# Patient Record
Sex: FEMALE | Race: BLACK OR AFRICAN AMERICAN | NOT HISPANIC OR LATINO | Employment: OTHER | URBAN - METROPOLITAN AREA
[De-identification: names, ages, dates, MRNs, and addresses within clinical notes are randomized per-mention and may not be internally consistent; named-entity substitution may affect disease eponyms.]

---

## 2017-01-13 ENCOUNTER — OFFICE VISIT (OUTPATIENT)
Dept: INTERNAL MEDICINE | Facility: CLINIC | Age: 72
End: 2017-01-13
Payer: COMMERCIAL

## 2017-01-13 VITALS
BODY MASS INDEX: 30.66 KG/M2 | HEART RATE: 69 BPM | SYSTOLIC BLOOD PRESSURE: 132 MMHG | TEMPERATURE: 98 F | OXYGEN SATURATION: 96 % | WEIGHT: 184 LBS | HEIGHT: 65 IN | DIASTOLIC BLOOD PRESSURE: 70 MMHG | RESPIRATION RATE: 16 BRPM

## 2017-01-13 DIAGNOSIS — M85.80 OSTEOPENIA: ICD-10-CM

## 2017-01-13 DIAGNOSIS — I10 ESSENTIAL HYPERTENSION: ICD-10-CM

## 2017-01-13 DIAGNOSIS — Z00.00 ANNUAL PHYSICAL EXAM: ICD-10-CM

## 2017-01-13 DIAGNOSIS — E21.0 HYPERPARATHYROIDISM, PRIMARY: Primary | ICD-10-CM

## 2017-01-13 PROCEDURE — 99999 PR PBB SHADOW E&M-EST. PATIENT-LVL III: CPT | Mod: PBBFAC,,, | Performed by: NURSE PRACTITIONER

## 2017-01-13 PROCEDURE — 99397 PER PM REEVAL EST PAT 65+ YR: CPT | Mod: S$GLB,,, | Performed by: NURSE PRACTITIONER

## 2017-01-13 PROCEDURE — 3078F DIAST BP <80 MM HG: CPT | Mod: S$GLB,,, | Performed by: NURSE PRACTITIONER

## 2017-01-13 PROCEDURE — 3075F SYST BP GE 130 - 139MM HG: CPT | Mod: S$GLB,,, | Performed by: NURSE PRACTITIONER

## 2017-01-13 RX ORDER — RESVER/WINE/BFL/GRPSD/PC/C/POM 200MG-60MG
5000 CAPSULE ORAL DAILY
COMMUNITY
End: 2021-03-03

## 2017-01-13 NOTE — PATIENT INSTRUCTIONS
Follow up in July, sooner if needed  Go to ER for new worse or concerning symptoms  Take Citracal     Understanding Primary Hyperparathyroidism  The parathyroid glands are 4 tiny glands in the neck. They make parathyroid hormone (PTH). PTH controls the amount of calcium and phosphorus in your blood. Primary hyperparathyroidism is when there is too much PTH in your blood. It occurs when one or more of the glands are too active.     Normal parathyroid glands         Enlarged parathyroid gland      What causes primary hyperparathyroidism?  Primary hyperparathyroidism can occur when a parathyroid gland becomes enlarged. Hyperparathyroidism can also occur as a complication of other medical conditions, such as kidney failure, or rickets, but in these conditions, calcium is usually not high. This is called secondary hyperthyroidism.  Why is it a problem?  With primary hyperparathyroidism, your glands make too much PTH. The job of PTH is to tell the body how to control calcium. Too much PTH means the body increases the amount of calcium in the blood. This leads to a problem called hypercalcemia. This is when the amount of calcium in the blood becomes too high. Hypercalcemia can cause serious health problems.  High calcium can be caused by other conditions, like taking too many calcium and vitamin D supplements, certain genetic conditions, or other circumstances. Your doctor will find out if primary hyperparathyroidism is the cause of your high calcium levels. If so, treatment options will be discussed with you.  What are the risk factors for primary hyperparathyroidism?  The risk factors for developing this condition include:  · Being a woman (its less common in men)  · Being older (its more likely to occur with age)  · Having parents or siblings with the condition or other endocrine tumors  · Having certain kidney problems  · Taking certain medications  · Having had radiation treatment in the head or neck  What are the  symptoms of primary hyperparathyroidism?  Symptoms of the condition can include:  · Muscle weakness  · Depression  · Tiredness  · Confusion and memory loss  · Poor memory  · Nausea and vomiting  · Pain in the stomach area (abdomen)  · Hard stools (constipation)  · Stomach ulcers  · Need to urinate often  · Kidney stones  · Joint or bone pain  · Bone disease (osteopenia or osteoporosis), an increase in bone fractures  · High blood pressure  · Increased thirst  How is primary hyperparathyroidism treated?  If primary hyperparathyroidism is not treated, it can get worse over time. Surgery may be done to remove any enlarged parathyroid glands. This lets the amount of calcium in the blood go back to normal. Your doctors may want to ensure that you have enough vitamin D and calcium nutrition before the surgery. This will reduce the risk of low calcium after the surgery. You and your doctor can discuss your treatment options. Be sure to ask any questions you have.  © 5756-2688 [employer]. [address] [place] [address]. All rights reserved. This information is not intended as a substitute for professional medical care. Always follow your healthcare professional's instructions.        Understanding Primary Hyperparathyroidism  The parathyroid glands are 4 tiny glands in the neck. They make parathyroid hormone (PTH). PTH controls the amount of calcium and phosphorus in your blood. Primary hyperparathyroidism is when there is too much PTH in your blood. It occurs when one or more of the glands are too active.     Normal parathyroid glands         Enlarged parathyroid gland      What causes primary hyperparathyroidism?  Primary hyperparathyroidism can occur when a parathyroid gland becomes enlarged. Hyperparathyroidism can also occur as a complication of other medical conditions, such as kidney failure, or rickets, but in these conditions, calcium is usually not high. This is called secondary  hyperthyroidism.  Why is it a problem?  With primary hyperparathyroidism, your glands make too much PTH. The job of PTH is to tell the body how to control calcium. Too much PTH means the body increases the amount of calcium in the blood. This leads to a problem called hypercalcemia. This is when the amount of calcium in the blood becomes too high. Hypercalcemia can cause serious health problems.  High calcium can be caused by other conditions, like taking too many calcium and vitamin D supplements, certain genetic conditions, or other circumstances. Your doctor will find out if primary hyperparathyroidism is the cause of your high calcium levels. If so, treatment options will be discussed with you.  What are the risk factors for primary hyperparathyroidism?  The risk factors for developing this condition include:  · Being a woman (its less common in men)  · Being older (its more likely to occur with age)  · Having parents or siblings with the condition or other endocrine tumors  · Having certain kidney problems  · Taking certain medications  · Having had radiation treatment in the head or neck  What are the symptoms of primary hyperparathyroidism?  Symptoms of the condition can include:  · Muscle weakness  · Depression  · Tiredness  · Confusion and memory loss  · Poor memory  · Nausea and vomiting  · Pain in the stomach area (abdomen)  · Hard stools (constipation)  · Stomach ulcers  · Need to urinate often  · Kidney stones  · Joint or bone pain  · Bone disease (osteopenia or osteoporosis), an increase in bone fractures  · High blood pressure  · Increased thirst  How is primary hyperparathyroidism treated?  If primary hyperparathyroidism is not treated, it can get worse over time. Surgery may be done to remove any enlarged parathyroid glands. This lets the amount of calcium in the blood go back to normal. Your doctors may want to ensure that you have enough vitamin D and calcium nutrition before the surgery. This  will reduce the risk of low calcium after the surgery. You and your doctor can discuss your treatment options. Be sure to ask any questions you have.  © 3695-9379 [employer]. [address] [place] [address], [place] [address]. All rights reserved. This information is not intended as a substitute for professional medical care. Always follow your healthcare professional's instructions.

## 2017-01-13 NOTE — MR AVS SNAPSHOT
[address] [address]  [place] [address]  Phone: [phone]                  Marsha Chun   2017 2:00 PM   Office Visit    Description:  Female : 1945   Provider:  Anali Quigley, NP-C   Department:  [place] [facility]           Reason for Visit     Annual Exam                To Do List           Goals (5 Years of Data)     None      [facility]     [facility] On Call Nurse Care Line -  Assistance  Registered nurses in the [facility] Center provide clinical advisement, health education, appointment booking, and other advisory services.  Call for this free service at [phone].             Medications           Message regarding Medications     Verify the changes and/or additions to your medication regime listed below are the same as discussed with your clinician today.  If any of these changes or additions are incorrect, please notify your healthcare provider.        STOP taking these medications     atenolol (TENORMIN) 50 MG tablet Take 1 tablet (50 mg total) by mouth once daily.    chlorthalidone (HYGROTEN) 25 MG Tab Take 1 tablet (25 mg total) by mouth once daily.           Verify that the below list of medications is an accurate representation of the medications you are currently taking.  If none reported, the list may be blank. If incorrect, please contact your healthcare provider. Carry this list with you in case of emergency.           Current Medications     atenolol-chlorthalidone (TENORETIC) 50-25 mg Tab TAKE ONE TABLET BY MOUTH ONCE DAILY    cholecalciferol, vitamin D3, (VITAMIN D3) 5,000 unit Tab Take 5,000 Units by mouth once daily.    fish oil-omega-3 fatty acids 300-1,000 mg capsule Take 2 g by mouth once daily.           Clinical Reference Information           Vital Signs - Last Recorded  Most recent update: 2017  2:03 PM by Rosio Maldonado MA    BP Pulse Temp Resp Ht Wt    132/70 (BP Location: Left arm,  "Patient Position: Sitting, BP Method: Manual) 69 97.5 °F (36.4 °C) (Oral) 16 5' 5" (1.651 m) 83.5 kg (184 lb)    SpO2 BMI             96% 30.62 kg/m2         Blood Pressure          Most Recent Value    BP  132/70      Allergies as of 1/13/2017     No Known Allergies      Immunizations Administered on Date of Encounter - 1/13/2017     None      [facility] Sign-Up     Activating your [facility] account is as easy as 1-2-3!     1) Visit [email or web address], select Sign Up Now, enter this activation code and your date of birth, then select Next.  [email or web address]  Expires: 2/27/2017  2:22 PM      2) Create a username and password to use when you visit [facility] in the future and select a security question in case you lose your password and select Next.    3) Enter your e-mail address and click Sign Up!    Additional Information  If you have questions, please e-mail [email or web address] or call [phone] to talk to our [facility] staff. Remember, [facility] is NOT to be used for urgent needs. For medical emergencies, dial 911.         Instructions    Follow up in July, sooner if needed  Go to ER for new worse or concerning symptoms  Take Citracal     Understanding Primary Hyperparathyroidism  The parathyroid glands are 4 tiny glands in the neck. They make parathyroid hormone (PTH). PTH controls the amount of calcium and phosphorus in your blood. Primary hyperparathyroidism is when there is too much PTH in your blood. It occurs when one or more of the glands are too active.     Normal parathyroid glands         Enlarged parathyroid gland      What causes primary hyperparathyroidism?  Primary hyperparathyroidism can occur when a parathyroid gland becomes enlarged. Hyperparathyroidism can also occur as a complication of other medical conditions, such as kidney failure, or rickets, but in these conditions, calcium is usually not high. This is called secondary hyperthyroidism.  Why is it a problem?  With primary " hyperparathyroidism, your glands make too much PTH. The job of PTH is to tell the body how to control calcium. Too much PTH means the body increases the amount of calcium in the blood. This leads to a problem called hypercalcemia. This is when the amount of calcium in the blood becomes too high. Hypercalcemia can cause serious health problems.  High calcium can be caused by other conditions, like taking too many calcium and vitamin D supplements, certain genetic conditions, or other circumstances. Your doctor will find out if primary hyperparathyroidism is the cause of your high calcium levels. If so, treatment options will be discussed with you.  What are the risk factors for primary hyperparathyroidism?  The risk factors for developing this condition include:  · Being a woman (its less common in men)  · Being older (its more likely to occur with age)  · Having parents or siblings with the condition or other endocrine tumors  · Having certain kidney problems  · Taking certain medications  · Having had radiation treatment in the head or neck  What are the symptoms of primary hyperparathyroidism?  Symptoms of the condition can include:  · Muscle weakness  · Depression  · Tiredness  · Confusion and memory loss  · Poor memory  · Nausea and vomiting  · Pain in the stomach area (abdomen)  · Hard stools (constipation)  · Stomach ulcers  · Need to urinate often  · Kidney stones  · Joint or bone pain  · Bone disease (osteopenia or osteoporosis), an increase in bone fractures  · High blood pressure  · Increased thirst  How is primary hyperparathyroidism treated?  If primary hyperparathyroidism is not treated, it can get worse over time. Surgery may be done to remove any enlarged parathyroid glands. This lets the amount of calcium in the blood go back to normal. Your doctors may want to ensure that you have enough vitamin D and calcium nutrition before the surgery. This will reduce the risk of low calcium after the surgery.  You and your doctor can discuss your treatment options. Be sure to ask any questions you have.  © 5882-9595 [employer]. [address] [place] [address], [place] [address]. All rights reserved. This information is not intended as a substitute for professional medical care. Always follow your healthcare professional's instructions.        Understanding Primary Hyperparathyroidism  The parathyroid glands are 4 tiny glands in the neck. They make parathyroid hormone (PTH). PTH controls the amount of calcium and phosphorus in your blood. Primary hyperparathyroidism is when there is too much PTH in your blood. It occurs when one or more of the glands are too active.     Normal parathyroid glands         Enlarged parathyroid gland      What causes primary hyperparathyroidism?  Primary hyperparathyroidism can occur when a parathyroid gland becomes enlarged. Hyperparathyroidism can also occur as a complication of other medical conditions, such as kidney failure, or rickets, but in these conditions, calcium is usually not high. This is called secondary hyperthyroidism.  Why is it a problem?  With primary hyperparathyroidism, your glands make too much PTH. The job of PTH is to tell the body how to control calcium. Too much PTH means the body increases the amount of calcium in the blood. This leads to a problem called hypercalcemia. This is when the amount of calcium in the blood becomes too high. Hypercalcemia can cause serious health problems.  High calcium can be caused by other conditions, like taking too many calcium and vitamin D supplements, certain genetic conditions, or other circumstances. Your doctor will find out if primary hyperparathyroidism is the cause of your high calcium levels. If so, treatment options will be discussed with you.  What are the risk factors for primary hyperparathyroidism?  The risk factors for developing this condition include:  · Being a woman (its less common in men)  · Being older  (its more likely to occur with age)  · Having parents or siblings with the condition or other endocrine tumors  · Having certain kidney problems  · Taking certain medications  · Having had radiation treatment in the head or neck  What are the symptoms of primary hyperparathyroidism?  Symptoms of the condition can include:  · Muscle weakness  · Depression  · Tiredness  · Confusion and memory loss  · Poor memory  · Nausea and vomiting  · Pain in the stomach area (abdomen)  · Hard stools (constipation)  · Stomach ulcers  · Need to urinate often  · Kidney stones  · Joint or bone pain  · Bone disease (osteopenia or osteoporosis), an increase in bone fractures  · High blood pressure  · Increased thirst  How is primary hyperparathyroidism treated?  If primary hyperparathyroidism is not treated, it can get worse over time. Surgery may be done to remove any enlarged parathyroid glands. This lets the amount of calcium in the blood go back to normal. Your doctors may want to ensure that you have enough vitamin D and calcium nutrition before the surgery. This will reduce the risk of low calcium after the surgery. You and your doctor can discuss your treatment options. Be sure to ask any questions you have.  © 7652-2046 [employer]. [address] [place] [address], [place] [address]. All rights reserved. This information is not intended as a substitute for professional medical care. Always follow your healthcare professional's instructions.

## 2017-01-13 NOTE — PROGRESS NOTES
Subjective:       Patient ID: Marsha Chun is a 72 y.o. female.    Chief Complaint: Annual Exam    HPI Ms Chun is here for her annual exam.   She feels well today.  She eats well and is active.  Her health maintenance is up to date.    Review of Systems   Constitutional: Negative for fever.   Respiratory: Negative.    Cardiovascular: Negative.        Objective:      Physical Exam   Constitutional: She is oriented to person, place, and time. She appears well-developed and well-nourished. She does not appear ill. No distress.   Cardiovascular: Normal rate, regular rhythm and normal heart sounds.  Exam reveals no friction rub.    No murmur heard.  Pulmonary/Chest: Effort normal and breath sounds normal. No respiratory distress. She has no decreased breath sounds. She has no wheezes. She has no rhonchi. She has no rales.   Musculoskeletal: Normal range of motion. She exhibits no edema.   Neurological: She is alert and oriented to person, place, and time.   Skin: Skin is warm and dry. No erythema.   Psychiatric: She has a normal mood and affect. Her behavior is normal.   Vitals reviewed.      Assessment:       1. Hyperparathyroidism, primary    2. Annual physical exam    3. Essential hypertension    4. Osteopenia        Plan:       Hyperparathyroidism, primary  Calcium WNL, due in July, bone density WNL, repeat in 2 years    Annual physical exam  F/u in July with labs    Essential hypertension  Controlled, continue current treatment    Osteopenia  Continue Citracal    F/u in July, sooner if necessary  Verbalized understanding

## 2017-03-09 DIAGNOSIS — I10 ESSENTIAL HYPERTENSION: ICD-10-CM

## 2017-03-10 RX ORDER — ATENOLOL AND CHLORTHALIDONE TABLET 50; 25 MG/1; MG/1
TABLET ORAL
Qty: 90 TABLET | Refills: 0 | Status: SHIPPED | OUTPATIENT
Start: 2017-03-10 | End: 2017-05-24 | Stop reason: SDUPTHER

## 2017-04-17 PROBLEM — Z00.00 ANNUAL PHYSICAL EXAM: Status: RESOLVED | Noted: 2017-01-13 | Resolved: 2017-04-17

## 2017-05-24 DIAGNOSIS — I10 ESSENTIAL HYPERTENSION: ICD-10-CM

## 2017-05-24 RX ORDER — ATENOLOL AND CHLORTHALIDONE TABLET 50; 25 MG/1; MG/1
1 TABLET ORAL DAILY
Qty: 90 TABLET | Refills: 1 | Status: SHIPPED | OUTPATIENT
Start: 2017-05-24 | End: 2017-08-03 | Stop reason: SDUPTHER

## 2017-05-24 NOTE — TELEPHONE ENCOUNTER
----- Message from Rima Vela sent at 5/24/2017 11:32 AM CDT -----  Contact: Self   Patient says she need a refill. Please call patient at [phone]     atenolol-chlorthalidone (TENORETIC) 50-25 mg Tab    [facility]

## 2017-08-03 ENCOUNTER — HOSPITAL ENCOUNTER (OUTPATIENT)
Dept: RADIOLOGY | Facility: HOSPITAL | Age: 72
Discharge: HOME | End: 2017-08-03
Attending: INTERNAL MEDICINE
Payer: COMMERCIAL

## 2017-08-03 ENCOUNTER — TELEPHONE (OUTPATIENT)
Dept: FAMILY MEDICINE | Facility: CLINIC | Age: 72
End: 2017-08-03

## 2017-08-03 ENCOUNTER — OFFICE VISIT (OUTPATIENT)
Dept: FAMILY MEDICINE | Facility: CLINIC | Age: 72
End: 2017-08-03
Payer: COMMERCIAL

## 2017-08-03 VITALS
SYSTOLIC BLOOD PRESSURE: 146 MMHG | BODY MASS INDEX: 30.42 KG/M2 | OXYGEN SATURATION: 96 % | DIASTOLIC BLOOD PRESSURE: 82 MMHG | RESPIRATION RATE: 17 BRPM | TEMPERATURE: 98 F | HEIGHT: 65 IN | WEIGHT: 182.56 LBS | HEART RATE: 78 BPM

## 2017-08-03 VITALS — BODY MASS INDEX: 30.32 KG/M2 | WEIGHT: 182 LBS | HEIGHT: 65 IN

## 2017-08-03 DIAGNOSIS — M25.551 RIGHT HIP PAIN: Primary | ICD-10-CM

## 2017-08-03 DIAGNOSIS — Z12.31 OTHER SCREENING MAMMOGRAM: Primary | ICD-10-CM

## 2017-08-03 DIAGNOSIS — Z12.31 OTHER SCREENING MAMMOGRAM: ICD-10-CM

## 2017-08-03 DIAGNOSIS — I10 ESSENTIAL HYPERTENSION: ICD-10-CM

## 2017-08-03 PROCEDURE — 99214 OFFICE O/P EST MOD 30 MIN: CPT | Mod: 25,S$GLB,, | Performed by: NURSE PRACTITIONER

## 2017-08-03 PROCEDURE — 77067 SCR MAMMO BI INCL CAD: CPT | Mod: 26,,, | Performed by: RADIOLOGY

## 2017-08-03 PROCEDURE — 3008F BODY MASS INDEX DOCD: CPT | Mod: S$GLB,,, | Performed by: NURSE PRACTITIONER

## 2017-08-03 PROCEDURE — 77067 SCR MAMMO BI INCL CAD: CPT | Mod: TC

## 2017-08-03 PROCEDURE — 96372 THER/PROPH/DIAG INJ SC/IM: CPT | Mod: S$GLB,,, | Performed by: NURSE PRACTITIONER

## 2017-08-03 PROCEDURE — 99999 PR PBB SHADOW E&M-EST. PATIENT-LVL III: CPT | Mod: PBBFAC,,, | Performed by: NURSE PRACTITIONER

## 2017-08-03 PROCEDURE — 1159F MED LIST DOCD IN RCRD: CPT | Mod: S$GLB,,, | Performed by: NURSE PRACTITIONER

## 2017-08-03 PROCEDURE — 1125F AMNT PAIN NOTED PAIN PRSNT: CPT | Mod: S$GLB,,, | Performed by: NURSE PRACTITIONER

## 2017-08-03 RX ORDER — ATENOLOL AND CHLORTHALIDONE TABLET 50; 25 MG/1; MG/1
1 TABLET ORAL DAILY
Qty: 90 TABLET | Refills: 1 | Status: SHIPPED | OUTPATIENT
Start: 2017-08-03 | End: 2017-08-03 | Stop reason: SDUPTHER

## 2017-08-03 RX ORDER — KETOROLAC TROMETHAMINE 30 MG/ML
30 INJECTION, SOLUTION INTRAMUSCULAR; INTRAVENOUS
Status: COMPLETED | OUTPATIENT
Start: 2017-08-03 | End: 2017-08-03

## 2017-08-03 RX ORDER — DEXAMETHASONE SODIUM PHOSPHATE 4 MG/ML
8 INJECTION, SOLUTION INTRA-ARTICULAR; INTRALESIONAL; INTRAMUSCULAR; INTRAVENOUS; SOFT TISSUE
Status: COMPLETED | OUTPATIENT
Start: 2017-08-03 | End: 2017-08-03

## 2017-08-03 RX ORDER — ATENOLOL AND CHLORTHALIDONE TABLET 50; 25 MG/1; MG/1
1 TABLET ORAL DAILY
Qty: 90 TABLET | Refills: 1 | Status: SHIPPED | OUTPATIENT
Start: 2017-08-03 | End: 2017-10-05 | Stop reason: SDUPTHER

## 2017-08-03 RX ORDER — METHOCARBAMOL 750 MG/1
750 TABLET, FILM COATED ORAL 4 TIMES DAILY PRN
Qty: 45 TABLET | Refills: 0 | Status: SHIPPED | OUTPATIENT
Start: 2017-08-03 | End: 2017-09-02

## 2017-08-03 RX ADMIN — KETOROLAC TROMETHAMINE 30 MG: 30 INJECTION, SOLUTION INTRAMUSCULAR; INTRAVENOUS at 12:08

## 2017-08-03 RX ADMIN — DEXAMETHASONE SODIUM PHOSPHATE 8 MG: 4 INJECTION, SOLUTION INTRA-ARTICULAR; INTRALESIONAL; INTRAMUSCULAR; INTRAVENOUS; SOFT TISSUE at 12:08

## 2017-08-03 NOTE — PATIENT INSTRUCTIONS
Follow up if not improved  Go to ER for new worse or concerning symptoms    Muscle Strain in the Extremities  A muscle strain is a stretching and tearing of muscle fibers. This causes pain, especially when you move that muscle. There may also be some swelling and bruising.  Home care  · Keep the hurt area raised to reduce pain and swelling. This is especially important during the first 48 hours.  · Apply an ice pack over the injured area for 15 to 20 minutes every 3 to 6 hours. You should do this for the first 24 to 48 hours. You can make an ice pack by filling a plastic bag that seals at the top with ice cubes and then wrapping it with a thin towel. Be careful not to injure your skin with the ice treatments. Ice should never be applied directly to skin. Continue the use of ice packs for relief of pain and swelling as needed. After 48 hours, apply heat (warm shower or warm bath) for 15 to 20 minutes several times a day, or alternate ice and heat.  · You may use over-the-counter pain medicine to control pain, unless another medicine was prescribed. If you have chronic liver or kidney disease or ever had a stomach ulcer or GI bleeding, talk with your healthcare provider before using these medicines.  · For leg strains: If crutches have been recommended, dont put full weight on the hurt leg until you can do so without pain. You can return to sports when you are able to hop and run on the injured leg without pain.  Follow-up care  Follow up with your healthcare provider, or as advised.  When to seek medical advice  Call your healthcare provider right away if any of these occur:  · The toes of the injured leg become swollen, cold, blue, numb, or tingly  · Pain or swelling increases  Date Last Reviewed: 11/19/2015  © 7322-4289 [employer]. [address] [place] [address], [place] [address]. All rights reserved. This information is not intended as a substitute for professional medical care. Always follow your  healthcare professional's instructions.

## 2017-08-03 NOTE — PROGRESS NOTES
Dexamethasone 8mg and ketorolac 30mg.....Given to the pt as order by the MD. The patient tolerated well, I advised the patient to wait 15 minuets to observe for any vaccine reactions. The pt. Expressed an understanding.

## 2017-08-03 NOTE — TELEPHONE ENCOUNTER
----- Message from Imelda Leon sent at 8/2/2017 12:07 PM CDT -----  Contact: self  Pt calling to get order for a mammogram. Please call [phone].

## 2017-08-03 NOTE — PROGRESS NOTES
Subjective:       Patient ID: Marsha Chun is a 72 y.o. female.    Chief Complaint: Hip Pain (right )    HPI Ms Chun is here for R hip/buttock pain for the last 3 weeks, denies any inciting incident, it feels like a soreness, she's taken aleve with some relief.  No bowel or bladder dysfunction, no saddle paresthesias, it does not radiate down her leg.  Review of Systems   Constitutional: Negative for fever.   Respiratory: Negative.    Cardiovascular: Negative.    Musculoskeletal: Positive for myalgias.       Objective:      Physical Exam   Constitutional: She is oriented to person, place, and time. She appears well-developed and well-nourished. She does not appear ill. No distress.   Cardiovascular: Normal rate.    No murmur heard.  Pulses:       Dorsalis pedis pulses are 2+ on the right side.        Posterior tibial pulses are 2+ on the right side.   Pulmonary/Chest: Effort normal.   Musculoskeletal: Normal range of motion. She exhibits no edema.        Right hip: She exhibits normal range of motion, normal strength and no tenderness.        Lumbar back: She exhibits normal range of motion, no tenderness and no bony tenderness.   Neurological: She is alert and oriented to person, place, and time.   Skin: Skin is warm and dry. No erythema.   Psychiatric: She has a normal mood and affect. Her behavior is normal.   Vitals reviewed.      Assessment:       1. Right hip pain    2. Essential hypertension        Plan:       Right hip pain  -     ketorolac injection 30 mg; Inject 30 mg into the muscle one time.  -     dexamethasone injection 8 mg; Inject 2 mLs (8 mg total) into the muscle one time.  -     methocarbamol (ROBAXIN) 750 MG Tab; Take 1 tablet (750 mg total) by mouth 4 (four) times daily as needed.  Dispense: 45 tablet; Refill: 0    Essential hypertension  -     atenolol-chlorthalidone (TENORETIC) 50-25 mg Tab; Take 1 tablet by mouth once daily.  Dispense: 90 tablet; Refill: 1  -      atenolol-chlorthalidone (TENORETIC) 50-25 mg Tab; Take 1 tablet by mouth once daily.  Dispense: 90 tablet; Refill: 1    Most likely msk, will give injection, add robaxin to aleve, f/u if not improved    Verbalized understanding

## 2017-10-05 DIAGNOSIS — I10 ESSENTIAL HYPERTENSION: ICD-10-CM

## 2017-10-06 RX ORDER — ATENOLOL AND CHLORTHALIDONE TABLET 50; 25 MG/1; MG/1
1 TABLET ORAL DAILY
Qty: 90 TABLET | Refills: 1 | Status: SHIPPED | OUTPATIENT
Start: 2017-10-06 | End: 2018-04-19 | Stop reason: SDUPTHER

## 2017-11-16 ENCOUNTER — OFFICE VISIT (OUTPATIENT)
Dept: FAMILY MEDICINE | Facility: CLINIC | Age: 72
End: 2017-11-16
Payer: COMMERCIAL

## 2017-11-16 VITALS
RESPIRATION RATE: 17 BRPM | BODY MASS INDEX: 30.37 KG/M2 | OXYGEN SATURATION: 97 % | WEIGHT: 182.31 LBS | TEMPERATURE: 98 F | HEART RATE: 64 BPM | DIASTOLIC BLOOD PRESSURE: 80 MMHG | HEIGHT: 65 IN | SYSTOLIC BLOOD PRESSURE: 130 MMHG

## 2017-11-16 DIAGNOSIS — Z02.4 ENCOUNTER FOR CDL (COMMERCIAL DRIVING LICENSE) EXAM: Primary | ICD-10-CM

## 2017-11-16 LAB
BILIRUB SERPL-MCNC: NEGATIVE MG/DL
BLOOD URINE, POC: NEGATIVE
COLOR, POC UA: YELLOW
GLUCOSE UR QL STRIP: NORMAL
KETONES UR QL STRIP: NEGATIVE
LEUKOCYTE ESTERASE URINE, POC: 1
NITRITE, POC UA: NEGATIVE
PH, POC UA: 7
PROTEIN, POC: NEGATIVE
SPECIFIC GRAVITY, POC UA: 1
UROBILINOGEN, POC UA: NORMAL

## 2017-11-16 PROCEDURE — 81001 URINALYSIS AUTO W/SCOPE: CPT | Mod: S$GLB,,, | Performed by: NURSE PRACTITIONER

## 2017-11-16 PROCEDURE — 99999 PR PBB SHADOW E&M-EST. PATIENT-LVL IV: CPT | Mod: PBBFAC,,, | Performed by: NURSE PRACTITIONER

## 2017-11-16 PROCEDURE — 99214 OFFICE O/P EST MOD 30 MIN: CPT | Mod: 25,S$GLB,, | Performed by: NURSE PRACTITIONER

## 2017-11-16 NOTE — PROGRESS NOTES
Subjective:       Patient ID: Marsha Chun is a 72 y.o. female.    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 She feels well today.  She denies any h/o seizures, DM or LOC.  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  Review of Systems   Constitutional: Negative for fever.   Respiratory: Negative.    Cardiovascular: Negative.        Objective:      Physical Exam   Constitutional: She is oriented to person, place, and time. She appears well-developed and well-nourished. She does not appear ill. No distress.   HENT:   Head: Normocephalic and atraumatic.   Right Ear: Tympanic membrane normal.   Left Ear: Tympanic membrane normal.   Mouth/Throat: Uvula is midline, oropharynx is clear and moist and mucous membranes are normal.   Forced whisper WNL at 5 feet.   Eyes: Conjunctivae and EOM are normal. Pupils are equal, round, and reactive to light.   Visual acuity under hearing/vision tab  Horizontal field of vision 70 degrees     Cardiovascular: Normal rate, regular rhythm and normal heart sounds.  Exam reveals no friction rub.    No murmur heard.  Pulses:       Radial pulses are 2+ on the right side, and 2+ on the left side.        Dorsalis pedis pulses are 2+ on the right side, and 2+ on the left side.        Posterior tibial pulses are 2+ on the right side, and 2+ on the left side.   Pulmonary/Chest: Effort normal and breath sounds normal. No respiratory distress. She has no decreased breath sounds. She has no wheezes. She has no rhonchi. She has no rales.   Abdominal: Soft. Bowel sounds are normal. There is no hepatosplenomegaly. There is no tenderness. No hernia.   Musculoskeletal: Normal range of motion. She exhibits no edema.   B hand  equal and strong  BLE 5/5  BUE 5/5   Neurological: She is alert and oriented to person, place, and time.   Skin: Skin is warm and dry. No erythema.   Vitals reviewed.      Assessment:       1. Encounter for CDL  (commercial driving license) exam        Plan:       Encounter for CDL (commercial driving license) exam  -     POCT urinalysis, dipstick or tablet reag    She is recertified for 2 years, no restrictions.   She was also advised to f/u with eye MD since 20/40 is the upper limit of normal.    F/u in January for annual and basic labs.

## 2018-01-02 ENCOUNTER — TELEPHONE (OUTPATIENT)
Dept: FAMILY MEDICINE | Facility: CLINIC | Age: 73
End: 2018-01-02

## 2018-01-02 DIAGNOSIS — E55.9 HYPOVITAMINOSIS D: ICD-10-CM

## 2018-01-02 DIAGNOSIS — E20.9 HYPOPARATHYROIDISM, UNSPECIFIED HYPOPARATHYROIDISM TYPE: ICD-10-CM

## 2018-01-02 DIAGNOSIS — Z00.00 ANNUAL PHYSICAL EXAM: Primary | ICD-10-CM

## 2018-01-04 ENCOUNTER — LAB VISIT (OUTPATIENT)
Dept: LAB | Facility: HOSPITAL | Age: 73
End: 2018-01-04
Attending: NURSE PRACTITIONER
Payer: COMMERCIAL

## 2018-01-04 DIAGNOSIS — E20.9 HYPOPARATHYROIDISM, UNSPECIFIED HYPOPARATHYROIDISM TYPE: ICD-10-CM

## 2018-01-04 DIAGNOSIS — E55.9 HYPOVITAMINOSIS D: ICD-10-CM

## 2018-01-04 DIAGNOSIS — Z00.00 ANNUAL PHYSICAL EXAM: ICD-10-CM

## 2018-01-04 LAB
25(OH)D3+25(OH)D2 SERPL-MCNC: 27 NG/ML
ANION GAP SERPL CALC-SCNC: 7 MMOL/L
ANISOCYTOSIS BLD QL SMEAR: SLIGHT
BASOPHILS # BLD AUTO: 0.07 K/UL
BASOPHILS NFR BLD: 0.9 %
BUN SERPL-MCNC: 11 MG/DL
CALCIUM SERPL-MCNC: 10.4 MG/DL
CHLORIDE SERPL-SCNC: 101 MMOL/L
CHOLEST SERPL-MCNC: 227 MG/DL
CHOLEST/HDLC SERPL: 2.9 {RATIO}
CO2 SERPL-SCNC: 31 MMOL/L
CREAT SERPL-MCNC: 0.9 MG/DL
DIFFERENTIAL METHOD: ABNORMAL
EOSINOPHIL # BLD AUTO: 0.1 K/UL
EOSINOPHIL NFR BLD: 1.9 %
ERYTHROCYTE [DISTWIDTH] IN BLOOD BY AUTOMATED COUNT: 14.5 %
EST. GFR  (AFRICAN AMERICAN): >60 ML/MIN/1.73 M^2
EST. GFR  (NON AFRICAN AMERICAN): >60 ML/MIN/1.73 M^2
GLUCOSE SERPL-MCNC: 91 MG/DL
HCT VFR BLD AUTO: 39.8 %
HDLC SERPL-MCNC: 79 MG/DL
HDLC SERPL: 34.8 %
HGB BLD-MCNC: 13.9 G/DL
LDLC SERPL CALC-MCNC: 128 MG/DL
LYMPHOCYTES # BLD AUTO: 2.2 K/UL
LYMPHOCYTES NFR BLD: 29.5 %
MCH RBC QN AUTO: 25.7 PG
MCHC RBC AUTO-ENTMCNC: 34.9 G/DL
MCV RBC AUTO: 74 FL
MONOCYTES # BLD AUTO: 0.5 K/UL
MONOCYTES NFR BLD: 6.8 %
NEUTROPHILS # BLD AUTO: 4.5 K/UL
NEUTROPHILS NFR BLD: 60.9 %
NONHDLC SERPL-MCNC: 148 MG/DL
PLATELET # BLD AUTO: 288 K/UL
PMV BLD AUTO: 11 FL
POLYCHROMASIA BLD QL SMEAR: ABNORMAL
POTASSIUM SERPL-SCNC: 4.6 MMOL/L
RBC # BLD AUTO: 5.41 M/UL
SODIUM SERPL-SCNC: 139 MMOL/L
TRIGL SERPL-MCNC: 100 MG/DL
WBC # BLD AUTO: 7.48 K/UL

## 2018-01-04 PROCEDURE — 85025 COMPLETE CBC W/AUTO DIFF WBC: CPT

## 2018-01-04 PROCEDURE — 83970 ASSAY OF PARATHORMONE: CPT

## 2018-01-04 PROCEDURE — 80048 BASIC METABOLIC PNL TOTAL CA: CPT

## 2018-01-04 PROCEDURE — 82306 VITAMIN D 25 HYDROXY: CPT

## 2018-01-04 PROCEDURE — 80061 LIPID PANEL: CPT

## 2018-01-04 PROCEDURE — 36415 COLL VENOUS BLD VENIPUNCTURE: CPT | Mod: PN

## 2018-01-05 LAB — PTH-INTACT SERPL-MCNC: 81.3 PG/ML

## 2018-01-15 ENCOUNTER — OFFICE VISIT (OUTPATIENT)
Dept: FAMILY MEDICINE | Facility: CLINIC | Age: 73
End: 2018-01-15
Payer: COMMERCIAL

## 2018-01-15 VITALS
OXYGEN SATURATION: 98 % | DIASTOLIC BLOOD PRESSURE: 98 MMHG | WEIGHT: 182.75 LBS | HEART RATE: 60 BPM | BODY MASS INDEX: 30.45 KG/M2 | TEMPERATURE: 99 F | SYSTOLIC BLOOD PRESSURE: 144 MMHG | RESPIRATION RATE: 17 BRPM | HEIGHT: 65 IN

## 2018-01-15 DIAGNOSIS — R35.0 FREQUENCY OF URINATION: ICD-10-CM

## 2018-01-15 DIAGNOSIS — N32.81 OAB (OVERACTIVE BLADDER): ICD-10-CM

## 2018-01-15 DIAGNOSIS — M25.50 ARTHRALGIA, UNSPECIFIED JOINT: ICD-10-CM

## 2018-01-15 DIAGNOSIS — Z00.00 ANNUAL PHYSICAL EXAM: Primary | ICD-10-CM

## 2018-01-15 LAB
BILIRUB SERPL-MCNC: NORMAL MG/DL
BLOOD URINE, POC: NORMAL
COLOR, POC UA: YELLOW
GLUCOSE UR QL STRIP: NORMAL
KETONES UR QL STRIP: NORMAL
LEUKOCYTE ESTERASE URINE, POC: NORMAL
NITRITE, POC UA: NORMAL
PH, POC UA: 8
PROTEIN, POC: NORMAL
SPECIFIC GRAVITY, POC UA: 1005
UROBILINOGEN, POC UA: NORMAL

## 2018-01-15 PROCEDURE — 99999 PR PBB SHADOW E&M-EST. PATIENT-LVL III: CPT | Mod: PBBFAC,,, | Performed by: NURSE PRACTITIONER

## 2018-01-15 PROCEDURE — 81001 URINALYSIS AUTO W/SCOPE: CPT | Mod: S$GLB,,, | Performed by: NURSE PRACTITIONER

## 2018-01-15 PROCEDURE — 99397 PER PM REEVAL EST PAT 65+ YR: CPT | Mod: 25,S$GLB,, | Performed by: NURSE PRACTITIONER

## 2018-01-15 RX ORDER — OXYBUTYNIN CHLORIDE 5 MG/1
5 TABLET ORAL 2 TIMES DAILY
Qty: 180 TABLET | Refills: 0 | Status: SHIPPED | OUTPATIENT
Start: 2018-01-15 | End: 2018-03-28 | Stop reason: SDUPTHER

## 2018-01-15 RX ORDER — LIDOCAINE AND PRILOCAINE 25; 25 MG/G; MG/G
CREAM TOPICAL
Qty: 30 G | Refills: 0 | Status: SHIPPED | OUTPATIENT
Start: 2018-01-15 | End: 2018-10-16 | Stop reason: ALTCHOICE

## 2018-01-15 RX ORDER — LIDOCAINE AND PRILOCAINE 25; 25 MG/G; MG/G
CREAM TOPICAL
Status: CANCELLED | OUTPATIENT
Start: 2018-01-15

## 2018-01-15 RX ORDER — LIDOCAINE AND PRILOCAINE 25; 25 MG/G; MG/G
CREAM TOPICAL ONCE
COMMUNITY
End: 2018-01-15

## 2018-01-15 NOTE — PATIENT INSTRUCTIONS
Follow up with primary care provider if not improved  Go to ER for new, worse or concerning symptoms    Arthralgia    Arthralgia is the term for pain in or around the joint. It is a symptom, not a disease. This pain may involve one or more joints. In some cases, the pain moves from joint to joint.  There are many causes for joint pain. These include:  · Injury  · Osteoarthritis (wearing out of the joint surface)  · Gout (inflammation of the joint due to crystals in the joint fluid)  · Infection inside the joint    · Bursitis (inflammation of the fluid-filled sacs around the joint)  · Autoimmune disorders such as rheumatoid arthritis or lupus  · Tendonitis (inflammation of chords that attach muscle to bone)  Home care  · Rest the involved joint(s) until your symptoms improve.   · You may be prescribed pain medicine. If none is prescribed, you may use acetaminophen or ibuprofen to control pain and inflammation.  Follow-up care  Follow up with your healthcare provider or as advised.  When to seek medical advice  Contact your healthcare provider right away if any of the following occurs:  · Pain, swelling, or redness of joint increases  · Pain worsens or recurs after a period of improvement  · Pain moves to other joints  · You cannot bear weight on the affected joint   · You cannot move the affected joint  · Joint appears deformed  · New rash appears  · Fever of 100.4ºF (38ºC) or higher, or as directed by your healthcare provider  Date Last Reviewed: 3/1/2017  © 2085-9984 [employer]. [address] [place] [address], [place] [address]. All rights reserved. This information is not intended as a substitute for professional medical care. Always follow your healthcare professional's instructions.

## 2018-01-15 NOTE — PROGRESS NOTES
Subjective:       Patient ID: Marsha Chun is a 73 y.o. female.    Chief Complaint: Annual Exam    HPI Ms Chun is here for her annual exam.  She feels well today.  She eats well, stays active.  She still works.  She recently had labs drawn.  She reports medication compliance. She reports some urinary frequency, no pain.  She has been doing kegel exercises. She would like a refill on EMLA cream that she rubs on her shoulder when it hurts  Review of Systems   Constitutional: Negative for fever.   Respiratory: Negative.    Cardiovascular: Negative.        Objective:      Physical Exam   Constitutional: She is oriented to person, place, and time. She appears well-developed and well-nourished. She does not appear ill. No distress.   HENT:   Head: Normocephalic and atraumatic.   Cardiovascular: Normal rate, regular rhythm and normal heart sounds.  Exam reveals no friction rub.    No murmur heard.  Pulmonary/Chest: Effort normal and breath sounds normal. No respiratory distress. She has no decreased breath sounds. She has no wheezes. She has no rhonchi. She has no rales.   Musculoskeletal: Normal range of motion.   Neurological: She is alert and oriented to person, place, and time.   Skin: Skin is warm and dry.   Psychiatric: She has a normal mood and affect. Her behavior is normal.   Vitals reviewed.      Assessment:       1. Annual physical exam    2. Frequency of urination    3. Arthralgia, unspecified joint    4. OAB (overactive bladder)        Plan:       Annual physical exam    Frequency of urination  -     POCT urinalysis, dipstick or tablet reag    Arthralgia, unspecified joint  -     lidocaine-prilocaine (EMLA) cream; Apply topically as needed.  Dispense: 30 g; Refill: 0    OAB (overactive bladder)  -     oxybutynin (DITROPAN) 5 MG Tab; Take 1 tablet (5 mg total) by mouth 2 (two) times daily.  Dispense: 180 tablet; Refill: 0    We reviewed most recent labs  udip WNL, most likely OAB, trial  oxybutnin  Follow up in 1 year, sooner if needed

## 2018-03-23 ENCOUNTER — TELEPHONE (OUTPATIENT)
Dept: ORTHOPEDICS | Facility: CLINIC | Age: 73
End: 2018-03-23

## 2018-03-23 ENCOUNTER — HOSPITAL ENCOUNTER (EMERGENCY)
Facility: HOSPITAL | Age: 73
Discharge: HOME | End: 2018-03-23
Attending: EMERGENCY MEDICINE
Payer: COMMERCIAL

## 2018-03-23 VITALS
WEIGHT: 170 LBS | RESPIRATION RATE: 17 BRPM | DIASTOLIC BLOOD PRESSURE: 82 MMHG | HEIGHT: 65 IN | TEMPERATURE: 99 F | OXYGEN SATURATION: 99 % | SYSTOLIC BLOOD PRESSURE: 165 MMHG | BODY MASS INDEX: 28.32 KG/M2 | HEART RATE: 63 BPM

## 2018-03-23 DIAGNOSIS — S93.402A SPRAIN OF LEFT ANKLE, UNSPECIFIED LIGAMENT, INITIAL ENCOUNTER: ICD-10-CM

## 2018-03-23 DIAGNOSIS — M25.532 LEFT WRIST PAIN: ICD-10-CM
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
## 2018-03-23 DIAGNOSIS — S62.316B OPEN DISPLACED FRACTURE OF BASE OF FIFTH METACARPAL BONE OF RIGHT HAND, INITIAL ENCOUNTER: Primary | ICD-10-CM

## 2018-03-23 PROCEDURE — 29515 APPLICATION SHORT LEG SPLINT: CPT | Mod: LT

## 2018-03-23 PROCEDURE — 90471 IMMUNIZATION ADMIN: CPT | Performed by: PHYSICIAN ASSISTANT

## 2018-03-23 PROCEDURE — 25000003 PHARM REV CODE 250: Performed by: PHYSICIAN ASSISTANT

## 2018-03-23 PROCEDURE — 63600175 PHARM REV CODE 636 W HCPCS: Performed by: PHYSICIAN ASSISTANT

## 2018-03-23 PROCEDURE — 25000003 PHARM REV CODE 250: Performed by: STUDENT IN AN ORGANIZED HEALTH CARE EDUCATION/TRAINING PROGRAM

## 2018-03-23 PROCEDURE — 90715 TDAP VACCINE 7 YRS/> IM: CPT | Performed by: PHYSICIAN ASSISTANT

## 2018-03-23 PROCEDURE — 99284 EMERGENCY DEPT VISIT MOD MDM: CPT | Mod: 25

## 2018-03-23 PROCEDURE — 29125 APPL SHORT ARM SPLINT STATIC: CPT

## 2018-03-23 RX ORDER — IBUPROFEN 600 MG/1
600 TABLET ORAL
Status: COMPLETED | OUTPATIENT
Start: 2018-03-23 | End: 2018-03-23

## 2018-03-23 RX ORDER — OXYCODONE AND ACETAMINOPHEN 5; 325 MG/1; MG/1
1 TABLET ORAL EVERY 4 HOURS PRN
Qty: 20 TABLET | Refills: 0 | Status: SHIPPED | OUTPATIENT
Start: 2018-03-23 | End: 2018-04-11 | Stop reason: SDUPTHER

## 2018-03-23 RX ORDER — OXYCODONE HYDROCHLORIDE 5 MG/1
5 TABLET ORAL
Status: COMPLETED | OUTPATIENT
Start: 2018-03-23 | End: 2018-03-23

## 2018-03-23 RX ORDER — LIDOCAINE HYDROCHLORIDE 10 MG/ML
20 INJECTION INFILTRATION; PERINEURAL ONCE
Status: COMPLETED | OUTPATIENT
Start: 2018-03-23 | End: 2018-03-23

## 2018-03-23 RX ADMIN — IBUPROFEN 600 MG: 600 TABLET, FILM COATED ORAL at 01:03

## 2018-03-23 RX ADMIN — CLOSTRIDIUM TETANI TOXOID ANTIGEN (FORMALDEHYDE INACTIVATED), CORYNEBACTERIUM DIPHTHERIAE TOXOID ANTIGEN (FORMALDEHYDE INACTIVATED), BORDETELLA PERTUSSIS TOXOID ANTIGEN (GLUTARALDEHYDE INACTIVATED), BORDETELLA PERTUSSIS FILAMENTOUS HEMAGGLUTININ ANTIGEN (FORMALDEHYDE INACTIVATED), BORDETELLA PERTUSSIS PERTACTIN ANTIGEN, AND BORDETELLA PERTUSSIS FIMBRIAE 2/3 ANTIGEN 0.5 ML: 5; 2; 2.5; 5; 3; 5 INJECTION, SUSPENSION INTRAMUSCULAR at 01:03

## 2018-03-23 RX ADMIN — LIDOCAINE HYDROCHLORIDE 20 ML: 10 INJECTION, SOLUTION INFILTRATION; PERINEURAL at 03:03

## 2018-03-23 RX ADMIN — OXYCODONE HYDROCHLORIDE 5 MG: 5 TABLET ORAL at 03:03

## 2018-03-23 NOTE — CONSULTS
Consult Note  Orthopedic Surgery      SUBJECTIVE:     History of Present Illness:  Marsha Chun is a [employer] employee 73 y.o. female with PMH of TIA, HTN, hyperparathyroidism presenting with bilateral hand injuries and left ankle pain. Patient was walking out from work and fell onto bilateral hands, right knee and left ankle. Immediate pain to the areas she fell on. Denies head injury or LOC. Denies other MSK pains. Denies numbness, tingling, or paresthesias to extremities.      Past Medical History:   Diagnosis Date    History of TB skin testing     Hyperparathyroidism, unspecified     Hypertension     Osteopenia     Overweight(278.02)        Past Surgical History:   Procedure Laterality Date    lasik and a rotator cuff surgery      SHOULDER SURGERY         Family History   Problem Relation Age of Onset    Stroke Sister     Diabetes Neg Hx     Thyroid cancer Neg Hx     Osteoporosis Neg Hx        Social History     Social History    Marital status:      Spouse name: N/A    Number of children: N/A    Years of education: N/A     Social History Main Topics    Smoking status: Never Smoker    Smokeless tobacco: Never Used    Alcohol use No    Drug use: No    Sexual activity: Not on file     Other Topics Concern    Not on file     Social History Narrative    No narrative on file       No current facility-administered medications for this encounter.      Current Outpatient Prescriptions   Medication Sig Dispense Refill    atenolol-chlorthalidone (TENORETIC) 50-25 mg Tab Take 1 tablet by mouth once daily. 90 tablet 1    cholecalciferol, vitamin D3, (VITAMIN D3) 5,000 unit Tab Take 5,000 Units by mouth once daily.      fish oil-omega-3 fatty acids 300-1,000 mg capsule Take 2 g by mouth once daily.      lidocaine-prilocaine (EMLA) cream Apply topically as needed. 30 g 0    oxybutynin (DITROPAN) 5 MG Tab Take 1 tablet (5 mg total) by mouth 2 (two) times daily. 180 tablet 0     "oxyCODONE-acetaminophen (PERCOCET) 5-325 mg per tablet Take 1 tablet by mouth every 4 (four) hours as needed for Pain. 20 tablet 0       Review of patient's allergies indicates:  No Known Allergies      Review of Systems:  ROS: Patient denies constitutional symptoms, cardiac symptoms, respiratory symptoms, GI symptoms. The remainder of the musculoskeletal ROS is included in the HPI.      OBJECTIVE:     Vital Signs (Most Recent)  Vitals:    03/23/18 0043   BP: (!) 184/90   Pulse: 61   Resp: 18   Temp: 98.7 °F (37.1 °C)   TempSrc: Oral   SpO2: 98%   Weight: 77.1 kg (170 lb)   Height: 5' 5" (1.651 m)         Physical Exam:  Vitals: Afebrile.  Vital signs stable.  General: No acute distress.  HEENT: Normocephalic. Atraumatic. Sclera anicteric. No tracheal deviation.  Cardio: Regular rate and rhythm.  Chest: No increased work of breathing.  Abdominal: Nondistended.  Extremities: No cyanosis.  No clubbing.  No edema.  Palpable pulses.  Skin: No generalized rash.  Neuro: Awake. Alert. Oriented to person, place, time, and situation.  Psych: Normal appearance. Cooperative.  Appropriate mood.  Appropriate affect.      RUE:  Skin intact throughout  No tenderness to palpation of proximal, middle, or distal aspects of humerus  No tenderness to palpation of proximal or middle aspects of radius or ulna  Tenderness to palpation of base of fifth metacarpal  Compartments soft  Painless ROM shoulder and elbow  SILT M/U/R  Motor intact AIN/PIN/M/U/R  2+ radial  Brisk capillary refill     LUE:  Skin intact throughout  No tenderness to palpation of proximal, middle, or distal aspects of humerus  No tenderness to palpation of proximal or middle aspects of radius or ulna  Tenderness to palpation of radial proximal hand and wrist  Compartments soft  Painless ROM shoulder and elbow  SILT M/U/R  Motor intact AIN/PIN/M/U/R  2+ radial  Brisk capillary refill     LLE:  Skin intact throughout  Moderate swelling around ankle  No tenderness to " palpation of proximal, middle, or distal aspects of femur  No tenderness to palpation of proximal or middle aspects tibia or fibula  No tenderness to palpation of foot  Tenderness to palpation around lateral malleolus in soft tissue  Compartments soft  Painless ROM of hip and knee  SILT Sa/Davila/DP/SP/T  Motor intact EHL/FHL/TA/Gastroc  2+ DP     RLE:  Superficial skin abrasion to right knee 1 x 1 cm  Painless ROM of hips, knees, ankles  No tenderness to palpation of proximal, middle, or distal aspects of femur or tibia  No tenderness to palpation of foot         Diagnostic Results:  X-Ray: Reviewed   Left hand, left wrist: scapholunate widening, no other fracture or dislocation noted  Right hand: right base of 5th metacarpal fracture  Left ankle: no acute fracture, dislocation, or osseous lesion  Right knee: no acute fracture, dislocation, or osseous lesion    ASSESSMENT/PLAN:     73 y.o. female with right base of fifth metacarpal fracture, left scapholunate widening, and left ankle sprain  - Placed in right ulnar gutter splint, left thumb spica splint, and left short walking boot  - WBAT LLE  - NWB BUE  - Surgical treatment will likely be needed for right fifth metacarpal fracture    Cruz Sampson MD PGY-2  Orthopedic Surgery    Addendum:  Radiographs reviewed with orthopedic team. Left scapholunate widening with degenerative changes noted, suggesting chronic SL tear. Called patient and informed okay to remove splint from left hand.

## 2018-03-23 NOTE — ED NOTES
"Chief Complaint   Patient presents with    Fall     Fall around 2100, left ankle pain and swelling- no deformity noted, as well as right hand pain- no obvious injury, and small abrasion to right knee. No other injuries, denies hitting head or LOC.      Patient states she "fell at work when coming down a step." States she missed her footing, and fell down onto her hands and knees. +Dime size abrasion noted to right knee. Slight swelling present to left ankle. Patient C/O pain to bilateral hands and BLE.    Active Ambulatory Problems     Diagnosis Date Noted    Hypertension 11/16/2012    History of transient ischemic attack (TIA) 11/16/2012    Hyperparathyroidism, primary 11/16/2012    Osteopenia     Hyperparathyroidism, unspecified      Resolved Ambulatory Problems     Diagnosis Date Noted    Annual physical exam 01/13/2017     Past Medical History:   Diagnosis Date    History of TB skin testing     Hyperparathyroidism, unspecified     Hypertension     Osteopenia     Overweight(278.02)      Review of patient's allergies indicates:  No Known Allergies    Adult physical assessment:    Neuro: Patient AAOx4.   Musculoskeletal: Patient able to move extremities spontaneously. Slight swelling noted to left ankle. Dime size abrasion noted to right knee, without bleeding or purulent drainage visible. C/O pain to bilateral palm of hands, but no abnormalities noted.     "

## 2018-03-23 NOTE — ED PROVIDER NOTES
Encounter Date: 3/23/2018    SCRIBE #1 NOTE: I, Lia Garcia, am scribing for, and in the presence of,  Dr. Barclay. I have scribed the following portions of the note - the APC attestation.       History     Chief Complaint   Patient presents with    Fall     Fall around 2100, left ankle pain and swelling- no deformity noted, as well as right hand pain- no obvious injury, and small abrasion to right knee. No other injuries, denies hitting head or LOC.      73-year-old female to the ER after mechanical trip and fall.  The patient injured her right knee, left ankle, both hands.  She extended her hands to break her fall.  She denies any head injury or loss of consciousness.          Review of patient's allergies indicates:  No Known Allergies  Past Medical History:   Diagnosis Date    History of TB skin testing     Hyperparathyroidism, unspecified     Hypertension     Osteopenia     Overweight(278.02)      Past Surgical History:   Procedure Laterality Date    lasik and a rotator cuff surgery      SHOULDER SURGERY       Family History   Problem Relation Age of Onset    Stroke Sister     Diabetes Neg Hx     Thyroid cancer Neg Hx     Osteoporosis Neg Hx      Social History   Substance Use Topics    Smoking status: Never Smoker    Smokeless tobacco: Never Used    Alcohol use No     Review of Systems   Constitutional: Negative for fever.   HENT: Negative for sore throat.    Respiratory: Negative for shortness of breath.    Cardiovascular: Negative for chest pain.   Gastrointestinal: Negative for nausea.   Genitourinary: Negative for dysuria.   Musculoskeletal: Positive for arthralgias. Negative for back pain.   Skin: Negative for rash.   Neurological: Negative for weakness.   Hematological: Does not bruise/bleed easily.       Physical Exam     Initial Vitals [03/23/18 0043]   BP Pulse Resp Temp SpO2   (!) 184/90 61 18 98.7 °F (37.1 °C) 98 %      MAP       121.33         Physical Exam    Constitutional:  Vital signs are normal. She appears well-developed and well-nourished. She is not diaphoretic. No distress.   HENT:   Head: Normocephalic and atraumatic.   Right Ear: External ear normal.   Eyes: Conjunctivae are normal.   Cardiovascular: Normal rate, regular rhythm and normal heart sounds. Exam reveals no friction rub.    No murmur heard.  Pulmonary/Chest: No respiratory distress. She has no wheezes. She has no rhonchi. She has no rales. She exhibits no tenderness.   Abdominal: Soft. Normal appearance and bowel sounds are normal. She exhibits no distension and no mass. There is no tenderness. There is no rebound and no guarding.   Musculoskeletal: Normal range of motion.   Tenderness to the proximal fifth metacarpal on the right  There is no angulation of the digits  There is minimal edema    Tenderness to the ulna and radius distal aspect on the left  No pain with supination and pronation on the left of the right  There is no edema on the left      Exam of the right knee reveals a minor abrasion with slight tenderness over the patella there is no joint laxity    Exam of the left ankle reveals lateral malleolus swelling and tenderness  Increased pain with range of motion of the ankle  No open injuries   Neurological: She is alert and oriented to person, place, and time.   Skin: Skin is warm and intact.   Psychiatric: She has a normal mood and affect. Her speech is normal and behavior is normal. Cognition and memory are normal.         ED Course   Procedures  Labs Reviewed - No data to display          Medical Decision Making:   History:   Old Medical Records: I decided to obtain old medical records.  Clinical Tests:   Radiological Study: Ordered and Reviewed  ED Management:  73-year-old female with multiple injuries status post fall.  She sustained a displaced comminuted fracture of the fifth metacarpal on the right hand  She also has findings suspicious for a ligamentous injury in the left hand/ wrist  Orthopedic  surgery has been contacted and evaluated and treated the patient in the ER .  She will be discharged home with pain medication , instructions on managing her injuries and follow-up to see orthopedic surgery hand clinic   She was placed in a walking boot for her Left ankle sprain.   Other:   I have discussed this case with another health care provider.       <> Summary of the Discussion: Orthopedics            Scribe Attestation:   Scribe #1: I performed the above scribed service and the documentation accurately describes the services I performed. I attest to the accuracy of the note.    Attending Attestation:     Physician Attestation Statement for NP/PA:   I discussed this assessment and plan of this patient with the NP/PA, but I did not personally examine the patient. The face to face encounter was performed by the NP/PA.                     Clinical Impression:   The primary encounter diagnosis was Open displaced fracture of base of fifth metacarpal bone of right hand, initial encounter. A diagnosis of Fall was also pertinent to this visit.    Disposition:   Disposition: Discharged  Condition: Stable                        Bernardo Travis PA-C  03/23/18 0418

## 2018-03-23 NOTE — DISCHARGE INSTRUCTIONS
Take percocet as needed for severe pain  Use ibuprofen as needed for mild pain  Use walking boot when bearing weight, use until directed so otherwise by orthopedic surgeon.   Apply ice to the affected areas multiple times daily  Do not get splint wet  Followup with orthopedic surgery as directed  Return to the ED for any new symptoms      Our goal in the emergency department is to always give you outstanding care and exceptional service. You may receive a survey by mail or e-mail in the next week regarding your experience in our ED. We would greatly appreciate your completing and returning the survey. Your feedback provides us with a way to recognize our staff who give very good care and it helps us learn how to improve when your experience was below our aspiration of excellence.

## 2018-03-23 NOTE — TELEPHONE ENCOUNTER
Spoke w/pt, appt scheduled w/Alecia for Monday. Pt verbalized understanding. Pt states she cannot write this down so left VM for pt with appt details date/location/time of appt.

## 2018-03-23 NOTE — TELEPHONE ENCOUNTER
----- Message from Cruz Sampson MD sent at 3/23/2018  4:15 AM CDT -----  Please schedule patient follow-up today or early next week for right base of 5th metacarpal fx (will likely need surgery) and left scapholunate ligament injury with hand clinic. Thank you.

## 2018-03-26 ENCOUNTER — DOCUMENTATION ONLY (OUTPATIENT)
Dept: ORTHOPEDICS | Facility: CLINIC | Age: 73
End: 2018-03-26

## 2018-03-26 ENCOUNTER — TELEPHONE (OUTPATIENT)
Dept: ORTHOPEDICS | Facility: CLINIC | Age: 73
End: 2018-03-26

## 2018-03-26 NOTE — TELEPHONE ENCOUNTER
I spoke with the patient and made her a new patient appointment. She was made aware of date, time and location. Mrs. Ramírez also called to inform us that the patient was approved by work comp.

## 2018-03-26 NOTE — PROGRESS NOTES
Pt came to office upon check in she states this is now work comp. Per Alecia ALLRED. appt cancelled d/t work comp because provider does not see work comp. Phone number to [facility]  Work comp and Dr Ojeda's office provided.

## 2018-03-28 ENCOUNTER — OFFICE VISIT (OUTPATIENT)
Dept: ORTHOPEDICS | Facility: CLINIC | Age: 73
End: 2018-03-28
Attending: ORTHOPAEDIC SURGERY
Payer: COMMERCIAL

## 2018-03-28 VITALS — WEIGHT: 170 LBS | BODY MASS INDEX: 28.32 KG/M2 | HEIGHT: 65 IN

## 2018-03-28 DIAGNOSIS — N32.81 OAB (OVERACTIVE BLADDER): ICD-10-CM

## 2018-03-28 DIAGNOSIS — S62.346D CLOSED NONDISPLACED FRACTURE OF BASE OF FIFTH METACARPAL BONE OF RIGHT HAND WITH ROUTINE HEALING: ICD-10-CM

## 2018-03-28 PROCEDURE — 99203 OFFICE O/P NEW LOW 30 MIN: CPT | Mod: S$GLB,,, | Performed by: ORTHOPAEDIC SURGERY

## 2018-03-28 PROCEDURE — 99999 PR PBB SHADOW E&M-EST. PATIENT-LVL III: CPT | Mod: PBBFAC,,, | Performed by: ORTHOPAEDIC SURGERY

## 2018-03-28 RX ORDER — HYDROCODONE BITARTRATE AND ACETAMINOPHEN 5; 325 MG/1; MG/1
1 TABLET ORAL EVERY 6 HOURS PRN
Qty: 30 TABLET | Refills: 0 | Status: SHIPPED | OUTPATIENT
Start: 2018-03-28 | End: 2018-04-07

## 2018-03-28 RX ORDER — OXYBUTYNIN CHLORIDE 5 MG/1
5 TABLET ORAL 2 TIMES DAILY
Qty: 180 TABLET | Refills: 3 | Status: SHIPPED | OUTPATIENT
Start: 2018-03-28 | End: 2018-04-12 | Stop reason: SDUPTHER

## 2018-03-28 NOTE — PROGRESS NOTES
INITIAL VISIT HISTORY:  A 73-year-old female sustained injury to both hands when   she fell at work last week.  She is complaining of pain in both hands.  The   right hand is worse than the left and it is localized mainly over the ulnar   side.    She did have x-rays previously of the right hand, which showed evidence of a   minimally displaced fracture at the base of the fifth metacarpal.    PAST MEDICAL HISTORY:  Significant for hyperparathyroidism, hypertension, and   osteopenia.    PAST SURGICAL HISTORY:  Includes LASIK surgery and shoulder surgery.    FAMILY HISTORY:  Positive for stroke.    SOCIAL HISTORY:  The patient does not smoke or drink.    REVIEW OF SYSTEMS:  Negative fever, chills, rashes.    CURRENT MEDICATIONS:  Reviewed on chart.    ALLERGIES:  None.    PHYSICAL EXAMINATION:  GENERAL:  Well-developed, well-nourished female in no acute distress, alert and   oriented x3.  EXTREMITIES:  Examination of the upper extremities significant for the right   hand, demonstrates mild diffuse tenderness.  She is really quite tender and   sensitive to any palpation about the hand, but most of the pain seems to be   centered near the fifth metacarpal and the ulnar side.  Slight bruising.  Range   of motion of fingers slightly decreased.  Clinical alignment looks good.  Skin   is intact.  The opposite left hand mildly tender over the base of the thumb.  No   significant bruising.  Sensation intact.    X-RAYS:  Reviewed, AP and lateral right hand demonstrate a nondisplaced or   minimally displaced fracture of the base of the fifth metacarpal, which is   extraarticular.  Alignment looks good.  Left hand demonstrates no fracture, but   some mild arthritic change at the base of the left thumb at the CMC joint.    IMPRESSION:  1.  Contusion, left hand.  2.  Right fifth metacarpal base fracture.    PLAN:  I explained the nature of problem to the patient.  Recommended an ulnar   gutter splint for the right hand.  She was  fitted for this today.  I would like   her to wear this more or less full-time for the next week or two.  She can have   it off for bathing and showering and watching TV.  No heavy lifting.  For the   left hand, light activity as well.  She should probably remain off work for a   week or two.  Follow up in two weeks for a recheck.      SALAS  dd: 03/28/2018 13:40:51 (CDT)  td: 03/29/2018 07:56:34 (CDT)  Doc ID   #[id number]  Job ID #[id number]    CC:

## 2018-04-05 ENCOUNTER — OFFICE VISIT (OUTPATIENT)
Dept: URGENT CARE | Facility: CLINIC | Age: 73
End: 2018-04-05
Payer: COMMERCIAL

## 2018-04-05 VITALS
HEIGHT: 65 IN | OXYGEN SATURATION: 96 % | BODY MASS INDEX: 28.99 KG/M2 | WEIGHT: 174 LBS | HEART RATE: 67 BPM | DIASTOLIC BLOOD PRESSURE: 90 MMHG | SYSTOLIC BLOOD PRESSURE: 139 MMHG

## 2018-04-05 DIAGNOSIS — S80.01XA CONTUSION OF RIGHT KNEE, INITIAL ENCOUNTER: ICD-10-CM

## 2018-04-05 DIAGNOSIS — S62.346A CLOSED NONDISPLACED FRACTURE OF BASE OF FIFTH METACARPAL BONE OF RIGHT HAND, INITIAL ENCOUNTER: ICD-10-CM

## 2018-04-05 DIAGNOSIS — S93.492A SPRAIN OF OTHER LIGAMENT OF LEFT ANKLE, INITIAL ENCOUNTER: Primary | ICD-10-CM

## 2018-04-05 DIAGNOSIS — M25.532 LEFT WRIST PAIN: ICD-10-CM

## 2018-04-05 PROCEDURE — 99203 OFFICE O/P NEW LOW 30 MIN: CPT | Mod: S$GLB,,, | Performed by: PHYSICIAN ASSISTANT

## 2018-04-05 NOTE — PROGRESS NOTES
Subjective:       Patient ID: Marsha Chun is a 73 y.o. female.    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 Pt c/o left ankle pain,stiffness, right knee pain, abrasion, and bilateral hand pain. Pt was seen at [facility] the day after the fall (see encounter) She was referred to Dr Ojeda for her right hand injury(enounter) ED gave pt a walker boot which she ambulates in with a shoe on inside boot. Pt currently reports a pain level of 6/10. She takes percocet for pain, warm soaks and pain cream for her ankle with moderate relief. ajd      Ankle Pain    The incident occurred more than 1 week ago. The incident occurred at work. The injury mechanism was a fall. The pain is present in the left ankle. The quality of the pain is described as aching. The pain is at a severity of 6/10. The pain has been constant since onset. Pertinent negatives include no numbness. She reports no foreign bodies present. The symptoms are aggravated by weight bearing. She has tried acetaminophen and heat for the symptoms. The treatment provided moderate relief.   Knee Injury   The current episode started 1 to 4 weeks ago. The problem has been gradually improving. Pertinent negatives include no abdominal pain, chest pain, chills, congestion, coughing, fever, headaches, joint swelling, nausea, neck pain, numbness, rash, vomiting or weakness.   Hand Pain    The incident occurred more than 1 week ago. The incident occurred at work. The injury mechanism was a fall. The pain is present in the left hand and right hand. The pain is at a severity of 6/10. The pain has been intermittent since the incident. Pertinent negatives include no chest pain or numbness.     Review of Systems   Constitution: Negative for chills, fever and weakness.   HENT:  Negative for congestion, ear pain and nosebleeds.    Eyes: Negative for blurred vision and pain.   Cardiovascular: Negative for chest pain and palpitations.   Respiratory: Negative for cough, shortness of breath and wheezing.    Skin: Negative for dry skin, itching and rash.   Musculoskeletal: Positive for joint pain. Negative for arthritis, back pain, gout, joint swelling, muscle weakness, neck pain and stiffness.   Gastrointestinal: Negative for abdominal pain, constipation, diarrhea, nausea and vomiting.   Genitourinary: Negative for dysuria, frequency and hematuria.   Neurological: Negative for dizziness, headaches, numbness and seizures.   Allergic/Immunologic: Negative for hives.   All other systems reviewed and are negative.      Objective:      Physical Exam   Constitutional: Vital signs are normal. She appears well-developed and well-nourished. She is active. No distress.   Ambulates slowly with left walking boot with limp.   Ulnar gutter splint on right hand.    HENT:   Head: Normocephalic and atraumatic.   Right Ear: Hearing and external ear normal.   Left Ear: Hearing and external ear normal.   Nose: Nose normal. No nasal deformity. No epistaxis.   Mouth/Throat: Oropharynx is clear and moist and mucous membranes are normal.   Eyes: Conjunctivae and lids are normal. No scleral icterus.   Neck: Trachea normal and normal range of motion.   Cardiovascular: Intact distal pulses and normal pulses.    Pulmonary/Chest: Effort normal. No stridor. No respiratory distress.   Musculoskeletal:        Left wrist: She exhibits decreased range of motion, tenderness (snuff box ttp), bony tenderness and swelling.        Right knee: She exhibits swelling and bony tenderness. She exhibits normal range of motion, no ecchymosis, no deformity, no LCL laxity and no MCL laxity. Tenderness found.        Left ankle: She exhibits decreased range of motion and swelling (mild). She exhibits no ecchymosis and normal pulse. Tenderness.  AITFL, CF ligament and posterior TFL tenderness found. No head of 5th metatarsal and no proximal fibula tenderness found. Achilles tendon normal.        Right hand: She exhibits decreased range of motion, tenderness (5th MC) and swelling. She exhibits normal capillary refill. Normal sensation noted.   Neurological: She is alert. She has normal strength. She is not disoriented. No sensory deficit. GCS eye subscore is 4. GCS verbal subscore is 5. GCS motor subscore is 6.   Skin: Skin is warm, dry and intact. Capillary refill takes less than 2 seconds. She is not diaphoretic.   Psychiatric: She has a normal mood and affect. Her speech is normal and behavior is normal. She is attentive.   Nursing note and vitals reviewed.      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  There is probably remote scaphoid trauma with a small ununited fragment.  There is DJD of the 1st carpometacarpal joint.     See above Electronically signed by: Adeel Villar MD Date:    04/05/2018 Time:    16:03    X-ray Wrist Complete Left    Result Date: 3/23/2018  EXAMINATION: XR WRIST COMPLETE 3 VIEWS LEFT CLINICAL HISTORY: Unspecified fall, initial encounter TECHNIQUE: PA, lateral, and oblique views of the left wrist were performed. COMPARISON: None FINDINGS: There is no definite acute displaced fracture of the left wrist.  There is abnormal widening of the scapholunate interval which may represent underlying ligamentous injury.  There is degenerative arthrosis of the radius scaphoid articulation as well as the carpometacarpal articulation of the thumb.  No radiopaque foreign bodies identified in the soft tissues.     1.  No evidence of acute displaced fracture. 2.  Abnormal widening of the scapholunate interval which may represent underlying  ligamentous injury. 3.  Degenerative osteoarthrosis as above. Electronically signed by: Jordy Coronel MD Date:    03/23/2018 Time:    02:10    X-ray Hand 3 View Left    Result Date: 3/23/2018  EXAMINATION: XR HAND COMPLETE 3 VIEW LEFT CLINICAL HISTORY: pain, scapholunate widening;. TECHNIQUE: PA, lateral, and oblique views of the left hand were performed. COMPARISON: Right hand radiographs, same day. FINDINGS: No displaced fracture or dislocation.  Scaphoid lunate interval is normal.  There are degenerative changes involving the carpocarpal joints and 1st carpometacarpal joint.  Soft tissues are symmetric.  No radiopaque foreign body.     No acute bony abnormality. Electronically signed by: Ken Sanders MD Date:    03/23/2018 Time:    03:00    X-ray Hand 3 View Right    Result Date: 3/23/2018  EXAMINATION: XR HAND COMPLETE 3 VIEW RIGHT CLINICAL HISTORY: fall; TECHNIQUE: PA, lateral, and oblique views of the right hand were performed. COMPARISON: None FINDINGS: There is a minimally comminuted minimally displaced fracture at the base of the right little finger metacarpal.  There is apparent intra-articular involvement of the carpometacarpal articulation without significant articular surface step-off.  The remaining osseous structures appear intact.  There is joint space narrowing involving the IP joints and to a lesser degree the MCP joints.  Findings are most pronounced involving the index finger DIP joint.  There is mild soft tissue edema noted about the ulnar aspect of the right hand.     1.  Minimally comminuted intra-articular fracture at the base of the right little finger metacarpal. Electronically signed by: Jordy Coronel MD Date:    03/23/2018 Time:    02:10    X-ray Knee 3 View Right    Result Date: 3/23/2018  EXAMINATION: XR KNEE 3 VIEW RIGHT CLINICAL HISTORY: Unspecified fall, initial encounter TECHNIQUE: AP, lateral, and Merchant views of the right knee were performed. COMPARISON: None FINDINGS: No  displaced fracture.  Normal alignment.  Mild to moderate tricompartmental degenerative changes about the ankle joint.  Soft tissues are symmetric.  No radiopaque foreign body.     Mild-to-moderate tricompartmental degenerative changes.  No acute bony abnormality. Electronically signed by: Ken Sanders MD Date:    03/23/2018 Time:    02:08    X-ray Ankle Complete Left    Result Date: 3/23/2018  EXAMINATION: XR ANKLE COMPLETE 3 VIEW LEFT CLINICAL HISTORY: Unspecified fall, initial encounter TECHNIQUE: AP, lateral and oblique views of the left ankle were performed. COMPARISON: None FINDINGS: No displaced fracture.  Normal alignment of the tibiotalar joint.  Mild-to-moderate soft tissue swelling about the ankle, more pronounced over the lateral malleolus.  No radiopaque foreign body.     Ankle soft tissue swelling.  No displaced fracture or dislocation. Electronically signed by: Ken Sanders MD Date:    03/23/2018 Time:    02:07  Assessment:       1. Sprain of other ligament of left ankle, initial encounter    2. Contusion of right knee, initial encounter    3. Closed nondisplaced fracture of base of fifth metacarpal bone of right hand, initial encounter    4. Left wrist pain        Plan:       Continue percocet as needed for pain. Home exercises daily and RICE for ankle and knee. Use walking boot as needed, advance to normal foot wear as tolerated.   Suspect left scaphoid fx. Thumb spica splint provided.     F/U with Dr. Ojeda as scheduled for management of hand fx. And to eval left wrist.      Patient Instructions: Use splint as directed   Restrictions: Disabled until next office visit  Follow-up in about 1 week (around 4/12/2018).

## 2018-04-05 NOTE — LETTER
[facility] [place]  [address], [address]  [place] [address]  Phone: [phone]  Fax: [phone]    Pt Name: Marsha Chun  Injury Date: 03/22/2018   Employee ID: [id number] Date of First Treatment: 04/05/2018   Company: [employer]            Appointment Time: 2:00 PM Arrived:  2:39 PM CDT   Physician: Vijay Doshi PA-C Time out: 4:42 PM       Office Treatment: Marsha was seen today for ankle pain, knee injury and hand pain.    Diagnoses and all orders for this visit:    Sprain of other ligament of left ankle, initial encounter  Contusion of right knee, initial encounter  Closed nondisplaced fracture of base of fifth metacarpal bone of right hand, initial encounter  Left wrist pain  -     X-Ray Wrist Complete Left     Patient Instructions: Use splint as directed      Restrictions: Disabled until next office visit       Return Appointment: 4/12/2018 at 2:00 PM

## 2018-04-05 NOTE — PATIENT INSTRUCTIONS
Closed Hand Fracture (Adult)  You have a fracture, or broken bone, in your hand. This may be a small crack or chip in the bone. Or it may be a major break with the broken parts pushed out of place. A closed fracture means that the broken bone has not gone through the skin. A hand fracture is treated with a splint or cast. It usually takes 4 to 6 weeks to heal. Severe injuries may require surgery.     Home care  · Keep your arm elevated to reduce pain and swelling. When sitting or lying down, elevate your arm above the level of your heart. You can do this by placing your arm on a pillow that rests on your chest or on a pillow at your side. This is most important during the first 48 hours after injury.  · Apply an ice pack over the injured area for no more than 15 to 20 minutes. Do this every 1 to 2 hours for the first 24 to 48 hours. Continue with ice packs as needed to ease pain and swelling. To make an ice pack, put ice cubes in a plastic bag that seals at the top. Wrap the bag in a clean, thin towel or cloth. Never put ice or an ice pack directly on the skin. You can place the ice pack inside the sling and directly over the cast or splint. As the ice melts, be careful that the cast or splint doesnt get wet.  · Keep the cast or splint completely dry at all times. Bathe with your cast or splint out of the water, protected with 2 large plastic bags. Place 1 bag outside the other. Tape each bag with duct tape at the top end. If a fiberglass cast or splint gets wet, dry it with a hair dryer on a cool setting.  · You may use over-the-counter pain medicine to control pain, unless another pain medicine was prescribed. If you have chronic liver or kidney disease or ever had a stomach ulcer or GI bleeding, talk with your provider beforeusing these medicines.  Follow-up care  Follow up with your healthcare provider within 1 week, or as advised. This is to be sure the bone is healing properly. If you were given a splint,  it may be changed to a cast at your follow-up visit.  If X-rays were taken, you will be told of any new findings that may affect your care.  When to seek medical advice  Call your healthcare provider right away if any of these occur:  · The plaster cast or splint becomes wet or soft  · The fiberglass cast or splint stays wet for more than 24 hours  · The cast has a bad smell  · The plaster cast or splint becomes loose  · There is increased tightness or pain under the cast or splint  · The fingers on your injured hand become swollen, cold, blue, numb, or tingly  Date Last Reviewed: 12/3/2015  © 5966-7862 [employer]. [address] [place] [address], [place] [address]. All rights reserved. This information is not intended as a substitute for professional medical care. Always follow your healthcare professional's instructions.        Lower Extremity Contusion  You have a contusion (bruise) of a lower extremity (leg, knee, ankle, foot, or toe). Symptoms include pain, swelling, and skin discoloration. No bones are broken. This injury may take from a few days to a few weeks to heal.  During that time, the bruise may change from reddish in color, to purple-blue, to green-yellow, to yellow-brown.  Home care  · Unless another medicine was prescribed, you can take acetaminophen, ibuprofen, or naproxen to control pain. (If you have chronic liver or kidney disease or ever had a stomach ulcer or gastrointestinal bleeding, talk with your doctor before using these medicines.)  · Elevate the injured area to reduce pain and swelling. As much as possible, sit or lie down with the injured area raised about the level of your heart. This is especially important during the first 48 hours.  · Ice the injured area to help reduce pain and swelling. Wrap a cold source (ice pack or ice cubes in a plastic bag) in a thin towel. Apply to the bruised area for 20 minutes every 1 to 2 hours the first day. Continue this 3 to 4 times a day until  the pain and swelling goes away.  · If crutches have been advised, do not bear full weight on the injured leg until you can do so without pain. You may return to sports when you are able to put full weight and impact on the injured leg without pain.  Follow up  Follow up with your healthcare provider or our staff as advised. Call if you are not improving within the next 1 to 2 weeks.  When to seek medical advice   Call your healthcare provider right away if any of these occur:  · Increased pain or swelling  · Foot or toes become cold, blue, numb or tingly  · Signs of infection: Warmth, drainage, or increased redness or pain around the injury  · Inability to move the injured area   · Frequent bruising for unknown reasons  Date Last Reviewed: 2/1/2017 © 2000-2017 [employer]. [address] [place] [address]. All rights reserved. This information is not intended as a substitute for professional medical care. Always follow your healthcare professional's instructions.        RICE     Rest an injury, elevate it, and use ice and compression as directed.   RICE stands for rest, ice, compression, and elevation. These can limit pain and swelling after an injury. RICE may be recommended to help treat fractures, sprains, strains, and bruises or bumps.   Home care  The following explain the details of RICE:  · Rest. Limit the use of the injured body part. This helps prevent further damage to the body part and gives it time to heal. In some cases, you may need a sling, brace, splint, or cast to help keep the body part still until it has healed.  · Ice. Applying ice right after an injury helps relieve pain and swelling. Wrap a bag of ice in a thin towel. Then, place it over the injured area. Do this for 10 to 15 minutes every 3 to 4 hours. Continue for the next 1 to 3 days or until your symptoms improve. Never put ice directly on your skin or ice an area longer than 15 minutes at a time.  · Compression.  Putting pressure on an injury helps reduce swelling and provides support. Wrap the injured area firmly with an elastic bandage/wrap. Make sure not to wrap the bandage too tightly or you will cut off blood flow to the injured area. If your bandage loosens, rewrap it.  · Elevation. Keeping an injury raised above the level of your heart reduces swelling, pain, and throbbing. For instance, if you have a broken leg, it may help to rest your leg on several pillows when sitting or lying down. Try to keep the injured area elevated for at least 2 to 3 hours per day.  Follow-up care  Follow up with your healthcare provider, or as advised.  When to seek medical advice  Call your healthcare provider right away if any of these occur:  · Fever of 100.4°F (38°C) or higher, or as directed by your healthcare provider  · Increased pain or swelling in the injured body part  · Injured body part becomes cold, blue, numb, or tingly  · Signs of infection. These include warmth in the skin, redness, drainage, or bad smell coming from the injured body part.  Date Last Reviewed: 1/18/2016  © 2271-6043 [employer]. [address] [place] [address]. All rights reserved. This information is not intended as a substitute for professional medical care. Always follow your healthcare professional's instructions.        Ankle Sprain (Adult)  An ankle sprain is a stretching or tearing of the ligaments that hold the ankle joint together. There are no broken bones.  An ankle sprain is a common injury for both children and adults. It happens when the ankle turns, twists, or rolls in an awkward way. This can be caused by a sports injury. Or it can happen from doing something as simple as stepping on an uneven surface.  Ligaments are made of tough connective tissue. Normally, ligaments stretch a certain amount and then go back to their normal place. A sprain happens when a ligament is forced to stretch more than the normal amount. A  severe sprain can actually tear the ligaments. If you have a severe sprain, you may have felt or heard something like a pop when you were injured.  Ankle sprains are given a grade depending on whether they are mild, moderate, or severe:  · Grade 1 sprain. A mild sprain with minor stretching and damage to the ligament.  · Grade 2 sprain. A moderate sprain where the ligament is partly torn.  · Grade 3 sprain. The most severe kind of sprain. The ligament is completely torn.  Most sprains take about 4 to 6 weeks to heal. A severe sprain can take several months to recover.  Your healthcare provider may order X-rays to be sure you dont have a fracture, or broken bone.  The injured area will feel sore.  Swelling and pain may make it hard to walk. You may need crutches if walking is painful. Or your provider may have you use a cast boot or air splint. This will depend on the grade of ankle sprain that you have.    Home care  · For a Grade 1 sprain, use RICE (rest, ice, compression, and elevation):  · Rest your ankle. Dont walk on it.  · Ice should be used right away to help control swelling. Place an ice pack over the injured area for 20 minutes. Do this every 3 to 6 hours for the first 24 to 48 hours. Keep using ice packs to ease pain and swelling as needed. To make an ice pack, put ice cubes in a plastic bag that seals at the top. Wrap the bag in a clean, thin towel or cloth. Never put ice or an ice pack directly on the skin. The ice pack can be put right on the cast, bandage, or splint. As the ice melts, be careful that the cast, bandage, or splint doesnt get wet. If you have a boot, open it to apply an ice pack, unless told otherwise by your provider.  · Compression devices help to control swelling. They also keep the ankle from moving and support your injured ankle. These devices include dressings, bandages, and wraps.  · Elevate or raise your ankle above the level of your heart when sitting or lying down. This is  very important for the first 48 hours.  · Follow the RICE guidelines for a Grade 2 sprain. This type of sprain will take longer to heal. Your provider may have you wear a splint, cast, or brace to keep your ankle from moving.  ·  If you have a Grade 3 sprain, you are at risk for long-term ankle instability. In rare cases, surgery may be needed. Your provider may have you wear a short leg cast or a walking boot for 2 to 3 weeks.  · After 48 hours, it may be helpful to apply heat for 20 minutes several times a day. You can do this with a heating pad or warm compress. Or you may want to go back and forth between using ice and heat. Never apply heat directly to the skin. Always wrap the heating pad or warm compress in a clean, thin towel or cloth.  · You may use over-the-counter pain medicine (NSAIDS or nonsteroidal anti-inflammatory drugs) to control pain, unless another pain medicine was prescribed. Talk with your provider before using these medicines if you have chronic liver or kidney disease, or have ever had a stomach ulcer or GI (gastrointestinal) bleeding.  · Follow any rehabilitation exercises your provider gives you. These can help you be more flexible and improve your balance and coordination. This is helpful in preventing long-term ankle problems.  Prevention  To help prevent ankle sprains, its important to have good strength, balance, and flexibility. Be sure to:  · Always warm up before you exercise or do something very active  · Be careful when walking or running on uneven or cracked surfaces  · Wear shoes that are in good condition and fit well  · Listen to your bodys signals to slow down when you are in pain or tired  Follow-up care  Any X-rays you had today dont show any broken bones, breaks, or fractures. Sometimes fractures dont show up on the first X-ray. Bruises and sprains can sometimes hurt as much as a fracture. These injuries can take time to heal completely. If your symptoms dont get  better or they get worse, talk with your healthcare provider. You may need a repeat X-ray.  Follow up with your healthcare provider, or as advised. Check for any warning signs listed below.  When to seek medical advice  Call your healthcare provider right away if any of these occur:  · Fever of 100.4 F (38 C) or higher, or as directed by your healthcare provider  · The injury doesnt seem to be healing  · The swelling comes back  · The cast has a bad smell  · The plaster cast or splint gets wet or soft  · The fiberglass cast or splint gets wet and does not dry for 24 hours  · The pain or swelling increases, or redness appears  · Your toes become cold, blue, numb, or tingly  · The skin is discolored (looks blue, purple, or gray), has blisters, or is irritated  · You re-injure your ankle  Date Last Reviewed: 11/20/2015  © 0053-3422 [employer]. [address] [place] [address]. All rights reserved. This information is not intended as a substitute for professional medical care. Always follow your healthcare professional's instructions.        Ankle Alphabet (Flexibility)    These instructions are for your right foot. Switch sides for your left foot.  1. Sit on the floor with your legs straight in front of you.  2. Rest your right calf on a rolled-up towel. Use your foot to write the letters of the alphabet in mid-air.  3. Repeat this exercise 3 times a day, or as instructed.  Date Last Reviewed: 3/10/2016  © 8395-9132 [employer]. [address] [place] [address]. All rights reserved. This information is not intended as a substitute for professional medical care. Always follow your healthcare professional's instructions.        Foot and Ankle Exercises: Ankle Circles    This exercise is designed to stretch and strengthen your feet and ankles. Before beginning the exercise, read through all the instructions. While exercising, breathe normally. If you feel any pain, stop the  exercise. If pain persists, inform your healthcare provider.  · Sit straight-legged on the floor or other firm surface.  · Resting your ______ calf on a rolled-up towel, use your foot to draw circles in both directions or write the letters of the alphabet in the air.  · Continue for ______ seconds. Do ______ times a day.  Date Last Reviewed: 9/9/2015  © 4375-0262 [employer]. [address] [place] [address]. All rights reserved. This information is not intended as a substitute for professional medical care. Always follow your healthcare professional's instructions.        Plantarflexion (Strength)    These instructions are for your right ankle. Switch sides for your left ankle.  4. Sit on a bed or the floor with your right leg out straight.  5. Loop an elastic exercise band or tubing around your right foot. Hold the ends in your hands.  6. Push on the elastic band with the ball of your foot, pointing your toes. Pull the elastic band toward as you do this. Hold for 5 seconds. Then move your foot back to the starting position.  7. Repeat 10 times, or as instructed.  8. Do this exercise 3 times a day, or as instructed.  Date Last Reviewed: 3/10/2016  © 3492-9865 [employer]. [address] [place] [address]. All rights reserved. This information is not intended as a substitute for professional medical care. Always follow your healthcare professional's instructions.

## 2018-04-11 ENCOUNTER — HOSPITAL ENCOUNTER (OUTPATIENT)
Dept: RADIOLOGY | Facility: OTHER | Age: 73
Discharge: HOME | End: 2018-04-11
Attending: ORTHOPAEDIC SURGERY
Payer: COMMERCIAL

## 2018-04-11 ENCOUNTER — OFFICE VISIT (OUTPATIENT)
Dept: ORTHOPEDICS | Facility: CLINIC | Age: 73
End: 2018-04-11
Attending: ORTHOPAEDIC SURGERY
Payer: COMMERCIAL

## 2018-04-11 VITALS — WEIGHT: 174 LBS | HEIGHT: 65 IN | BODY MASS INDEX: 28.99 KG/M2

## 2018-04-11 DIAGNOSIS — T14.8XXA FRACTURE: ICD-10-CM

## 2018-04-11 DIAGNOSIS — T14.8XXA FRACTURE: Primary | ICD-10-CM

## 2018-04-11 DIAGNOSIS — S62.346D CLOSED NONDISPLACED FRACTURE OF BASE OF FIFTH METACARPAL BONE OF RIGHT HAND WITH ROUTINE HEALING: Primary | ICD-10-CM

## 2018-04-11 PROCEDURE — 99999 PR PBB SHADOW E&M-EST. PATIENT-LVL III: CPT | Mod: PBBFAC,,, | Performed by: ORTHOPAEDIC SURGERY

## 2018-04-11 PROCEDURE — 73130 X-RAY EXAM OF HAND: CPT | Mod: 26,RT,, | Performed by: RADIOLOGY

## 2018-04-11 PROCEDURE — 73130 X-RAY EXAM OF HAND: CPT | Mod: TC,FY,RT

## 2018-04-11 PROCEDURE — 99213 OFFICE O/P EST LOW 20 MIN: CPT | Mod: S$GLB,,, | Performed by: ORTHOPAEDIC SURGERY

## 2018-04-11 RX ORDER — OXYCODONE AND ACETAMINOPHEN 5; 325 MG/1; MG/1
1 TABLET ORAL EVERY 12 HOURS PRN
Qty: 30 TABLET | Refills: 0 | Status: SHIPPED | OUTPATIENT
Start: 2018-04-11 | End: 2019-01-21

## 2018-04-11 NOTE — PROGRESS NOTES
HISTORY OF PRESENT ILLNESS:  Ms. Chun in followup of bilateral hand injury and   right fifth metacarpal base fracture.  She is still having quite a bit of pain   and limited use both hands.  She does have a thumb spica splint on the left hand   and an ulnar gutter splint on the right hand.    PHYSICAL EXAMINATION:  Both hands are diffusely tender.  Slight swelling on the   right hand.  Range of motion finger limited both hands, especially the right   ring and small finger.  She has some tenderness over the fifth metacarpal base.    X-RAYS:  AP and lateral right hand demonstrate healing fracture at the base of   the fifth metacarpal basically nondisplaced, alignment is good.    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  I went over that with her today as well.    PLAN:  I am a little bit concerned about the stiffness that is developing in   both hands and I think this is probably related to limited use and overreliance   on the braces.    At this point, I want her to start weaning out of both braces, especially the   left hand.  She can have that brace off most of the time, maybe just wear it for   lifting activities.    For the right hand, I would like her to wear the brace when she leaves the house   and for sleeping, but she can also have it off at home for light activities.    Also to address the stiffness, I have ordered some OT both hands to mobilize the   fingers for gentle range of motion bilateral hands, wrists and fingers.    In terms of use, she can use her hand for light activities.  No heavy lifting.    She should remain off work unless light duty is available.    At home, I would like her to start some warm water soaks, especially for the   right hand again to help mobilize fingers.  I did refill hydrocodone, but no   more than one or two per day maximum augment with Advil or  Motrin.  Follow up in   three weeks.      SALAS  dd: 04/11/2018 14:20:43 (CDT)  td: 04/12/2018 11:14:42 (CDT)  Doc ID   #[id number]  Job ID #[id number]    CC:

## 2018-04-12 ENCOUNTER — OFFICE VISIT (OUTPATIENT)
Dept: URGENT CARE | Facility: CLINIC | Age: 73
End: 2018-04-12
Payer: COMMERCIAL

## 2018-04-12 DIAGNOSIS — N32.81 OAB (OVERACTIVE BLADDER): ICD-10-CM

## 2018-04-12 DIAGNOSIS — S80.01XD CONTUSION OF RIGHT KNEE, SUBSEQUENT ENCOUNTER: ICD-10-CM

## 2018-04-12 DIAGNOSIS — S62.346D CLOSED NONDISPLACED FRACTURE OF BASE OF FIFTH METACARPAL BONE OF RIGHT HAND WITH ROUTINE HEALING, SUBSEQUENT ENCOUNTER: ICD-10-CM

## 2018-04-12 DIAGNOSIS — M25.532 LEFT WRIST PAIN: ICD-10-CM

## 2018-04-12 DIAGNOSIS — S93.402D SPRAIN OF LEFT ANKLE, UNSPECIFIED LIGAMENT, SUBSEQUENT ENCOUNTER: Primary | ICD-10-CM

## 2018-04-12 PROCEDURE — 99213 OFFICE O/P EST LOW 20 MIN: CPT | Mod: S$GLB,,, | Performed by: NURSE PRACTITIONER

## 2018-04-12 RX ORDER — OXYBUTYNIN CHLORIDE 5 MG/1
5 TABLET ORAL 2 TIMES DAILY
Qty: 360 TABLET | Refills: 0 | Status: SHIPPED | OUTPATIENT
Start: 2018-04-12 | End: 2021-03-03

## 2018-04-12 NOTE — PROGRESS NOTES
Subjective:       Patient ID: Marsha Chun is a 73 y.o. female.    Chief Complaint: Ankle Pain (left) and Hand Pain (bilateral)    F/u for left ankle, and bilateral hand pain (doi 3/22/2018) Pt c/o left ankle pain when standing and walking. She ambulates outside of her home with walker boot. Pt also c/o bilateral hand/wrist pain. She is treating with Dr Ojeda for hand injuries (see encounter) Pt is prescribed percocet for pain which provides moderate relief.       Ankle Pain    The incident occurred more than 1 week ago. The incident occurred at work. The injury mechanism was a fall. The pain is present in the left ankle. The pain is at a severity of 5/10. The pain is moderate. The pain has been intermittent since onset. Pertinent negatives include no numbness. She reports no foreign bodies present. The symptoms are aggravated by weight bearing. She has tried heat, acetaminophen and rest for the symptoms. The treatment provided moderate relief.   Hand Pain    The incident occurred more than 1 week ago. The incident occurred at work. The injury mechanism was a fall. The pain is present in the left hand, left wrist, right hand and right wrist. The quality of the pain is described as aching. The pain does not radiate. The pain is moderate. The pain has been intermittent since the incident. Pertinent negatives include no chest pain or numbness. The symptoms are aggravated by movement, lifting and palpation. She has tried acetaminophen, immobilization and heat for the symptoms. The treatment provided mild relief.     Review of Systems   Constitution: Negative for chills, fever and weakness.   HENT: Negative for congestion, ear pain and nosebleeds.    Eyes: Negative for blurred vision and pain.   Cardiovascular: Negative for chest pain and palpitations.   Respiratory: Negative for cough, shortness of breath and wheezing.    Skin: Negative for dry skin, itching and rash.   Musculoskeletal: Positive for joint pain.  Negative for arthritis, back pain, gout, joint swelling, muscle weakness, neck pain and stiffness.   Gastrointestinal: Negative for abdominal pain, constipation, diarrhea, nausea and vomiting.   Genitourinary: Negative for dysuria, frequency and hematuria.   Neurological: Negative for dizziness, headaches, numbness and seizures.   Allergic/Immunologic: Negative for hives.   All other systems reviewed and are negative.      Objective:      Physical Exam   Constitutional: She is oriented to person, place, and time. She appears well-developed and well-nourished.   HENT:   Right Ear: External ear normal.   Left Ear: External ear normal.   Nose: Nose normal.   Eyes: Conjunctivae are normal.   Cardiovascular: Intact distal pulses.    Pulmonary/Chest: Effort normal.   Musculoskeletal: She exhibits tenderness.        Left wrist: She exhibits decreased range of motion and tenderness.        Right knee: She exhibits erythema. She exhibits normal range of motion, no swelling, no ecchymosis and no LCL laxity. No tenderness found.        Left ankle: She exhibits decreased range of motion and swelling. She exhibits no ecchymosis and normal pulse. Tenderness.        Right hand: She exhibits decreased range of motion and tenderness.   Wearing splints to both hands/wrists. Limited ROM to both hands/wrists. Encouraged pt to do ROM exercises as prescribed by Dr Ojeda.  Wearing walker boot. Says she does not wear at home. Very limited ROM to ankle with c/o pain. Mild swelling to left lateral ankle. Neurovascular intact distally.  Abrasion to right knee is healing well. No pain to knee.   Neurological: She is alert and oriented to person, place, and time.   Skin: Skin is warm and dry. Capillary refill takes less than 2 seconds. No erythema.   Psychiatric: She has a normal mood and affect. Her behavior is normal.       Assessment:       1. Sprain of left ankle, unspecified ligament, subsequent encounter    2. Contusion of right knee,  subsequent encounter    3. Left wrist pain    4. Closed nondisplaced fracture of base of fifth metacarpal bone of right hand with routine healing, subsequent encounter        Plan:     Follow up with Dr Ojeda as scheduled.       Patient Instructions: Daily home exercises/warm soaks, Use splint as directed (Gradually discontinue ankle splint as discussed.) Sturdy tennis shoes at home.  Restrictions: Disabled until next office visit  Follow-up in about 1 week (around 4/19/2018).

## 2018-04-12 NOTE — LETTER
[facility] [place]  [address], [address]  [facility] [address]  Phone: [phone]  Fax: [phone]    Pt Name: Marsha Chun  Injury Date: 03/22/2018   Employee ID: [id number] Date: 04/12/2018   Company: [employer]            Appointment Time: 02:00 PM Arrived:  1:58 PM    Physician: Ana María Barber NP Time out: 3:26 PM       Office Treatment: Marsha was seen today for ankle pain and hand pain.    Diagnoses and all orders for this visit:    Sprain of left ankle, unspecified ligament, subsequent encounter  Contusion of right knee, subsequent encounter  Left wrist pain  Closed nondisplaced fracture of base of fifth metacarpal bone of right hand with routine healing, subsequent encounter     Patient Instructions: Daily home exercises/warm soaks, Use splint as directed (Gradually discontinue ankle splint as discussed.)      Restrictions: Disabled until next office visit       Return Appointment: 4/19/2018 at 10:00 AM

## 2018-04-19 ENCOUNTER — CLINICAL SUPPORT (OUTPATIENT)
Dept: REHABILITATION | Facility: HOSPITAL | Age: 73
End: 2018-04-19
Attending: ORTHOPAEDIC SURGERY
Payer: COMMERCIAL

## 2018-04-19 ENCOUNTER — OFFICE VISIT (OUTPATIENT)
Dept: URGENT CARE | Facility: CLINIC | Age: 73
End: 2018-04-19
Payer: COMMERCIAL

## 2018-04-19 DIAGNOSIS — I10 ESSENTIAL HYPERTENSION: ICD-10-CM

## 2018-04-19 DIAGNOSIS — M25.60 JOINT STIFFNESS: ICD-10-CM

## 2018-04-19 DIAGNOSIS — M25.50 ARTHRALGIA, UNSPECIFIED JOINT: ICD-10-CM

## 2018-04-19 DIAGNOSIS — S63.502D SPRAIN OF LEFT WRIST, SUBSEQUENT ENCOUNTER: ICD-10-CM
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
## 2018-04-19 DIAGNOSIS — R53.1 WEAKNESS: ICD-10-CM

## 2018-04-19 DIAGNOSIS — S93.492D SPRAIN OF ANTERIOR TALOFIBULAR LIGAMENT OF LEFT ANKLE, SUBSEQUENT ENCOUNTER: Primary | ICD-10-CM

## 2018-04-19 DIAGNOSIS — S62.346D CLOSED NONDISPLACED FRACTURE OF BASE OF FIFTH METACARPAL BONE OF RIGHT HAND WITH ROUTINE HEALING: ICD-10-CM

## 2018-04-19 PROBLEM — S93.492A SPRAIN OF ANTERIOR TALOFIBULAR LIGAMENT OF LEFT ANKLE: Status: ACTIVE | Noted: 2018-04-19

## 2018-04-19 PROBLEM — S63.502A SPRAIN OF LEFT WRIST: Status: ACTIVE | Noted: 2018-04-19

## 2018-04-19 PROCEDURE — G8987 SELF CARE CURRENT STATUS: HCPCS | Mod: CL

## 2018-04-19 PROCEDURE — 99214 OFFICE O/P EST MOD 30 MIN: CPT | Mod: S$GLB,,, | Performed by: PHYSICIAN ASSISTANT

## 2018-04-19 PROCEDURE — 97110 THERAPEUTIC EXERCISES: CPT

## 2018-04-19 PROCEDURE — G8988 SELF CARE GOAL STATUS: HCPCS | Mod: CK

## 2018-04-19 PROCEDURE — 97165 OT EVAL LOW COMPLEX 30 MIN: CPT

## 2018-04-19 RX ORDER — ATENOLOL AND CHLORTHALIDONE TABLET 50; 25 MG/1; MG/1
1 TABLET ORAL DAILY
Qty: 90 TABLET | Refills: 1 | Status: SHIPPED | OUTPATIENT
Start: 2018-04-19 | End: 2018-09-10 | Stop reason: SDUPTHER

## 2018-04-19 RX ORDER — IBUPROFEN 400 MG/1
400 TABLET ORAL EVERY 6 HOURS PRN
Qty: 30 TABLET | Refills: 0 | Status: SHIPPED | OUTPATIENT
Start: 2018-04-19 | End: 2019-10-22 | Stop reason: ALTCHOICE

## 2018-04-19 NOTE — PROGRESS NOTES
Subjective:       Patient ID: Marsha Chun is a 73 y.o. female.    Chief Complaint: Ankle Pain (left) and Hand Pain (bilateral)    F/u for left ankle pain (doi 3/22/18) Pt reports lateral left ankle pain with prolonged walking. Pt has been doing her home exercises, warm soaks, and takes percocet as needed for pain. Pt states when she experiences ankle pain it is 4/10 on pain scale. She begins occupational therapy for her right hand on today and has a f/u with Dr Ojeda, 5/2/18.       Ankle Pain    The incident occurred more than 1 week ago. The incident occurred at work. The injury mechanism was a fall. The pain is present in the left ankle. The pain is at a severity of 4/10. The pain has been intermittent since onset. Pertinent negatives include no numbness. The symptoms are aggravated by weight bearing. She has tried acetaminophen and heat for the symptoms. The treatment provided moderate relief.   Hand Pain    The incident occurred more than 1 week ago. The incident occurred at work. The injury mechanism was a fall. The pain is present in the left hand and right hand. Pertinent negatives include no chest pain or numbness. She has tried acetaminophen for the symptoms.     Review of Systems   Constitution: Negative for chills, fever and weakness.   HENT: Negative for congestion, ear pain, hearing loss and nosebleeds.    Eyes: Negative for blurred vision, pain and redness.   Cardiovascular: Negative for chest pain, palpitations and syncope.   Respiratory: Negative for cough, shortness of breath and wheezing.    Hematologic/Lymphatic: Negative for bleeding problem.   Skin: Negative for color change, dry skin, itching and rash.   Musculoskeletal: Positive for joint pain. Negative for arthritis, back pain, gout, joint swelling, muscle weakness, neck pain and stiffness.   Gastrointestinal: Negative for abdominal pain, constipation, diarrhea, nausea and vomiting.   Genitourinary: Negative for dysuria, frequency  and hematuria.   Neurological: Negative for dizziness, headaches, numbness, paresthesias and seizures.   Psychiatric/Behavioral: The patient is not nervous/anxious.    Allergic/Immunologic: Negative for hives.   All other systems reviewed and are negative.      Objective:      Physical Exam   Constitutional: She appears well-developed and well-nourished. She is active. No distress.   HENT:   Head: Normocephalic and atraumatic.   Right Ear: Hearing and external ear normal.   Left Ear: Hearing and external ear normal.   Nose: Nose normal. No nasal deformity. No epistaxis.   Mouth/Throat: Oropharynx is clear and moist and mucous membranes are normal.   Eyes: Conjunctivae and lids are normal. No scleral icterus.   Neck: Trachea normal and normal range of motion.   Cardiovascular: Intact distal pulses and normal pulses.    Pulmonary/Chest: Effort normal. No stridor. No respiratory distress.   Musculoskeletal:        Left ankle: She exhibits normal range of motion, no swelling, no ecchymosis and normal pulse. Tenderness. AITFL and CF ligament tenderness found. No head of 5th metatarsal tenderness found. Achilles tendon normal.   Neurological: She is alert. She has normal strength. She is not disoriented. No sensory deficit. GCS eye subscore is 4. GCS verbal subscore is 5. GCS motor subscore is 6.   Skin: Skin is warm, dry and intact. Capillary refill takes less than 2 seconds. She is not diaphoretic.   Psychiatric: She has a normal mood and affect. Her speech is normal and behavior is normal. She is attentive.   Nursing note reviewed.      Assessment:       1. Sprain of anterior talofibular ligament of left ankle, subsequent encounter    2. Closed nondisplaced fracture of base of fifth metacarpal bone of right hand with routine healing    3. Sprain of left wrist, subsequent encounter    4. Fall, subsequent encounter        Plan:       F/U Dr. Ojeda for hand fx management.     Medications Ordered This Encounter       ibuprofen (ADVIL,MOTRIN) 400 MG tablet          Sig: Take 1 tablet (400 mg total) by mouth every 6 (six) hours as needed for Other. Take with food.          Dispense:  30 tablet          Refill:  0  Patient Instructions: Daily home exercises/warm soaks   Restrictions: Disabled until next office visit  Follow-up in about 1 week (around 4/26/2018).

## 2018-04-19 NOTE — PATIENT INSTRUCTIONS
DIP Flexion (Active Blocked)        Hold ______ finger firmly at the middle so that only the tip joint can bend. Hold ____ seconds.  Repeat ____ times. Do ____ sessions per day.    Copyright © I. All rights reserved.   DIP Flexion (Active)        Bend only the fingertips until they touch the upper part of the palm. Keep large knuckles straight.  Repeat ____ times. Do ____ sessions per day.    Copyright © I. All rights reserved.   Extension (Active With Finger Flexion)        With fingers curled, bend hand back at wrist. Hold ____ seconds.  Repeat ____ times. Do ____ sessions per day.    Copyright © I. All rights reserved.   Extension (Active With Finger Extension)        With forearm on table and wrist over edge, lift hand with fingers straight. Hold ____ seconds.  Repeat ____ times. Do ____ sessions per day.    Copyright © I. All rights reserved.

## 2018-04-19 NOTE — PLAN OF CARE
"  Occupational Therapy Evaluation - Hand, Wrist, and/or Elbow Condition     Date: 2018  Name: Marsha Chun  YOB: 1945  Chart Number: [id number]  Referring Physician: Tadeo Ojeda Jr., *  Diagnosis:   1. Joint stiffness     2. Arthralgia, unspecified joint     3. Weakness       ICD 10: S62.346D (ICD-10-CM) - Closed nondisplaced fracture of base of fifth metacarpal bone of right hand with routine healing  Involved Side: right  Hand Dominance: right  Date of Onset: 3/22/18  Date of Injury/Surgery: NA  Surgical Procedure: none  Mechanism of Injury: Fall  Additional Info: Pt arrives to therapy with b/l wrist braces and boot on left foot  Date of Return to MD: 18  Past Medical History/Comorbidities:   Past Medical History:   Diagnosis Date    History of TB skin testing     Hyperparathyroidism, unspecified     Hypertension     Osteopenia     Overweight(278.02)        Prior Functional Status: Full  Occupation: [employer]   Employer: [employer]   Job Duties/Responsibilities: walking to show customers cars, paperwork  Current Work Status: Off duty  Home Environment: Pt lives alone and has a large house to clean  Leisure/Social Activities: Going to football games  Cultural/Spiritual: None  Barriers to Learning: none  Hearing/Vision Loss: none    Visit #:  20. Visits  on 18.    Subjective   "the small finger is painful and stiff"  Pain Report (severity and location)  Current: 0 out of 10  With activity: 7 out of 10   Report of Functional Deficits/Chief Complaints: putting close on and writing     Objective   Observation: Pt arrives to therapy with b/l hand braces     Edema. Measured in centimeters.  Date 2018      Left Right     2in. Above elbow       2in. Below elbow       Wrist Crease 16.5 16     Figure of 8       MCPs 19 19.5     Wrist ROM. Measured in degrees.  Date 2018      Left Right     Supination/Pronation WFL WFL     Wrist ext/flex " 50/40 55/60     Wrist RD/UD 25/15 25/30         Hand ROM. Measured in degrees.  Date 4/19/2018 4/19/2018      Left Right            Index: MP  46 89                PIP     66 94                DIP 36 40                [place]              Long:  MP 48 90                PIP 86 86                DIP 40 50                [place]              Ring:   MP 45 92                PIP 87 84                DIP 35 55                [place]              Small:  MP 44 88                 PIP 30 89                 DIP 5 56                [place]              Thumb: MP 56 56                  IP 40 50         Rad ADD/ABD 45 70         Pal ADD/ABD 50 53            Opposition To long finger full          Strength (Dyanmometer) and Pinch Strength (Pinch Gauge)  Measured in pounds.  Date 4/19/2018 4/19/2018          Left Right     Rung II Deferred Deferred     [place] Pinch Deferred Deferred     3pt Pinch Deferred Deferred     2pt Pinch Deferred Deferred         Sensation patient denies paresthesias    Manual Muscle Test  Date 4/19/2018 4/19/2018      Left Right     Wrist Extension  Deferred Deferred     Wrist Flexion Deferred Deferred     Radial Deviation Deferred Deferred     Ulnar Deviation Deferred Deferred     Supination Deferred Deferred     Pronation Deferred Deferred     Elbow Extension       Elbow Flexion             Functional Limitation Reporting   Tool: FOTO  Category: Self Care  Visit DATE SCORE Current  G-Code Goal  G-Code   Intake 4/19/2018 33/100 CL CK 59/100   5TH       10TH       Discharge          Score interpretation is as follows:     TEST SCORE  Modifier  Impairment Limitation Restriction    0%  CH  0 % impaired, limited or restricted    1-19%  CI  @ least 1% but less than 20% impaired, limited or restricted    20-39%  CJ  @ least 20%<40% impaired, limited or restricted    40-59%  CK  @ least 40%<60% impaired, limited or restricted    60-79%  CL  @ least 60% <80% impaired, limited or restricted    80-99%  CM  @ least 80%<100%  impaired limited or restricted    100%  CN  100% impaired, limited or restricted             Treatment   DIP Flexion (Active Blocked)        Hold ___Small finger___ finger firmly at the middle so that only the tip joint can bend. Hold __2__ seconds.  Repeat __3-5_ times. Do _6-8_ sessions per day.    Copyright © [facility]. All rights reserved.   DIP Flexion (Active)        Bend only the fingertips until they touch the upper part of the palm. Keep large knuckles straight.  Repeat __3-5__ times. Do __4-8__ sessions per day.    Copyright © [facility]. All rights reserved.   Extension (Active With Finger Flexion)        With fingers curled, bend hand back at wrist. Hold ____ seconds.  Repeat __4-8__ times. Do _3-5___ sessions per day.    Copyright © I. All rights reserved.   Extension (Active With Finger Extension)        With forearm on table and wrist over edge, lift hand with fingers straight. Hold ____ seconds.  Repeat ___4-8_ times. Do __3-5__ sessions per day.    Copyright © [facility]. All rights reserved.       Instructed in edema control strategies  Pain management with thermal modalities  Light AROM of motion   Safety protocol with fracture and healing timeline      Charges   Start Time: 1:15 pm  End Time: 2:15 pm  Total Time: 60  Initial eval-[id number] 40-low   Fluidotherapy-92004    Paraffin-59795    Moist Hot Pack-52359    Ultrasound-93379    Therapeutic Exercise-80080 20   Therapeutic Activity-18718    Manual Therapy-11930        Assessment   Patient is a 73 y.o. year old female with a diagnosis of right hand metacarpal fracture. The patient arrives to therapy with B/L wrist splints. The patient has no fractures on the left hand. AROM presents with significant limitations with AROM and strength bilaterally. The patient demonstrates self limiting behavior and fear of motion. The patient was very painful during assessment and was instructed in basic HEP. The patient fruther Limitations affecting independence with the patient's  normal, daily routine include dressing, feeding, bathing and housework.       Profile and History Assessment of Occupational Performance Level of Clinical Decision Making Complexity Score   Occupational Profile:   Marsha Chun is a 73 y.o. 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     Marsha Chun has difficulty with  feeding, bathing, grooming and dressing  housework/household chores and medication management  affecting his/her daily functional abilities. His/her main goal for therapy is to return to work..     Comorbidities:   please see medical history    Medical and Therapy History Review:   Brief               Performance Deficits    Physical:  Joint Mobility  Joint Stability  Muscle Power/Strength  Muscle Endurance  Skin Integrity/Scar Formation  Control of Voluntary Movement   Strength  Pinch Strength  Gross Motor Coordination  Fine Motor Coordination  Tactile Functions  Postural Control  Pain    Cognitive:  No Deficits    Psychosocial:    No Deficits     Clinical Decision Making:  Low    Assessment Process:  Problem-Focused Assessments    Body Structures:  Related to movement    Body Functions:  Musculoskeletal Functions  Movement Functions  Skin Functions  Sensory Functions  Neuromuscular Functions  Cardiovascular Functions  Mental Functions  Voice/Speech Functions    Modification/Need for Assistance:  Yes for FOTO     Intervention Selection:    Multiple Treatment Options       low  Based on PMHX, co morbidities , data from assessments and functional level of assistance required with task and clinical presentation directly impacting function.       Goals       Goals to be met in 4 weeks: (5/19/18)  1) Initiate Hep   2) Pt to increase AROM of wrist with ext/flx by 5 degrees by 4 weeks.  3) Pt to increase / pinch strength by 5 pounds by 4 weeks.  4) Pt to decrease pain to less than or equal to 3/10 by 4 weeks.   5) Patient will be able to achieve  less than or equal to 30% on the FOTO, demonstrating overall improved functional ability with upper extremity.      Goals to be met by discharge:  1) Independent with HEP  2) Pt to increase AROM of wrist to WNL as compared to R wrist by d/c.   3) Pt to increase strength by 10 pounds or WNL as compared to RUE by d/c.   4) Pt to decrease pain to trace or none by d/c.   5) Patient will be able to achieve less than or equal to 20% on the FOTO, demonstrating overall improved functional ability with upper extremity.       Plan     Initiate skilled occupational therapy services 2x/week for 8-12 weeks from 4/19/2018 to 7/19/19.    Treatment interventions to include:  Heat modalities (11773 or 62105 or 59799)  Cold modalities (60826)  Pain management modalities (26505)  Scar management (17196 or 13335)  Edema control techniques (16227)  Manual therapy (53858)  Splinting (pending L code or 95813 or 70596)  Therapeutic exercises (17107)  Therapeutic activities (30563)  ADL training (05223 or 32695 or 89742)  Work simulation/Work conditioning (04487 or 89793)  Astym and/or IASTM (13329)  Strengthening and Endurance training (17760 or 79020 or 92973 or 80525)  Kinesiotaping (01769)  HEP instruction (99085)   NMES 49344 or (43661)  Patient/Caregiver instruction (23556)    Therapist: NESHA Mann, OTR/L     I certify the need for these services furnished under this plan of treatment and while under my care    ____________________________________                         __________________  Physician/Referring Practitioner                                               Date of Signature

## 2018-04-26 ENCOUNTER — OFFICE VISIT (OUTPATIENT)
Dept: URGENT CARE | Facility: CLINIC | Age: 73
End: 2018-04-26
Payer: COMMERCIAL

## 2018-04-26 DIAGNOSIS — S63.502D SPRAIN OF LEFT WRIST, SUBSEQUENT ENCOUNTER: ICD-10-CM

## 2018-04-26 DIAGNOSIS — S62.346D CLOSED NONDISPLACED FRACTURE OF BASE OF FIFTH METACARPAL BONE OF RIGHT HAND WITH ROUTINE HEALING: ICD-10-CM

## 2018-04-26 DIAGNOSIS — S93.492D SPRAIN OF ANTERIOR TALOFIBULAR LIGAMENT OF LEFT ANKLE, SUBSEQUENT ENCOUNTER: Primary | ICD-10-CM

## 2018-04-26 PROCEDURE — 99214 OFFICE O/P EST MOD 30 MIN: CPT | Mod: S$GLB,,, | Performed by: PHYSICIAN ASSISTANT

## 2018-04-26 NOTE — LETTER
[facility] [place]  [address], [address]  [place] [address]  Phone: [phone]  Fax: [phone]    Pt Name: Marsha Chun  Injury Date: 03/22/2018   Employee ID: [id number] Date : 04/26/2018   Company: [employer]            Appointment Time: 10:30 AM Arrived: 10:35 AM CDT   Physician: Vijay Doshi PA-C Time out: 11:33 AM       Office Treatment: Marsha was seen today for ankle pain and hand pain.    Diagnoses and all orders for this visit:    Sprain of anterior talofibular ligament of left ankle, subsequent encounter    Sprain of left wrist, subsequent encounter    Closed nondisplaced fracture of base of fifth metacarpal bone of right hand with routine healing       Patient Instructions: Use splint as directed (Continue ibuprofen as needed.)      Restrictions: Limited use of right hand and arm,   Discharged to Ortho/Neuro/Opthomologist/Surgeon   (Follow up with Dr. Ojeda as scheduled.)

## 2018-04-26 NOTE — PROGRESS NOTES
Subjective:       Patient ID: Marsha Chun is a 73 y.o. female.    Chief Complaint: Ankle Pain (Left) and Hand Pain (Left)    F/u for left ankle and hand pain (doi 3/22/18) Pt reports her ankle is feeling better. She has pain with certain movements while performing her home exercises, and with prolonged standing and walking. Pt also c/o left hand pain at the base of her thumb, increases with use. Pt uses pain cream on her ankle and takes naprosyn and percocet as needed for her pain.       Ankle Pain    The incident occurred more than 1 week ago. The incident occurred at work. The injury mechanism was a fall. The pain is present in the left ankle. The pain is at a severity of 3/10. The pain is mild. The pain has been improving since onset. Pertinent negatives include no numbness. She reports no foreign bodies present. The symptoms are aggravated by movement and weight bearing. She has tried NSAIDs and acetaminophen for the symptoms. The treatment provided moderate relief.   Hand Pain    The incident occurred more than 1 week ago. The incident occurred at work. The injury mechanism was a fall. The pain is present in the left hand. The pain is at a severity of 4/10. The pain is moderate. The pain has been intermittent since the incident. Pertinent negatives include no chest pain or numbness. The symptoms are aggravated by movement. She has tried NSAIDs and acetaminophen for the symptoms. The treatment provided mild relief.     Review of Systems   Constitution: Negative for chills, fever and weakness.   HENT: Negative for congestion, ear pain, hearing loss and nosebleeds.    Eyes: Negative for blurred vision, pain and redness.   Cardiovascular: Negative for chest pain, palpitations and syncope.   Respiratory: Negative for cough, shortness of breath and wheezing.    Hematologic/Lymphatic: Negative for bleeding problem.   Skin: Negative for color change, dry skin, itching and rash.   Musculoskeletal: Positive for  joint pain. Negative for arthritis, back pain, gout, joint swelling, muscle weakness, neck pain and stiffness.   Gastrointestinal: Negative for abdominal pain, constipation, diarrhea, nausea and vomiting.   Genitourinary: Negative for dysuria, frequency and hematuria.   Neurological: Negative for dizziness, headaches, numbness, paresthesias and seizures.   Psychiatric/Behavioral: The patient is not nervous/anxious.    Allergic/Immunologic: Negative for hives.   All other systems reviewed and are negative.      Objective:      Physical Exam   Constitutional: She appears well-developed and well-nourished. She is active. No distress.   HENT:   Head: Normocephalic and atraumatic.   Right Ear: Hearing and external ear normal.   Left Ear: Hearing and external ear normal.   Nose: Nose normal. No nasal deformity. No epistaxis.   Mouth/Throat: Oropharynx is clear and moist and mucous membranes are normal.   Eyes: Conjunctivae and lids are normal. No scleral icterus.   Neck: Trachea normal and normal range of motion.   Cardiovascular: Intact distal pulses and normal pulses.    Pulmonary/Chest: Effort normal. No stridor. No respiratory distress.   Musculoskeletal:        Left wrist: She exhibits tenderness and swelling. She exhibits normal range of motion.        Left ankle: She exhibits normal range of motion, no swelling, no ecchymosis, no deformity and normal pulse. Tenderness. AITFL tenderness found. Achilles tendon normal.        Right hand: She exhibits decreased range of motion, tenderness and bony tenderness. She exhibits normal capillary refill. Normal sensation noted.   Neurological: She is alert. She has normal strength. She is not disoriented. No sensory deficit. GCS eye subscore is 4. GCS verbal subscore is 5. GCS motor subscore is 6.   Skin: Skin is warm, dry and intact. Capillary refill takes less than 2 seconds. She is not diaphoretic.   Psychiatric: She has a normal mood and affect. Her speech is normal and  behavior is normal. She is attentive.   Nursing note reviewed.      Assessment:       1. Sprain of anterior talofibular ligament of left ankle, subsequent encounter    2. Sprain of left wrist, subsequent encounter    3. Closed nondisplaced fracture of base of fifth metacarpal bone of right hand with routine healing        Plan:            Patient Instructions: Use splint as directed (Continue ibuprofen as needed.)   Restrictions: Limited use of right hand and arm, Discharged to Ortho/Neuro/Opthomologist/Surgeon (Follow up with Dr. Ojeda as scheduled.)  Follow-up if symptoms worsen or fail to improve.

## 2018-05-01 ENCOUNTER — CLINICAL SUPPORT (OUTPATIENT)
Dept: REHABILITATION | Facility: HOSPITAL | Age: 73
End: 2018-05-01
Payer: COMMERCIAL

## 2018-05-01 DIAGNOSIS — M25.60 JOINT STIFFNESS: ICD-10-CM

## 2018-05-01 DIAGNOSIS — R53.1 WEAKNESS: ICD-10-CM

## 2018-05-01 DIAGNOSIS — M25.50 ARTHRALGIA, UNSPECIFIED JOINT: ICD-10-CM

## 2018-05-01 PROCEDURE — 97140 MANUAL THERAPY 1/> REGIONS: CPT

## 2018-05-01 PROCEDURE — 97110 THERAPEUTIC EXERCISES: CPT

## 2018-05-01 PROCEDURE — 97018 PARAFFIN BATH THERAPY: CPT

## 2018-05-01 NOTE — PROGRESS NOTES
"   Occupational Therapy Daily Progress Evauation - Hand, Wrist, and/or Elbow Condition      Date: 2018    Name: Marsha Chun  YOB: 1945  Chart Number: [id number]  Referring Physician: Tadeo Ojeda Jr., *  Diagnosis:   1. Joint stiffness      2. Arthralgia, unspecified joint      3. Weakness         ICD 10: S62.346D (ICD-10-CM) - Closed nondisplaced fracture of base of fifth metacarpal bone of right hand with routine healing  Involved Side: right  Hand Dominance: right  Date of Onset: 3/22/18  Date of Injury/Surgery: NA  Surgical Procedure: none  Mechanism of Injury: Fall  Additional Info: Pt arrives to therapy with b/l wrist braces and boot on left foot  Prior Functional Status: Full  Occupation: [employer]   Employer: [employer]   Job Duties/Responsibilities: walking to show customers cars, paperwork  Current Work Status: Off duty  Home Environment: Pt lives alone and has a large house to clean  Leisure/Social Activities: Going to football games  Cultural/Spiritual: None  Barriers to Learning: none  Hearing/Vision Loss: none     Visit #: 2 of 20. Visits  on 18.     Subjective   "Patient arrives to therapy with small scratches on right hand she reports she was gardening"  Pain Report (severity and location)  Current: 8 out of 10  With activity: 8 out of 10   Report of Functional Deficits/Chief Complaints: putting close on and writing      Objective   Observation: Pt arrives to therapy with b/l hand braces      Edema. Measured in centimeters.  Date 2018         Left Right       2in. Above elbow           2in. Below elbow           Wrist Crease 16.5 16       Figure of 8           MCPs 19 19.5       Wrist ROM. Measured in degrees.  Date 2018         Left Right       Supination/Pronation WFL WFL       Wrist ext/flex 50/40 55/60       Wrist RD/UD 25/15 25/30             Hand ROM. Measured in degrees.  Date 2018         Left Right     "               Index: MP  46 89                  PIP     66 94                  DIP 36 40                  OAKLEY                       Long:  MP 48 90                  PIP 86 86                  DIP 40 50                  OAKLEY                       Ring:   MP 45 92                  PIP 87 84                  DIP 35 55                  OAKLEY                       Small:  MP 44 88                   PIP 30 89                   DIP 5 56                  OAKLEY                       Thumb: MP 56 56                    IP 40 50           Rad ADD/ABD 45 70           Pal ADD/ABD 50 53              Opposition To long finger full              Strength (Dyanmometer) and Pinch Strength (Pinch Gauge)  Measured in pounds.  Date 4/19/2018 4/19/2018               Left Right       Rung II Deferred Deferred       Key Pinch Deferred Deferred       3pt Pinch Deferred Deferred       2pt Pinch Deferred Deferred             Sensation patient denies paresthesias     Manual Muscle Test  Date 4/19/2018 4/19/2018         Left Right       Wrist Extension  Deferred Deferred       Wrist Flexion Deferred Deferred       Radial Deviation Deferred Deferred       Ulnar Deviation Deferred Deferred       Supination Deferred Deferred       Pronation Deferred Deferred       Elbow Extension           Elbow Flexion                    Functional Limitation Reporting   Tool: FOTO  Category: Self Care  Visit DATE SCORE Current  G-Code Goal  G-Code   Intake 4/19/2018 33/100 CL CK 59/100   5TH           10TH           Discharge              Score interpretation is as follows:      TEST SCORE  Modifier  Impairment Limitation Restriction    0%  CH  0 % impaired, limited or restricted    1-19%  CI  @ least 1% but less than 20% impaired, limited or restricted    20-39%  CJ  @ least 20%<40% impaired, limited or restricted    40-59%  CK  @ least 40%<60% impaired, limited or restricted    60-79%  CL  @ least 60% <80% impaired, limited or restricted    80-99%  CM  @ least  80%<100% impaired limited or restricted    100%  CN  100% impaired, limited or restricted                  Treatment   Patient received paraffin bath to b/l and(s) for 8 minutes to increase blood flow, circulation, pain management and for tissue elasticity prior to therex.   DIP Flexion (Active Blocked)  Bend only the fingertips until they touch the upper part of the palm. Keep large knuckles straight.  Extension (Active With Finger Extension)  With forearm on table and wrist over edge, lift hand with fingers straight.   PROM stretching to metacarpals 1-4. Mobilization of carpals avoidance stress along the 5th metacarpal.      Light AROM of motion opposition  small pom pom  Gentle thumb mobilization CMC joint   retrograde massage to thenar musculature   Applied ice post therapy  Pain management with thermal modalities         Charges   Start Time: 2:20 pm  End Time: 3:10 pm  Total Time: 50  Initial eval-[id number]    Fluidotherapy-[id number]     Paraffin-66064  x5   Moist Hot Pack-57251     Ultrasound-39781     Therapeutic Exercise-90049 20   Therapeutic Activity-30157     Manual Therapy-06746 20          Assessment   Patient is a 73 y.o. year old female with a diagnosis of right hand metacarpal fracture. Pt continues to exhibit much pain with light fine motor therex.for whole hand. PROM to uninvolved digits were better renny. Pt arrives to therapy with current wrist braces. Patient appeared guarded with motion throughout therapy. Following therapy pt observed easily grasping purse and appointment card. Pt still appears with improvement compared to initial visit.        Goals         Goals to be met in 4 weeks: (5/19/18)  1) Initiate Hep   2) Pt to increase AROM of wrist with ext/flx by 5 degrees by 4 weeks.  3) Pt to increase / pinch strength by 5 pounds by 4 weeks.  4) Pt to decrease pain to less than or equal to 3/10 by 4 weeks.   5) Patient will be able to achieve less than or equal to 30% on the FOTO,  demonstrating overall improved functional ability with upper extremity.      Goals to be met by discharge:  1) Independent with HEP  2) Pt to increase AROM of wrist to WNL as compared to R wrist by d/c.   3) Pt to increase strength by 10 pounds or WNL as compared to RUE by d/c.   4) Pt to decrease pain to trace or none by d/c.   5) Patient will be able to achieve less than or equal to 20% on the FOTO, demonstrating overall improved functional ability with upper extremity.         Plan      Initiate skilled occupational therapy services 2x/week for 8-12 weeks from 4/19/2018 to 7/19/19.     Therapist: NESHA Mann, OTR/L      I certify the need for these services furnished under this plan of treatment and while under my care

## 2018-05-02 ENCOUNTER — HOSPITAL ENCOUNTER (OUTPATIENT)
Dept: RADIOLOGY | Facility: OTHER | Age: 73
Discharge: HOME | End: 2018-05-02
Attending: PHYSICIAN ASSISTANT
Payer: COMMERCIAL

## 2018-05-02 ENCOUNTER — OFFICE VISIT (OUTPATIENT)
Dept: ORTHOPEDICS | Facility: CLINIC | Age: 73
End: 2018-05-02
Attending: ORTHOPAEDIC SURGERY
Payer: COMMERCIAL

## 2018-05-02 VITALS — BODY MASS INDEX: 28.99 KG/M2 | HEIGHT: 65 IN | WEIGHT: 174 LBS

## 2018-05-02 DIAGNOSIS — M79.641 RIGHT HAND PAIN: ICD-10-CM

## 2018-05-02 DIAGNOSIS — M79.641 RIGHT HAND PAIN: Primary | ICD-10-CM

## 2018-05-02 DIAGNOSIS — S62.346D CLOSED NONDISPLACED FRACTURE OF BASE OF FIFTH METACARPAL BONE OF RIGHT HAND WITH ROUTINE HEALING: ICD-10-CM

## 2018-05-02 PROCEDURE — 73130 X-RAY EXAM OF HAND: CPT | Mod: TC,FY,RT

## 2018-05-02 PROCEDURE — 99999 PR PBB SHADOW E&M-EST. PATIENT-LVL III: CPT | Mod: PBBFAC,,, | Performed by: ORTHOPAEDIC SURGERY

## 2018-05-02 PROCEDURE — 99213 OFFICE O/P EST LOW 20 MIN: CPT | Mod: S$GLB,,, | Performed by: ORTHOPAEDIC SURGERY

## 2018-05-02 PROCEDURE — 73130 X-RAY EXAM OF HAND: CPT | Mod: 26,RT,, | Performed by: INTERNAL MEDICINE

## 2018-05-02 RX ORDER — DICLOFENAC SODIUM 10 MG/G
2 GEL TOPICAL 3 TIMES DAILY
Qty: 1 TUBE | Refills: 3 | Status: SHIPPED | OUTPATIENT
Start: 2018-05-02 | End: 2018-05-12

## 2018-05-02 NOTE — PROGRESS NOTES
HISTORY OF PRESENT ILLNESS:  Ms. Chun in followup of right fifth metacarpal   base fracture about six weeks out from injury, is doing well with the right   hand, still having a fair amount of stiffness; however.  She has been in therapy   actually for both hands because of pain and stiffness in the opposite left hand   as well.    PHYSICAL EXAMINATION:  RIGHT HAND:  Mild tenderness at the fracture site.  Range of motion of fingers   still limited, but improved compared to last visit.  LEFT HAND:  A little tenderness at the base of the left thumb.  Range of motion   of fingers good.   strength decreased.    X-RAYS:  AP and lateral of right hand demonstrate healed fracture, nondisplaced   at the base of the fifth metacarpal.    PLAN:  Continue therapy both hands, increase activities, discontinue the brace   right hand, no heavy lifting yet.    Tramadol given for pain.  I have also ordered some Voltaren gel for topical use.    Follow up in one month.      SALAS  dd: 05/02/2018 15:48:09 (CDT)  td: 05/03/2018 11:36:27 (CDT)  Doc ID   #[id number]  Job ID #[id number]    CC:

## 2018-05-10 ENCOUNTER — CLINICAL SUPPORT (OUTPATIENT)
Dept: REHABILITATION | Facility: HOSPITAL | Age: 73
End: 2018-05-10
Payer: COMMERCIAL

## 2018-05-10 DIAGNOSIS — R53.1 WEAKNESS: ICD-10-CM

## 2018-05-10 DIAGNOSIS — M25.50 ARTHRALGIA, UNSPECIFIED JOINT: ICD-10-CM

## 2018-05-10 DIAGNOSIS — M25.60 JOINT STIFFNESS: ICD-10-CM

## 2018-05-10 PROCEDURE — 97018 PARAFFIN BATH THERAPY: CPT | Mod: 59

## 2018-05-10 PROCEDURE — 97110 THERAPEUTIC EXERCISES: CPT

## 2018-05-10 NOTE — PROGRESS NOTES
"OT Daily Progress Note    Patient:  Marsha Lopez [facility] #:  [id number]   Date of Note: 05/10/2018   Referring Physician:  Tadeo Ojeda Jr., *  Diagnosis:  R 5th metacarpal fracture non op and OA B wrist/hands   Encounter Diagnoses   Name Primary?    Joint stiffness     Arthralgia, unspecified joint     Weakness       Visit 3 of 12, expires 4/11/2019  NO FOTO    Start Time: 130  End Time: 215  Total Time: 45 min  Group Time: 0      Subjective:   "The small finger is mostly stiff, I think I can go back to work soon if I don't have to do any lifting, I can talk.  My left hand is hurting more"  Pain: 3 out of 10     Objective:   Patient seen by OT this session. Treatment  consist of the following:  Patient received paraffin bath to b/l and(s) for 10  minutes to increase blood flow, circulation, pain management and for tissue elasticity prior to therex.   DIP Flexion (Active Blocked) x 10 reps   Intrinsic +/- , composite and flat fisting x 10 reps each .   Instructed in julius taping for AAROM with therex and for functional activities/use.   Digit Extension (Active With Finger Extension), isolated and composite x 10 reps each     Brief MT including PROM stretching to metacarpals 1-4.    AROM to thumb/wrist on right   Added yellow theraputty on left for gross  x 10 reps, 20# PHG x 10 reps for  strengthening.   Applied ice post therapy  Pain management with thermal modalities     Treatment: Paraffin x 10 min and Therapeutic exercises x 35 min     Assessment:  ROM of 5th digit improved with julius taping.  Provided coban for home julius taping.  Continued complaints of R CMC joint pain. Progressing with light strengthening.  Pt will continue to benefit from skilled OT intervention.   Patient is making good progress toward established goals.   Patient continues to demonstrate limitation  with  ROM, Joint mobility, Stiffness, Decreased gross motor coordination, Decreased functional use, Decreased strength " and Continued pain       Goals         Goals to be met in 4 weeks: (5/19/18)  1) Initiate Hep   2) Pt to increase AROM of wrist with ext/flx by 5 degrees by 4 weeks.  3) Pt to increase / pinch strength by 5 pounds by 4 weeks.  4) Pt to decrease pain to less than or equal to 3/10 by 4 weeks.   5) Patient will be able to achieve less than or equal to 30% on the FOTO, demonstrating overall improved functional ability with upper extremity.      Goals to be met by discharge:  1) Independent with HEP  2) Pt to increase AROM of wrist to WNL as compared to R wrist by d/c.   3) Pt to increase strength by 10 pounds or WNL as compared to RUE by d/c.   4) Pt to decrease pain to trace or none by d/c.   5) Patient will be able to achieve less than or equal to 20% on the FOTO, demonstrating overall improved functional ability with upper extremity.         Plan      Initiate skilled occupational therapy services 2x/week for 8-12 weeks from 4/19/2018 to 7/19/19.

## 2018-05-17 ENCOUNTER — TELEPHONE (OUTPATIENT)
Dept: ORTHOPEDICS | Facility: CLINIC | Age: 73
End: 2018-05-17

## 2018-05-17 ENCOUNTER — CLINICAL SUPPORT (OUTPATIENT)
Dept: REHABILITATION | Facility: HOSPITAL | Age: 73
End: 2018-05-17
Payer: COMMERCIAL

## 2018-05-17 DIAGNOSIS — R53.1 WEAKNESS: ICD-10-CM

## 2018-05-17 DIAGNOSIS — M25.60 JOINT STIFFNESS: ICD-10-CM

## 2018-05-17 DIAGNOSIS — M25.50 ARTHRALGIA, UNSPECIFIED JOINT: ICD-10-CM

## 2018-05-17 PROCEDURE — 97110 THERAPEUTIC EXERCISES: CPT

## 2018-05-17 PROCEDURE — 97018 PARAFFIN BATH THERAPY: CPT | Mod: 59

## 2018-05-17 NOTE — PROGRESS NOTES
"OT Daily Progress Note    Patient:  Marsha Lopez [facility] #:  [id number]   Date of Note: 05/17/2018   Referring Physician:  Tadeo Ojeda Jr., *  Diagnosis:  R 5th metacarpal fracture non op and OA B wrist/hands non op 3/25/2018   Encounter Diagnoses   Name Primary?    Joint stiffness     Arthralgia, unspecified joint     Weakness       Visit 4 of 12, expires 4/11/2019  NO FOTO    Start Time: 230  End Time: 315  Total Time: 45 min  Group Time: 0      MD 5/30/2018     Subjective:   "I put tape (athletic tape) around my hand, because it feels better, like when you tape it.  The left hurts at the base of the thumb"  Pain: 3 out of 10     Objective:   Patient seen by OT this session. Treatment  consist of the following:  Patient received paraffin bath to b/l and(s) for 10 minutes to increase blood flow, circulation, pain management and for tissue elasticity prior to therex.   DIP Flexion (Active Blocked)  Intrinsic -/+, (hook, wave), composite and flat fist,   Extension (Active With Finger Extension)  With forearm on table and wrist over edge, lift hand with fingers straight.   PROM stretching to metacarpals 1-4. Mobilization of carpals avoidance stress along the 5th metacarpal.      Light AROM of motion opposition  small pom pom  Gentle thumb distraction at  CMC joint   retrograde massage to thenar musculature   Yellow putty for gross  x 10 reps, right   Blue digi extender for EDM x 10 reps   20 #  PHG for gross  x 10 reps   Patient re-taped R wrist with her own athletic tape following treatment session.  Encouraged to monitor for skin breakdown or irritation and limit use to as needed.     Baseline as follows:    R) 10 lbs. L) 12 lbs.   Key pinch R) 3 psi  L) 2 psi   3 pt pinch R) 1 psi  L) 1.5 psi        Treatment: Paraffin x 10 min and Therapeutic exercises x 35 min     Assessment:  Progressing with /pinch strengthening.  ROM WNL's following buddy taping.  Primary complaints of left " CMC arthritis type pain.  Pt will continue to benefit from skilled OT intervention.   Patient is making good progress toward established goals.   Patient continues to demonstrate limitation  with  ROM, Joint mobility, Stiffness, Decreased gross motor coordination, Decreased functional use, Decreased strength and Continued pain       Goals         Goals to be met in 4 weeks: (5/19/18)  1) Initiate Hep   2) Pt to increase AROM of wrist with ext/flx by 5 degrees by 4 weeks.  3) Pt to increase / pinch strength by 5 pounds by 4 weeks.  4) Pt to decrease pain to less than or equal to 3/10 by 4 weeks.   5) Patient will be able to achieve less than or equal to 30% on the FOTO, demonstrating overall improved functional ability with upper extremity.       Goals to be met by discharge:  1) Independent with HEP  2) Pt to increase AROM of wrist to WNL as compared to R wrist by d/c.   3) Pt to increase strength by 10 pounds or WNL as compared to RUE by d/c.   4) Pt to decrease pain to trace or none by d/c.   5) Patient will be able to achieve less than or equal to 20% on the FOTO, demonstrating overall improved functional ability with upper extremity.         Plan      Initiate skilled occupational therapy services 2x/week for 8-12 weeks from 4/19/2018 to 7/19/19.

## 2018-05-18 ENCOUNTER — TELEPHONE (OUTPATIENT)
Dept: ORTHOPEDICS | Facility: CLINIC | Age: 73
End: 2018-05-18

## 2018-05-18 NOTE — TELEPHONE ENCOUNTER
Contacted pt again in regards to work status. No answer, ML with contact information and phone number for call back.

## 2018-05-21 ENCOUNTER — TELEPHONE (OUTPATIENT)
Dept: ORTHOPEDICS | Facility: CLINIC | Age: 73
End: 2018-05-21

## 2018-05-21 NOTE — TELEPHONE ENCOUNTER
Spoke with pt, stated currently off work. Understanding verbalized. Paperwork faxed again to [employer].

## 2018-05-21 NOTE — TELEPHONE ENCOUNTER
----- Message from Mahogany Marsh sent at 5/18/2018  2:32 PM CDT -----  Contact: [phone]/self  Patient called in returning your call. Please advise.

## 2018-05-25 ENCOUNTER — CLINICAL SUPPORT (OUTPATIENT)
Dept: REHABILITATION | Facility: HOSPITAL | Age: 73
End: 2018-05-25
Payer: COMMERCIAL

## 2018-05-25 DIAGNOSIS — R53.1 WEAKNESS: ICD-10-CM

## 2018-05-25 DIAGNOSIS — M25.60 JOINT STIFFNESS: ICD-10-CM

## 2018-05-25 DIAGNOSIS — M25.50 ARTHRALGIA, UNSPECIFIED JOINT: ICD-10-CM

## 2018-05-25 PROCEDURE — 97110 THERAPEUTIC EXERCISES: CPT

## 2018-05-25 PROCEDURE — 97018 PARAFFIN BATH THERAPY: CPT | Mod: 59

## 2018-05-25 NOTE — PROGRESS NOTES
"OT Daily Progress Note    Patient:  Marsha Lopez [facility] #:  [id number]   Date of Note: 05/25/2018   Referring Physician:  Tadeo Ojeda Jr., *  Diagnosis:  R 5th metacarpal fracture non op and OA B wrist/hands non op 3/25/2018   Encounter Diagnoses   Name Primary?    Joint stiffness     Arthralgia, unspecified joint     Weakness       Visit 5 of 12, expires 4/11/2019   FOTO???    Start Time: 200  End Time: 300  Total Time: 45 min  Group Time: 0      MD 5/30/2018     Subjective:   "I see the doctor next week, I think I can go back to work at [employer].  I'm still having pain in my left thumb"  Pain: 3 out of 10     Objective:   Patient seen by OT this session. Treatment  consist of the following:  Patient received paraffin bath to b/l and(s) for 10 minutes to increase blood flow, circulation, pain management and for tissue elasticity prior to therex.   DIP Flexion (Active Blocked)  Intrinsic -/+, (hook, wave), composite and flat fist,   Extension (Active With Finger Extension)  With forearm on table and wrist over edge, lift hand with fingers straight.   PROM stretching to metacarpals 1-4. Mobilization of carpals avoidance stress along the 5th metacarpal.      Light AROM of motion opposition  small pom pom  Gentle thumb distraction at  CMC joint , left   retrograde massage to thenar musculature   Yellow putty for gross , key and 2/3pt pinch  x 10 reps each, right   Blue digi extender for EDM x 10 reps   20 # /35#  PHG for gross  x 10 reps each   Patient re-taped R wrist with  kinesiotape following treatment session.  Encouraged to monitor for skin breakdown or irritation and limit use to as needed.     Baseline as follows:    R) 10 lbs. L) 12 lbs.   Key pinch R) 3 psi  L) 2 psi   3 pt pinch R) 1 psi  L) 1.5 psi     Wrist ROM. Measured in degrees.  Date 5/25/2018 5/25/2018         Left Right       Supination/Pronation WFL WFL       Wrist ext/flex 45/50 40/60       Wrist RD/UD 25/15 " 25/30          Small:  MP 0/88 0/75                   PIP 0/89 0/93                   DIP 0/56 0/62                  OAKLEY           Composite fist WFL's. Continued complaints of pain with  and pinch exercises.     NO FOTO RECORD FOUND??       Treatment: Paraffin x 10 min and Therapeutic exercises x 35 min     Assessment:  Progressing with /pinch strengthening.  ROM WNL's following julius taping.  Primary complaints of left CMC arthritis type pain.  Patient reports she feels she may be able to return to work on light duty.  Pt will continue to benefit from skilled OT intervention.   Patient is making good progress toward established goals.  Patient has met 3 of 5 STG's.   Patient continues to demonstrate limitation  with  ROM, Joint mobility, Stiffness, Decreased gross motor coordination, Decreased functional use, Decreased strength and Continued pain       Goals         Goals to be met in 4 weeks: (5/19/18)  1) Initiate Hep --MET  2) Pt to increase AROM of wrist with ext/flx by 5 degrees by 4 weeks.---met   3) Pt to increase / pinch strength by 5 pounds by 4 weeks.--NOT MET   4) Pt to decrease pain to less than or equal to 3/10 by 4 weeks. --MET   5) Patient will be able to achieve less than or equal to 30% on the FOTO, demonstrating overall improved functional ability with upper extremity. --UNABLE TO LOCATE PREVIOUS FOTO       Goals to be met by discharge:  1) Independent with HEP  2) Pt to increase AROM of wrist to WNL as compared to R wrist by d/c.   3) Pt to increase strength by 10 pounds or WNL as compared to RUE by d/c.   4) Pt to decrease pain to trace or none by d/c.   5) Patient will be able to achieve less than or equal to 20% on the FOTO, demonstrating overall improved functional ability with upper extremity.         Plan      Initiate skilled occupational therapy services 2x/week for 8-12 weeks from 4/19/2018 to 7/19/19.

## 2018-05-30 ENCOUNTER — CLINICAL SUPPORT (OUTPATIENT)
Dept: REHABILITATION | Facility: HOSPITAL | Age: 73
End: 2018-05-30
Payer: COMMERCIAL

## 2018-05-30 ENCOUNTER — OFFICE VISIT (OUTPATIENT)
Dept: ORTHOPEDICS | Facility: CLINIC | Age: 73
End: 2018-05-30
Attending: ORTHOPAEDIC SURGERY
Payer: COMMERCIAL

## 2018-05-30 VITALS — WEIGHT: 174 LBS | HEIGHT: 65 IN | BODY MASS INDEX: 28.99 KG/M2

## 2018-05-30 DIAGNOSIS — R53.1 WEAKNESS: ICD-10-CM

## 2018-05-30 DIAGNOSIS — M25.50 ARTHRALGIA, UNSPECIFIED JOINT: ICD-10-CM

## 2018-05-30 DIAGNOSIS — S62.346D CLOSED NONDISPLACED FRACTURE OF BASE OF FIFTH METACARPAL BONE OF RIGHT HAND WITH ROUTINE HEALING: Primary | ICD-10-CM

## 2018-05-30 DIAGNOSIS — M25.60 JOINT STIFFNESS: ICD-10-CM

## 2018-05-30 PROCEDURE — 99213 OFFICE O/P EST LOW 20 MIN: CPT | Mod: S$GLB,,, | Performed by: ORTHOPAEDIC SURGERY

## 2018-05-30 PROCEDURE — 97110 THERAPEUTIC EXERCISES: CPT

## 2018-05-30 PROCEDURE — 99999 PR PBB SHADOW E&M-EST. PATIENT-LVL II: CPT | Mod: PBBFAC,,, | Performed by: ORTHOPAEDIC SURGERY

## 2018-05-30 PROCEDURE — 97018 PARAFFIN BATH THERAPY: CPT | Mod: 59

## 2018-05-30 RX ORDER — CELECOXIB 200 MG/1
200 CAPSULE ORAL DAILY
Qty: 30 CAPSULE | Refills: 3 | Status: SHIPPED | OUTPATIENT
Start: 2018-05-30 | End: 2018-06-27 | Stop reason: SDUPTHER

## 2018-05-30 NOTE — PROGRESS NOTES
HISTORY OF PRESENT ILLNESS:  Ms. Chun about two months out from right fifth   metacarpal base fracture.  She is doing better.  She is still in physical   therapy, scheduled for a few more visits.  She reports less pain.  She is having   some stiffness of the fingers; however, probably related to arthritis,   previously noted on x-ray.    PHYSICAL EXAMINATION:  RIGHT HAND:  Fracture site is nontender.  No swelling.  Full range of motion of   digits except for a little bit of stiffness at the small joints in the hands,   especially the small finger.   strength slightly decreased.  Sensation   intact.    PLAN:  I have recommended she finish up therapy, switch to a home exercise   program.    She is not on any anti-inflammatories, so I will start her on Celebrex 200 mg   once a day with food.  She can return to work next week and follow up in one   month.      SALAS  dd: 05/30/2018 14:12:06 (CDT)  td: 05/31/2018 10:53:21 (CDT)  Doc ID   #[id number]  Job ID #[id number]    CC:

## 2018-05-30 NOTE — PROGRESS NOTES
"OT Daily Progress Note    Patient:  Marsha Lopez [facility] #:  [id number]   Date of Note: 05/30/2018   Referring Physician:  Tadeo Ojeda Jr., *  Diagnosis:  R 5th metacarpal fracture non op and OA B wrist/hands non op 3/25/2018   Encounter Diagnoses   Name Primary?    Joint stiffness     Arthralgia, unspecified joint     Weakness       Visit 6 of 12, expires 4/11/2019   FOTO???    Start Time: 245  End Time: 330  Total Time: 45 min  Group Time: 0    Subjective:   "Dr. Ojeda said I can go back to work.  I don't know my schedule, so I will have to call if I need to come back"   Pain: 3 out of 10     Objective:   Patient seen by OT this session. Treatment  consist of the following:  Patient received paraffin bath to b/l and(s) for 10 minutes to increase blood flow, circulation, pain management and for tissue elasticity prior to therex.   DIP Flexion (Active Blocked)  Intrinsic -/+, (hook, wave), composite and flat fist,   Extension (Active With Finger Extension)  With forearm on table and wrist over edge, lift hand with fingers straight.   PROM stretching to metacarpals 1-4. Mobilization of carpals avoidance stress along the 5th metacarpal.      Gentle thumb distraction at  CMC joint , left   retrograde massage to thenar musculature   Yellow putty for gross , key and 2/3pt pinch  x 10 reps each, right   Blue digi extender for EDM x 10 reps   20 # /35#  PHG for gross  x 10 reps each     Baseline as follows:    R) 10 lbs. L) 12 lbs.   Key pinch R) 3 psi  L) 2 psi   3 pt pinch R) 1 psi  L) 1.5 psi     Wrist ROM. Measured in degrees.  Date 5/25/2018 5/25/2018         Left Right       Supination/Pronation WFL WFL       Wrist ext/flex 45/50 40/60       Wrist RD/UD 25/15 25/30          Small:  MP 0/88 0/75                   PIP 0/89 0/93                   DIP 0/56 0/62                  [place]AKLEY           Composite fist WFL's. Continued complaints of pain with  and pinch exercises.     NO FOTO RECORD " FOUND??       Treatment: Paraffin x 10 min and Therapeutic exercises x 35 min     Assessment:  Progressing with /pinch strengthening.  ROM WNL's following julius taping.  Primary complaints of left CMC arthritis type pain.  Patient reports she feels she may be able to return to work on light duty.  Pt will continue to benefit from skilled OT intervention.   Patient is making good progress toward established goals.  Patient has met 3 of 5 STG's.   Patient continues to demonstrate limitation  with  ROM, Joint mobility, Stiffness, Decreased gross motor coordination, Decreased functional use, Decreased strength and Continued pain       Goals         Goals to be met in 4 weeks: (5/19/18)  1) Initiate Hep --MET  2) Pt to increase AROM of wrist with ext/flx by 5 degrees by 4 weeks.---met   3) Pt to increase / pinch strength by 5 pounds by 4 weeks.--NOT MET   4) Pt to decrease pain to less than or equal to 3/10 by 4 weeks. --MET   5) Patient will be able to achieve less than or equal to 30% on the FOTO, demonstrating overall improved functional ability with upper extremity. --UNABLE TO LOCATE PREVIOUS FOTO       Goals to be met by discharge:  1) Independent with HEP  2) Pt to increase AROM of wrist to WNL as compared to R wrist by d/c.   3) Pt to increase strength by 10 pounds or WNL as compared to RUE by d/c.   4) Pt to decrease pain to trace or none by d/c.   5) Patient will be able to achieve less than or equal to 20% on the FOTO, demonstrating overall improved functional ability with upper extremity.         Plan      Initiate skilled occupational therapy services 2x/week for 8-12 weeks from 4/19/2018 to 7/19/19.

## 2018-06-21 ENCOUNTER — OFFICE VISIT (OUTPATIENT)
Dept: URGENT CARE | Facility: CLINIC | Age: 73
End: 2018-06-21
Payer: COMMERCIAL

## 2018-06-21 DIAGNOSIS — S93.492D SPRAIN OF ANTERIOR TALOFIBULAR LIGAMENT OF LEFT ANKLE, SUBSEQUENT ENCOUNTER: Primary | ICD-10-CM

## 2018-06-21 PROCEDURE — 99214 OFFICE O/P EST MOD 30 MIN: CPT | Mod: S$GLB,,, | Performed by: PHYSICIAN ASSISTANT

## 2018-06-21 NOTE — LETTER
[facility]  [address], [address]  [place] [address]  Phone: [phone]  Fax: [phone]    Pt Name: Marsha Chun  Injury Date: 03/22/2018   Employee ID:  Date of First Treatment: 06/21/2018   Company: [employer] to [id number]            Appointment Time: 12:45 PM Arrived:  1:00 PM CDT   Appointment Date: [unfilled] Time Out: 2:15 PM CDT   Physician: Vijay Doshi PA-C        Office Treatment: Marsha was seen today for ankle pain.    Diagnoses and all orders for this visit:    Sprain of anterior talofibular ligament of left ankle, subsequent encounter       Patient Instructions: Daily home exercises/warm soaks, PT to be scheduled once authorized, Use splint as directed (Continue Celebrex daily. )    Restrictions: Regular Duty, Sit or stand as needed       Return Appointment: Thursday 6/28/2018 at 2:00 PM

## 2018-06-21 NOTE — LETTER
[facility]  [address], [address]  [facility] [address]  Phone: [phone]  Fax: [phone]    Pt Name: Marsha Chun  Injury Date: 03/22/2018   Employee ID:  Date of First Treatment: 06/21/2018   Company: [employer]            Appointment Time: 12:45 PM Arrived:  1:00 PM CDT   Appointment Date: [unfilled] Time Out: 2:15 PM CDT   Physician: Vijay Doshi PA-C        Office Treatment: Marsha was seen today for ankle pain.    Diagnoses and all orders for this visit:    Sprain of anterior talofibular ligament of left ankle, subsequent encounter       Patient Instructions: Daily home exercises/warm soaks, PT to be scheduled once authorized, Use splint as directed (Continue Celebrex daily. )    Restrictions: Regular Duty, Sit or stand as needed       Return Appointment: Thursday 6/28/2018 at 2:00 PM.

## 2018-06-21 NOTE — PROGRESS NOTES
Subjective:       Patient ID: Marsha Chun is a 73 y.o. female.    Chief Complaint: Ankle Pain (Left)    F/u for left ankle (doi 3/22/18) Pt returns to clinic today c/o left ankle pain. Her ankle had gotten better. Since she's been back at work, prolonged standing and walking has caused her ankle to start hurting again. Pt has been icing, elevating, and applying voltaren cream to her ankle with mild relief. ajd     PT HAS BEEN FOLLOWING DR. MONTANEZ FOR HAND FX. SHE REPORTS NOT BEING AS ACTIVE OVER THE PAST FEW MONTHS AS SHE WAS NOT WORKING. PROLONGED WALKING AND STANDING AT WORK HAS CAUSED INCREASED ANKLE PAIN. MB      Ankle Pain    The incident occurred more than 1 week ago. The incident occurred at work. The injury mechanism was a fall. The pain is present in the left ankle. The quality of the pain is described as aching. The pain is moderate. The pain has been intermittent since onset. Pertinent negatives include no numbness. The symptoms are aggravated by weight bearing. She has tried elevation and ice for the symptoms.     Review of Systems   Constitution: Negative for chills, fever and weakness.   HENT: Negative for congestion, ear pain, hearing loss and nosebleeds.    Eyes: Negative for blurred vision, pain and redness.   Cardiovascular: Negative for chest pain, palpitations and syncope.   Respiratory: Negative for cough, shortness of breath and wheezing.    Hematologic/Lymphatic: Negative for bleeding problem.   Skin: Negative for color change, dry skin, itching and rash.   Musculoskeletal: Positive for joint pain and joint swelling. Negative for arthritis, back pain, gout, muscle weakness, neck pain and stiffness.   Gastrointestinal: Negative for abdominal pain, constipation, diarrhea, nausea and vomiting.   Genitourinary: Negative for dysuria, frequency and hematuria.   Neurological: Negative for dizziness, headaches, numbness, paresthesias and seizures.   Psychiatric/Behavioral: The patient is not  nervous/anxious.    Allergic/Immunologic: Negative for hives.   All other systems reviewed and are negative.      Objective:      Physical Exam   Constitutional: She appears well-developed and well-nourished. She is active. No distress.   HENT:   Head: Normocephalic and atraumatic.   Right Ear: Hearing and external ear normal.   Left Ear: Hearing and external ear normal.   Nose: Nose normal. No nasal deformity. No epistaxis.   Mouth/Throat: Oropharynx is clear and moist and mucous membranes are normal.   Eyes: Conjunctivae and lids are normal. No scleral icterus.   Neck: Trachea normal and normal range of motion.   Cardiovascular: Intact distal pulses and normal pulses.    Pulmonary/Chest: Effort normal. No stridor. No respiratory distress.   Musculoskeletal:        Left ankle: She exhibits normal range of motion, no swelling, no ecchymosis and normal pulse. Tenderness. AITFL tenderness found. Achilles tendon normal.   Left ankle anterior drawer negative.    Neurological: She is alert. She has normal strength. She is not disoriented. No sensory deficit. GCS eye subscore is 4. GCS verbal subscore is 5. GCS motor subscore is 6.   Skin: Skin is warm, dry and intact. Capillary refill takes less than 2 seconds. She is not diaphoretic.   Psychiatric: She has a normal mood and affect. Her speech is normal and behavior is normal. She is attentive.   Nursing note reviewed.      Assessment:       1. Sprain of anterior talofibular ligament of left ankle, subsequent encounter        Plan:            Patient Instructions: Daily home exercises/warm soaks, PT to be scheduled once authorized, Use splint as directed (Continue Celebrex daily. )   Restrictions: Regular Duty, Sit or stand as needed  Follow-up in about 1 week (around 6/28/2018).

## 2018-06-27 ENCOUNTER — OFFICE VISIT (OUTPATIENT)
Dept: ORTHOPEDICS | Facility: CLINIC | Age: 73
End: 2018-06-27
Attending: ORTHOPAEDIC SURGERY
Payer: COMMERCIAL

## 2018-06-27 VITALS — HEIGHT: 65 IN | BODY MASS INDEX: 28.99 KG/M2 | WEIGHT: 174 LBS

## 2018-06-27 DIAGNOSIS — S62.346D CLOSED NONDISPLACED FRACTURE OF BASE OF FIFTH METACARPAL BONE OF RIGHT HAND WITH ROUTINE HEALING: Primary | ICD-10-CM

## 2018-06-27 PROCEDURE — 99213 OFFICE O/P EST LOW 20 MIN: CPT | Mod: S$GLB,,, | Performed by: ORTHOPAEDIC SURGERY

## 2018-06-27 PROCEDURE — 99999 PR PBB SHADOW E&M-EST. PATIENT-LVL III: CPT | Mod: PBBFAC,,, | Performed by: ORTHOPAEDIC SURGERY

## 2018-06-27 RX ORDER — CELECOXIB 200 MG/1
200 CAPSULE ORAL DAILY
Qty: 30 CAPSULE | Refills: 3 | Status: SHIPPED | OUTPATIENT
Start: 2018-06-27 | End: 2018-07-25 | Stop reason: SDUPTHER

## 2018-06-27 RX ORDER — DICLOFENAC SODIUM 10 MG/G
2 GEL TOPICAL 3 TIMES DAILY
Qty: 1 TUBE | Refills: 3 | Status: SHIPPED | OUTPATIENT
Start: 2018-06-27 | End: 2018-08-22

## 2018-06-27 NOTE — PROGRESS NOTES
HISTORY OF PRESENT ILLNESS:  Ms. Chun in followup of fifth metacarpal base   fracture of the right hand.  She has finished up therapy.  Last x-ray showed the   fracture to be basically healed, but she is still having some pain in the hand.    Most of the pain seems to be centered over the fifth MP joint.    PHYSICAL EXAMINATION:  RIGHT HAND:  The hand looks good.  There is no significant swelling.  There is   no tenderness at the fracture site.  A little bit of mild tenderness over the   fifth MP.  She has full range of motion of the fifth MP.   strength is   slightly decreased.  No instability.  Tendon function is normal.    PLAN:  I have prescribed some Voltaren gel for topical use on the hand,   particularly over the fifth MP joint where she is having some pain.  Continue   Celebrex, which she is taking by mouth and strengthening exercises at home.    Continue regular duty work.  Follow up in 1 month.      RJF/IN  dd: 06/27/2018 13:43:14 (CDT)  td: 06/28/2018 05:29:16 (CDCLAYTON)  Doc ID   #[id number]  Job ID #[id number]    CC:

## 2018-07-25 ENCOUNTER — OFFICE VISIT (OUTPATIENT)
Dept: ORTHOPEDICS | Facility: CLINIC | Age: 73
End: 2018-07-25
Attending: ORTHOPAEDIC SURGERY
Payer: COMMERCIAL

## 2018-07-25 VITALS — BODY MASS INDEX: 28.99 KG/M2 | HEIGHT: 65 IN | WEIGHT: 174 LBS

## 2018-07-25 DIAGNOSIS — S62.346D CLOSED NONDISPLACED FRACTURE OF BASE OF FIFTH METACARPAL BONE OF RIGHT HAND WITH ROUTINE HEALING: Primary | ICD-10-CM

## 2018-07-25 PROCEDURE — 99213 OFFICE O/P EST LOW 20 MIN: CPT | Mod: S$GLB,,, | Performed by: ORTHOPAEDIC SURGERY

## 2018-07-25 PROCEDURE — 99999 PR PBB SHADOW E&M-EST. PATIENT-LVL II: CPT | Mod: PBBFAC,,, | Performed by: ORTHOPAEDIC SURGERY

## 2018-07-25 RX ORDER — CELECOXIB 200 MG/1
200 CAPSULE ORAL DAILY
Qty: 30 CAPSULE | Refills: 3 | Status: SHIPPED | OUTPATIENT
Start: 2018-07-25 | End: 2019-01-21

## 2018-07-25 NOTE — PROGRESS NOTES
HISTORY OF PRESENT ILLNESS:  Ms. Chun in followup of right fifth metacarpal   base fracture.  She is currently doing exercises at home.  She has returned to   work, but she is having some ongoing symptoms in the right hand.  Previously, we   noted a fair amount of arthritis at the fifth MP joint and I think that seems   to be the source of her pain currently.    PHYSICAL EXAMINATION:  A little bit of tenderness over the fifth MP, no   significant swelling.  Range of motion slightly decreased.   strength   decreased.  Sensation intact.    PLAN:  I do want her to continue with exercises for the hand and wrist.  The   Celebrex seems to be helping, we will continue with that.  The Voltaren gel is   not helping, so we will discontinue that.  Continue regular activities.  Follow   up in 1-2 months.      SALAS  dd: 07/25/2018 13:28:21 (CDT)  td: 07/25/2018 18:09:46 (CDT)  Doc ID   #[id number]  Job ID #[id number]    CC:

## 2018-08-02 ENCOUNTER — OFFICE VISIT (OUTPATIENT)
Dept: URGENT CARE | Facility: CLINIC | Age: 73
End: 2018-08-02
Payer: COMMERCIAL

## 2018-08-02 DIAGNOSIS — S62.346D CLOSED NONDISPLACED FRACTURE OF BASE OF FIFTH METACARPAL BONE OF RIGHT HAND WITH ROUTINE HEALING: ICD-10-CM

## 2018-08-02 DIAGNOSIS — S93.492D SPRAIN OF ANTERIOR TALOFIBULAR LIGAMENT OF LEFT ANKLE, SUBSEQUENT ENCOUNTER: Primary | ICD-10-CM

## 2018-08-02 PROCEDURE — 99214 OFFICE O/P EST MOD 30 MIN: CPT | Mod: S$GLB,,, | Performed by: PHYSICIAN ASSISTANT

## 2018-08-02 NOTE — PROGRESS NOTES
Subjective:       Patient ID: Marsha Chun is a 73 y.o. female.    Chief Complaint: Ankle Pain (Left)      PT PRESENTS FOR F/U LEFT ANKLE SPRAIN. SHE WAS SUPPOSED TO RTC 6/28/2018, BUT DID NOT RECALL HAVING THAT APPT. SHE TRIED COMING LAST WEEK, BUT DECLINED TO SEE ANOTHER PROVIDER AS SHE WANTED TO SEE ME. SHE WAS ALSO SUPPOSED TO HAVE PHYSICAL THERAPY FOR HER ANKLE PER MY NOTE 6/21/2018, BUT IT WAS NOT REQUESTED BY MA.   PT REPORTS SIGNIFICANT LEFT ANKLE PAIN, WORSE WITH PROLONGED STANDING. SHE ALSO STATES HER RLE ACHES DUE TO FAVORING THAT SIDE. SHE IS WEARING A LACE-UP ANKLE BRACE FOR SUPPORT, BUT UNSURE IF IT HELPS. SHE IS TAKING CELEBREX. MB    Ankle Pain    The incident occurred more than 1 week ago. The incident occurred at work. The injury mechanism was a fall. The pain is present in the left ankle. The quality of the pain is described as aching. The pain is moderate. Pertinent negatives include no inability to bear weight, loss of motion, loss of sensation, numbness or tingling. The symptoms are aggravated by weight bearing and palpation. She has tried elevation and NSAIDs for the symptoms.     Review of Systems   Constitution: Negative for chills and fever.   HENT: Negative for hearing loss and nosebleeds.    Eyes: Negative for blurred vision and redness.   Cardiovascular: Negative for chest pain and syncope.   Respiratory: Negative for cough and shortness of breath.    Hematologic/Lymphatic: Negative for bleeding problem.   Skin: Negative for color change and rash.   Musculoskeletal: Positive for joint pain. Negative for back pain and neck pain.   Gastrointestinal: Negative for abdominal pain.   Neurological: Negative for numbness, paresthesias and tingling.   Psychiatric/Behavioral: The patient is not nervous/anxious.        Objective:      Physical Exam   Constitutional: She appears well-developed and well-nourished. She is active. No distress.   HENT:   Head: Normocephalic and atraumatic.    Right Ear: Hearing and external ear normal.   Left Ear: Hearing and external ear normal.   Nose: Nose normal. No nasal deformity. No epistaxis.   Mouth/Throat: Oropharynx is clear and moist and mucous membranes are normal.   Eyes: Conjunctivae and lids are normal. No scleral icterus.   Neck: Trachea normal and normal range of motion.   Cardiovascular: Intact distal pulses and normal pulses.    Pulmonary/Chest: Effort normal. No stridor. No respiratory distress.   Musculoskeletal:        Left ankle: She exhibits normal range of motion, no swelling, no ecchymosis and no deformity. Tenderness. AITFL tenderness found. Achilles tendon normal.   Neurological: She is alert. She has normal strength. She is not disoriented. No sensory deficit. GCS eye subscore is 4. GCS verbal subscore is 5. GCS motor subscore is 6.   Skin: Skin is warm, dry and intact. Capillary refill takes less than 2 seconds. She is not diaphoretic.   Psychiatric: She has a normal mood and affect. Her speech is normal and behavior is normal. She is attentive.   Nursing note reviewed.      Assessment:       1. Sprain of anterior talofibular ligament of left ankle, subsequent encounter    2. Closed nondisplaced fracture of base of fifth metacarpal bone of right hand with routine healing        Plan:       Follows Dr. Ojeda for hand fx.        Patient Instructions: PT to be scheduled once authorized, Daily home exercises/warm soaks (Continue celebrex daily. )   Restrictions: Sit or stand as needed, Regular Duty  Follow-up in about 2 weeks (around 8/16/2018).      >4 MONTHS POST INJURY. ANKLE SHOULD HAVE HEALED BY NOW. I WILL GET MRI IF NO SIGNIFICANT IMPROVEMENT WITH PT.           Patient Instructions       Understanding Ankle Sprain    The ankle is the joint where the leg and foot meet. Bones are held in place by connective tissue called ligaments. When ankle ligaments are stretched to the point of pain and injury, it is called an ankle sprain. A sprain  can tear the ligaments. These tears can be very small but still cause pain. Ankle sprains can be mild or severe.  What causes an ankle sprain?  A sprain may occur when you twist your ankle or bend it too far. This can happen when you stumble or fall. Things that can make an ankle sprain more likely include:  · Having had an ankle sprain before  · Playing sports that involve running and jumping. Or playing contact sports such as football or hockey.  · Wearing shoes that dont support your feet and ankles well  · Having ankles with poor strength and flexibility  Symptoms of an ankle sprain  Symptoms may include:  · Pain or soreness in the ankle  · Swelling  · Redness or bruising  · Not being able to walk or put weight on the affected foot  · Reduced range of motion in the ankle  · A popping or tearing feeling at the time the sprain occurs  · An abnormal or dislocated look to the ankle  · Instability or too much range of motion in the ankle  Treatment for an ankle sprain  Treatment focuses on reducing pain and swelling, and avoiding further injury. Treatments may include:  · Resting the ankle. Avoid putting weight on it. This may mean using crutches until the sprain heals.  · Prescription or over-the-counter pain medicines. These help reduce swelling and pain.  · Cold packs. These help reduce pain and swelling.  · Raising your ankle above your heart. This helps reduce swelling.  · Wrapping the ankle with an elastic bandage or ankle brace. This helps reduce swelling and gives some support to the ankle. In rare cases, you may need a cast or boot.  · Stretching and other exercises. These improve flexibility and strength.  · Heat packs. These may be recommended before doing ankle exercises.  Possible complications of an ankle sprain  An ankle that has been weakened by a sprain can be more likely to have repeated sprains afterward. Doing exercises to strengthen your ankle and improve balance can reduce your risk for  repeated sprains. Other possible complications are long-term (chronic) pain or an ankle that remains unstable.  When to call your healthcare provider  Call your healthcare provider right away if you have any of these:  · Fever of 100.4°F (38°C) or higher, or as directed  · Pain, numbness, discoloration, or coldness in the foot or toes  · Pain that gets worse  · Symptoms that dont get better, or get worse  · New symptoms   Date Last Reviewed: 3/10/2016  © 5855-6206 [employer]. [address] [place] [address]. All rights reserved. This information is not intended as a substitute for professional medical care. Always follow your healthcare professional's instructions.        Treating Ankle Sprains  Treatment will depend on how bad your sprain is. For a severe sprain, healing may take 3 months or more.  Right after your injury: Use R.I.C.E.  · Rest: At first, keep weight off the ankle as much as you can. You may be given crutches to help you walk without putting weight on the ankle.  · Ice: Put an ice pack on the ankle for 15 minutes. Remove the pack and wait at least 30 minutes. Repeat for up to 3 days. This helps reduce swelling.  · Compression: To reduce swelling and keep the joint stable, you may need to wrap the ankle with an elastic bandage. For more severe sprains, you may need an ankle brace or a cast.  · Elevation: To reduce swelling, keep your ankle raised above your heart when you sit or lie down.  Medicine  Your healthcare provider may suggest oral non-steroidal anti-inflammatory medicine (NSAIDs), such as ibuprofen. This relieves the pain and helps reduce any swelling. Be sure to take your medicine as directed.  Contrast baths  After 3 days, soak your ankle in warm water for 30 seconds, then in cool water for 30 seconds. Go back and forth for 5 minutes. Doing this every 2 hours will help keep the swelling down.  Exercises    After about 2 to 3 weeks, you may be given exercises to  strengthen the ligaments and muscles in the ankle. Doing these exercises will help prevent another ankle sprain. Exercises may include standing on your toes and then on your heels and doing ankle curls.  Ankle curls  · Sit on the edge of a sturdy table or lie on your back.  · Pull your toes toward you. Then point them away from you. Repeat for 2 to 3 minutes.   Date Last Reviewed: 9/28/2015  © 8658-2068 [employer]. [address] [place] [address]. All rights reserved. This information is not intended as a substitute for professional medical care. Always follow your healthcare professional's instructions.        Ankle Alphabet (Flexibility)    These instructions are for your right foot. Switch sides for your left foot.  1. Sit on the floor with your legs straight in front of you.  2. Rest your right calf on a rolled-up towel. Use your foot to write the letters of the alphabet in mid-air.  3. Repeat this exercise 3 times a day, or as instructed.  Date Last Reviewed: 3/10/2016  © 6089-9202 [employer]. [address] [place] [address]. All rights reserved. This information is not intended as a substitute for professional medical care. Always follow your healthcare professional's instructions.

## 2018-08-02 NOTE — PATIENT INSTRUCTIONS
Understanding Ankle Sprain    The ankle is the joint where the leg and foot meet. Bones are held in place by connective tissue called ligaments. When ankle ligaments are stretched to the point of pain and injury, it is called an ankle sprain. A sprain can tear the ligaments. These tears can be very small but still cause pain. Ankle sprains can be mild or severe.  What causes an ankle sprain?  A sprain may occur when you twist your ankle or bend it too far. This can happen when you stumble or fall. Things that can make an ankle sprain more likely include:  · Having had an ankle sprain before  · Playing sports that involve running and jumping. Or playing contact sports such as football or hockey.  · Wearing shoes that dont support your feet and ankles well  · Having ankles with poor strength and flexibility  Symptoms of an ankle sprain  Symptoms may include:  · Pain or soreness in the ankle  · Swelling  · Redness or bruising  · Not being able to walk or put weight on the affected foot  · Reduced range of motion in the ankle  · A popping or tearing feeling at the time the sprain occurs  · An abnormal or dislocated look to the ankle  · Instability or too much range of motion in the ankle  Treatment for an ankle sprain  Treatment focuses on reducing pain and swelling, and avoiding further injury. Treatments may include:  · Resting the ankle. Avoid putting weight on it. This may mean using crutches until the sprain heals.  · Prescription or over-the-counter pain medicines. These help reduce swelling and pain.  · Cold packs. These help reduce pain and swelling.  · Raising your ankle above your heart. This helps reduce swelling.  · Wrapping the ankle with an elastic bandage or ankle brace. This helps reduce swelling and gives some support to the ankle. In rare cases, you may need a cast or boot.  · Stretching and other exercises. These improve flexibility and strength.  · Heat packs. These may be recommended before doing  ankle exercises.  Possible complications of an ankle sprain  An ankle that has been weakened by a sprain can be more likely to have repeated sprains afterward. Doing exercises to strengthen your ankle and improve balance can reduce your risk for repeated sprains. Other possible complications are long-term (chronic) pain or an ankle that remains unstable.  When to call your healthcare provider  Call your healthcare provider right away if you have any of these:  · Fever of 100.4°F (38°C) or higher, or as directed  · Pain, numbness, discoloration, or coldness in the foot or toes  · Pain that gets worse  · Symptoms that dont get better, or get worse  · New symptoms   Date Last Reviewed: 3/10/2016  © 2696-5742 [employer]. [address] [place] [address], [place] [address]. All rights reserved. This information is not intended as a substitute for professional medical care. Always follow your healthcare professional's instructions.        Treating Ankle Sprains  Treatment will depend on how bad your sprain is. For a severe sprain, healing may take 3 months or more.  Right after your injury: Use R.I.C.E.  · Rest: At first, keep weight off the ankle as much as you can. You may be given crutches to help you walk without putting weight on the ankle.  · Ice: Put an ice pack on the ankle for 15 minutes. Remove the pack and wait at least 30 minutes. Repeat for up to 3 days. This helps reduce swelling.  · Compression: To reduce swelling and keep the joint stable, you may need to wrap the ankle with an elastic bandage. For more severe sprains, you may need an ankle brace or a cast.  · Elevation: To reduce swelling, keep your ankle raised above your heart when you sit or lie down.  Medicine  Your healthcare provider may suggest oral non-steroidal anti-inflammatory medicine (NSAIDs), such as ibuprofen. This relieves the pain and helps reduce any swelling. Be sure to take your medicine as directed.  Contrast baths  After 3  days, soak your ankle in warm water for 30 seconds, then in cool water for 30 seconds. Go back and forth for 5 minutes. Doing this every 2 hours will help keep the swelling down.  Exercises    After about 2 to 3 weeks, you may be given exercises to strengthen the ligaments and muscles in the ankle. Doing these exercises will help prevent another ankle sprain. Exercises may include standing on your toes and then on your heels and doing ankle curls.  Ankle curls  · Sit on the edge of a sturdy table or lie on your back.  · Pull your toes toward you. Then point them away from you. Repeat for 2 to 3 minutes.   Date Last Reviewed: 9/28/2015  © 1210-1586 [employer]. [address] [place] [address]. All rights reserved. This information is not intended as a substitute for professional medical care. Always follow your healthcare professional's instructions.        Ankle Alphabet (Flexibility)    These instructions are for your right foot. Switch sides for your left foot.  1. Sit on the floor with your legs straight in front of you.  2. Rest your right calf on a rolled-up towel. Use your foot to write the letters of the alphabet in mid-air.  3. Repeat this exercise 3 times a day, or as instructed.  Date Last Reviewed: 3/10/2016  © 9842-5182 [employer]. [address] [place] [address]. All rights reserved. This information is not intended as a substitute for professional medical care. Always follow your healthcare professional's instructions.

## 2018-08-22 ENCOUNTER — OFFICE VISIT (OUTPATIENT)
Dept: ORTHOPEDICS | Facility: CLINIC | Age: 73
End: 2018-08-22
Attending: ORTHOPAEDIC SURGERY
Payer: COMMERCIAL

## 2018-08-22 VITALS — BODY MASS INDEX: 28.99 KG/M2 | WEIGHT: 174 LBS | HEIGHT: 65 IN

## 2018-08-22 DIAGNOSIS — S62.346D CLOSED NONDISPLACED FRACTURE OF BASE OF FIFTH METACARPAL BONE OF RIGHT HAND WITH ROUTINE HEALING: Primary | ICD-10-CM

## 2018-08-22 PROCEDURE — 99213 OFFICE O/P EST LOW 20 MIN: CPT | Mod: S$GLB,,, | Performed by: ORTHOPAEDIC SURGERY

## 2018-08-22 PROCEDURE — 99999 PR PBB SHADOW E&M-EST. PATIENT-LVL III: CPT | Mod: PBBFAC,,, | Performed by: ORTHOPAEDIC SURGERY

## 2018-08-22 RX ORDER — CELECOXIB 200 MG/1
200 CAPSULE ORAL DAILY
Qty: 30 CAPSULE | Refills: 3 | Status: SHIPPED | OUTPATIENT
Start: 2018-08-22 | End: 2019-01-21

## 2018-08-22 RX ORDER — DICLOFENAC SODIUM 10 MG/G
2 GEL TOPICAL 3 TIMES DAILY
Qty: 1 TUBE | Refills: 3 | Status: SHIPPED | OUTPATIENT
Start: 2018-08-22 | End: 2018-09-10 | Stop reason: SDUPTHER

## 2018-08-22 NOTE — PROGRESS NOTES
HISTORY OF PRESENT ILLNESS:  Ms. Chun is in followup of right fifth metacarpal   base fracture, still having some arthritic symptoms in the right hand.    Previously, we had ordered some Voltaren gel, but I do not think she ever got   it.  She would like to try that now.    PHYSICAL EXAMINATION:  She does have some small enlargement of the small joints,   particularly the DIP joints of the fingers, almost full range of motion.     strength is slightly decreased.  Tendon function normal.    Sensation intact.  Tinel sign negative.    PLAN:  I will reorder the Voltaren gel and have her use that for the fingers.    We did discuss an injection today, but she deferred on that and the Celebrex is   helping, so we will also refill the Celebrex.  Follow up as needed.      SALAS  dd: 08/22/2018 13:56:45 (CDT)  td: 08/22/2018 20:18:16 (CDT)  Doc ID   #[id number]  Job ID #[id number]    CC:

## 2018-08-23 ENCOUNTER — OFFICE VISIT (OUTPATIENT)
Dept: URGENT CARE | Facility: CLINIC | Age: 73
End: 2018-08-23
Payer: COMMERCIAL

## 2018-08-23 DIAGNOSIS — S93.492D SPRAIN OF ANTERIOR TALOFIBULAR LIGAMENT OF LEFT ANKLE, SUBSEQUENT ENCOUNTER: Primary | ICD-10-CM

## 2018-08-23 PROCEDURE — 99214 OFFICE O/P EST MOD 30 MIN: CPT | Mod: S$GLB,,, | Performed by: PHYSICIAN ASSISTANT

## 2018-08-23 NOTE — LETTER
[facility]  [address], [address]  [facility] [address]  Phone: [phone]  Fax: [phone]    Pt Name: Marsha Chun  Injury Date: 03/22/2018   Employee ID: [id number] Date ofTreatment: 08/23/2018   Company: [employer]            Appointment Time: 02:15 PM Arrived:  2:30 PM CDT   Physician: Vijay Doshi PA-C        Office Treatment: Marsha was seen today for ankle pain.    Diagnoses and all orders for this visit:    Sprain of anterior talofibular ligament of left ankle, subsequent encounter       Patient Instructions: MRI to be scheduled once authorized, Daily home exercises/warm soaks    Restrictions: None                Regular Duty       Return Appointment: 9/6/2018 at 2:00PM

## 2018-08-23 NOTE — LETTER
[facility]  [address], [address]  [facility] [address]  Phone: [phone]  Fax: [phone]    Pt Name: Marsha Chun  Injury Date: 03/22/2018   Employee ID: [id number] Date of First Treatment: 08/23/2018   Company: [employer]            Appointment Time: 02:15 PM Arrived:  2:30 PM CDT   Appointment Date: [unfilled] Time Out:***   Physician: Vijay Doshi PA-C        Office Treatment: Marsha was seen today for ankle pain.    Diagnoses and all orders for this visit:    Sprain of anterior talofibular ligament of left ankle, subsequent encounter       Patient Instructions: MRI to be scheduled once authorized, Daily home exercises/warm soaks    Restrictions: Regular Duty       Return Appointment: Visit date not found at ***

## 2018-08-23 NOTE — PROGRESS NOTES
Subjective:       Patient ID: Marsha Chun is a 73 y.o. female.    Chief Complaint: Ankle Pain (left)    F/U FOR LEFT ANKLE PAIN (doi 3/22/2018) Patient reports a pain level of 4/10 today on her left ankle. She says she wears a lace up ankle brace, which helps her pain. When she does not use the brace, she has pain that shoots up to her left knee. She reports right leg pain due to overcompensation. She also states that she is taking Celebrex for pain which helps. She reports tingling in her left toes. She is doing home exercises, which she says is helping a little. LM    Pt reports persistent pain in left ankle, worse with ambulation. Pt states she has not been contacted to start physical therapy. She continues to do home exercises, warm soaks and elevation off duty. She is taking celebrex daily and was given diclofenac cream for her ankle by Dr. Ojeda. She follows Dr. Ojeda for hand fx, which she states is much improved and doesn't cause significant pain or limitation. MB      Review of Systems   Constitution: Negative for chills, fever, weakness and malaise/fatigue.   HENT: Negative for hearing loss and nosebleeds.    Eyes: Negative for blurred vision and redness.   Cardiovascular: Negative for chest pain and syncope.   Respiratory: Negative for cough and shortness of breath.    Hematologic/Lymphatic: Negative for bleeding problem.   Skin: Negative for color change and rash.   Musculoskeletal: Positive for joint pain. Negative for back pain, joint swelling, muscle cramps and neck pain.   Gastrointestinal: Negative for abdominal pain.   Genitourinary: Negative for hematuria.   Neurological: Negative for dizziness, numbness and paresthesias.   Psychiatric/Behavioral: The patient is not nervous/anxious.    All other systems reviewed and are negative.      Objective:      Physical Exam   Constitutional: She appears well-developed and well-nourished. She is active. No distress.   HENT:   Head: Normocephalic  and atraumatic.   Right Ear: Hearing and external ear normal.   Left Ear: Hearing and external ear normal.   Nose: Nose normal. No nasal deformity. No epistaxis.   Mouth/Throat: Oropharynx is clear and moist and mucous membranes are normal.   Eyes: Conjunctivae and lids are normal. No scleral icterus.   Neck: Trachea normal and normal range of motion.   Cardiovascular: Intact distal pulses and normal pulses.   Pulmonary/Chest: Effort normal. No stridor. No respiratory distress.   Musculoskeletal:        Left ankle: She exhibits normal range of motion, no swelling, no ecchymosis, no deformity and normal pulse. Tenderness. AITFL (Severe focal TTP) tenderness found. No lateral malleolus, no medial malleolus and no proximal fibula tenderness found. Achilles tendon normal.   Neurological: She is alert. She has normal strength. She is not disoriented. No sensory deficit. GCS eye subscore is 4. GCS verbal subscore is 5. GCS motor subscore is 6.   Skin: Skin is warm, dry and intact. Capillary refill takes less than 2 seconds. She is not diaphoretic.   Psychiatric: She has a normal mood and affect. Her speech is normal and behavior is normal. She is attentive.   Nursing note reviewed.      Assessment:       1. Sprain of anterior talofibular ligament of left ankle, subsequent encounter        Plan:            Patient Instructions: MRI to be scheduled once authorized, Daily home exercises/warm soaks   Restrictions: Regular Duty  Follow-up in about 2 weeks (around 9/6/2018).

## 2018-09-04 ENCOUNTER — TELEPHONE (OUTPATIENT)
Dept: FAMILY MEDICINE | Facility: CLINIC | Age: 73
End: 2018-09-04

## 2018-09-04 NOTE — TELEPHONE ENCOUNTER
----- Message from David Marinelli sent at 9/4/2018 11:22 AM CDT -----  Contact: Self/[phone]  The patient is requesting to speak to Anali Quigley in regards to medication.    Refill:atenolol-chlorthalidone (TENORETIC) 50-25 mg Tab    [facility] [address] [place] [address] [phone] (Phone)  [phone] (Fax)    Thank you

## 2018-09-04 NOTE — TELEPHONE ENCOUNTER
Spoke with patient and she was requesting refill on medication listed. Informed her that Anali Quigley is no longer with [facility] and she will need to establish care with new provider for med refill.  Patient has scheduled appointment for 9/10/18. Appointment slip in the mail.

## 2018-09-04 NOTE — TELEPHONE ENCOUNTER
----- Message from David Marinelli sent at 9/4/2018 11:22 AM CDT -----  Contact: Self/[phone]  The patient is requesting to speak to Anali Quigley in regards to medication.    Refill:atenolol-chlorthalidone (TENORETIC) 50-25 mg Tab    [facility] [address] [place] [address] [phone] (Phone)  [phone] (Fax)    Thank you

## 2018-09-10 ENCOUNTER — OFFICE VISIT (OUTPATIENT)
Dept: FAMILY MEDICINE | Facility: CLINIC | Age: 73
End: 2018-09-10
Payer: COMMERCIAL

## 2018-09-10 ENCOUNTER — LAB VISIT (OUTPATIENT)
Dept: LAB | Facility: HOSPITAL | Age: 73
End: 2018-09-10
Attending: INTERNAL MEDICINE
Payer: COMMERCIAL

## 2018-09-10 VITALS
OXYGEN SATURATION: 98 % | SYSTOLIC BLOOD PRESSURE: 118 MMHG | HEIGHT: 65 IN | WEIGHT: 186.31 LBS | HEART RATE: 66 BPM | DIASTOLIC BLOOD PRESSURE: 80 MMHG | TEMPERATURE: 98 F | BODY MASS INDEX: 31.04 KG/M2 | RESPIRATION RATE: 17 BRPM

## 2018-09-10 DIAGNOSIS — Z11.3 SCREEN FOR STD (SEXUALLY TRANSMITTED DISEASE): ICD-10-CM

## 2018-09-10 DIAGNOSIS — Z00.00 ANNUAL PHYSICAL EXAM: ICD-10-CM

## 2018-09-10 DIAGNOSIS — S63.502D SPRAIN OF LEFT WRIST, SUBSEQUENT ENCOUNTER: ICD-10-CM

## 2018-09-10 DIAGNOSIS — Z00.00 ANNUAL PHYSICAL EXAM: Primary | ICD-10-CM

## 2018-09-10 DIAGNOSIS — Z12.39 BREAST CANCER SCREENING: ICD-10-CM

## 2018-09-10 DIAGNOSIS — Z86.73 HISTORY OF TRANSIENT ISCHEMIC ATTACK (TIA): ICD-10-CM

## 2018-09-10 DIAGNOSIS — I10 ESSENTIAL HYPERTENSION: ICD-10-CM

## 2018-09-10 PROBLEM — N32.81 OAB (OVERACTIVE BLADDER): Status: ACTIVE | Noted: 2018-09-10

## 2018-09-10 PROCEDURE — 93005 ELECTROCARDIOGRAM TRACING: CPT | Mod: S$GLB,,, | Performed by: INTERNAL MEDICINE

## 2018-09-10 PROCEDURE — 99213 OFFICE O/P EST LOW 20 MIN: CPT | Mod: S$GLB,,, | Performed by: INTERNAL MEDICINE

## 2018-09-10 PROCEDURE — 80074 ACUTE HEPATITIS PANEL: CPT

## 2018-09-10 PROCEDURE — 3079F DIAST BP 80-89 MM HG: CPT | Mod: CPTII,S$GLB,, | Performed by: INTERNAL MEDICINE

## 2018-09-10 PROCEDURE — 1101F PT FALLS ASSESS-DOCD LE1/YR: CPT | Mod: CPTII,S$GLB,, | Performed by: INTERNAL MEDICINE

## 2018-09-10 PROCEDURE — 99999 PR PBB SHADOW E&M-EST. PATIENT-LVL III: CPT | Mod: PBBFAC,,, | Performed by: INTERNAL MEDICINE

## 2018-09-10 PROCEDURE — 86592 SYPHILIS TEST NON-TREP QUAL: CPT

## 2018-09-10 PROCEDURE — 36415 COLL VENOUS BLD VENIPUNCTURE: CPT | Mod: PN

## 2018-09-10 PROCEDURE — 83036 HEMOGLOBIN GLYCOSYLATED A1C: CPT

## 2018-09-10 PROCEDURE — 93010 ELECTROCARDIOGRAM REPORT: CPT | Mod: S$GLB,,, | Performed by: INTERNAL MEDICINE

## 2018-09-10 PROCEDURE — 3074F SYST BP LT 130 MM HG: CPT | Mod: CPTII,S$GLB,, | Performed by: INTERNAL MEDICINE

## 2018-09-10 PROCEDURE — 86703 HIV-1/HIV-2 1 RESULT ANTBDY: CPT

## 2018-09-10 RX ORDER — OXYBUTYNIN CHLORIDE 5 MG/1
5 TABLET ORAL 2 TIMES DAILY
Qty: 360 TABLET | Refills: 0 | Status: CANCELLED | OUTPATIENT
Start: 2018-09-10 | End: 2019-09-10

## 2018-09-10 RX ORDER — ATENOLOL AND CHLORTHALIDONE TABLET 50; 25 MG/1; MG/1
1 TABLET ORAL DAILY
Qty: 90 TABLET | Refills: 1 | Status: SHIPPED | OUTPATIENT
Start: 2018-09-10 | End: 2018-09-10 | Stop reason: SDUPTHER

## 2018-09-10 RX ORDER — ATENOLOL AND CHLORTHALIDONE TABLET 50; 25 MG/1; MG/1
1 TABLET ORAL DAILY
Qty: 30 TABLET | Refills: 3 | Status: SHIPPED | OUTPATIENT
Start: 2018-09-10 | End: 2019-01-21 | Stop reason: SDUPTHER

## 2018-09-10 RX ORDER — CELECOXIB 200 MG/1
200 CAPSULE ORAL DAILY
Qty: 30 CAPSULE | Refills: 3 | Status: CANCELLED | OUTPATIENT
Start: 2018-09-10

## 2018-09-10 RX ORDER — ACETAMINOPHEN 500 MG
5000 TABLET ORAL DAILY
Status: CANCELLED | COMMUNITY
Start: 2018-09-10

## 2018-09-10 RX ORDER — DICLOFENAC SODIUM 10 MG/G
2 GEL TOPICAL 3 TIMES DAILY
Qty: 1 TUBE | Refills: 0 | Status: SHIPPED | OUTPATIENT
Start: 2018-09-10 | End: 2018-09-18 | Stop reason: SDUPTHER

## 2018-09-10 NOTE — PROGRESS NOTES
HISTORY OF PRESENT ILLNESS:  Marsha Chun is a 73 y.o. female who presents to the clinic today for Establish Care and Medication Refill    LOV Anali Quigley 1/15/18.    Had a fall in March 2018.  Following with PT and hand clinic since then.  Progressing relatively well.    Reports indigestion yesterday while she was at [employer] game.  Had Sprite and improved.    PAST MEDICAL HISTORY:  Past Medical History:   Diagnosis Date    History of TB skin testing     Hyperparathyroidism, unspecified     Hypertension     Osteopenia     Overweight(278.02)        PAST SURGICAL HISTORY:  Past Surgical History:   Procedure Laterality Date    lasik and a rotator cuff surgery      SHOULDER SURGERY         SOCIAL HISTORY:  Social History     Socioeconomic History    Marital status:      Spouse name: Not on file    Number of children: Not on file    Years of education: Not on file    Highest education level: Not on file   Social Needs    Financial resource strain: Not on file    Food insecurity - worry: Not on file    Food insecurity - inability: Not on file    Transportation needs - medical: Not on file    Transportation needs - non-medical: Not on file   Occupational History    Not on file   Tobacco Use    Smoking status: Never Smoker    Smokeless tobacco: Never Used   Substance and Sexual Activity    Alcohol use: No     Alcohol/week: 0.0 oz    Drug use: No    Sexual activity: Not on file   Other Topics Concern    Not on file   Social History Narrative    Not on file       FAMILY HISTORY:  Family History   Problem Relation Age of Onset    Stroke Sister     Diabetes Neg Hx     Thyroid cancer Neg Hx     Osteoporosis Neg Hx        ALLERGIES AND MEDICATIONS: updated and reviewed.  Review of patient's allergies indicates:  No Known Allergies     Medication List           Accurate as of 9/10/18  8:59 AM. If you have any questions, ask your nurse or doctor.               CONTINUE taking these  medications    atenolol-chlorthalidone 50-25 mg Tab  Commonly known as:  TENORETIC  Take 1 tablet by mouth once daily.     * celecoxib 200 MG capsule  Commonly known as:  CeleBREX  Take 1 capsule (200 mg total) by mouth once daily.     * celecoxib 200 MG capsule  Commonly known as:  CeleBREX  Take 1 capsule (200 mg total) by mouth once daily.     diclofenac sodium 1 % Gel  Commonly known as:  VOLTAREN  Apply 2 g topically 3 (three) times daily. for 10 days     fish oil-omega-3 fatty acids 300-1,000 mg capsule     ibuprofen 400 MG tablet  Commonly known as:  ADVIL,MOTRIN  Take 1 tablet (400 mg total) by mouth every 6 (six) hours as needed for Other. Take with food.     lidocaine-prilocaine cream  Commonly known as:  EMLA  Apply topically as needed.     oxybutynin 5 MG Tab  Commonly known as:  DITROPAN  Take 1 tablet (5 mg total) by mouth 2 (two) times daily.     oxyCODONE-acetaminophen 5-325 mg per tablet  Commonly known as:  PERCOCET  Take 1 tablet by mouth every 12 (twelve) hours as needed for Pain.     VITAMIN D3 5,000 unit Tab  Generic drug:  cholecalciferol (vitamin D3)         * This list has 2 medication(s) that are the same as other medications prescribed for you. Read the directions carefully, and ask your doctor or other care provider to review them with you.                   CARE TEAM:  Patient Care Team:  Marcos Sevilla MD as PCP - General (Internal Medicine)         REVIEW OF SYSTEMS:  Review of Systems   Constitutional: Negative for chills and fatigue.   HENT: Negative for congestion and postnasal drip.    Respiratory: Negative for cough and shortness of breath.    Cardiovascular: Negative for chest pain and leg swelling.   Gastrointestinal: Positive for nausea. Negative for abdominal pain and vomiting.   Musculoskeletal: Negative for back pain and gait problem.   Neurological: Negative for syncope and headaches.   Psychiatric/Behavioral: Negative for dysphoric mood. The patient is not  nervous/anxious.    All other systems reviewed and are negative.    PHYSICAL EXAM:   Vitals:    09/10/18 1148   BP: 118/80   Pulse: 66   Resp: 17   Temp: 98.1 °F (36.7 °C)             Body mass index is 31 kg/m².     General appearance - oriented to person, place, and time  Mental status - normal mood, behavior, speech, dress, motor activity, and thought processes  Ears - bilateral TM's and external ear canals normal  Chest - clear to auscultation, no wheezes, rales or rhonchi, symmetric air entry  Heart - normal rate and regular rhythm  Abdomen - soft, nontender, nondistended, no masses or organomegaly  Extremities - no pedal edema noted, intact peripheral pulses      ASSESSMENT AND PLAN:  1. Annual physical exam  - Hemoglobin A1c; Future  - EKG 12-lead    2. Essential hypertension  - EKG 12-lead  - atenolol-chlorthalidone (TENORETIC) 50-25 mg Tab; Take 1 tablet by mouth once daily.  Dispense: 30 tablet; Refill: 3    3. History of transient ischemic attack (TIA)  - Stable    4. Breast cancer screening  - Mammo Digital Screening Bilat with CAD; Future    5. Sprain of left wrist, subsequent encounter  - Continue PT and f/u with Dr. Ojeda  - diclofenac sodium (VOLTAREN) 1 % Gel; Apply 2 g topically 3 (three) times daily. for 10 days  Dispense: 1 Tube; Refill: 0    6. Screen for STD (sexually transmitted disease)  - HIV-1 and HIV-2 antibodies; Future  - RPR; Future  - Hepatitis panel, acute; Future  - C. trachomatis/N. gonorrhoeae by AMP DNA     Follow up 5-6 months or sooner as needed.

## 2018-09-11 LAB
ESTIMATED AVG GLUCOSE: 103 MG/DL
HAV IGM SERPL QL IA: NEGATIVE
HBA1C MFR BLD HPLC: 5.2 %
HBV CORE IGM SERPL QL IA: NEGATIVE
HBV SURFACE AG SERPL QL IA: NEGATIVE
HCV AB SERPL QL IA: NEGATIVE
HIV 1+2 AB+HIV1 P24 AG SERPL QL IA: NEGATIVE
RPR SER QL: NORMAL

## 2018-09-14 ENCOUNTER — HOSPITAL ENCOUNTER (OUTPATIENT)
Dept: RADIOLOGY | Facility: HOSPITAL | Age: 73
Discharge: HOME | End: 2018-09-14
Attending: INTERNAL MEDICINE
Payer: COMMERCIAL

## 2018-09-14 DIAGNOSIS — Z12.39 BREAST CANCER SCREENING: ICD-10-CM

## 2018-09-14 PROCEDURE — 77067 SCR MAMMO BI INCL CAD: CPT | Mod: TC,PO

## 2018-09-14 PROCEDURE — 77067 SCR MAMMO BI INCL CAD: CPT | Mod: 26,,, | Performed by: RADIOLOGY

## 2018-09-14 PROCEDURE — 77063 BREAST TOMOSYNTHESIS BI: CPT | Mod: 26,,, | Performed by: RADIOLOGY

## 2018-09-18 ENCOUNTER — OFFICE VISIT (OUTPATIENT)
Dept: URGENT CARE | Facility: CLINIC | Age: 73
End: 2018-09-18
Payer: COMMERCIAL

## 2018-09-18 DIAGNOSIS — S92.345D CLOSED NONDISPLACED FRACTURE OF FOURTH METATARSAL BONE OF LEFT FOOT WITH ROUTINE HEALING, SUBSEQUENT ENCOUNTER: ICD-10-CM

## 2018-09-18 DIAGNOSIS — S63.502D SPRAIN OF LEFT WRIST, SUBSEQUENT ENCOUNTER: ICD-10-CM

## 2018-09-18 DIAGNOSIS — S93.492D SPRAIN OF ANTERIOR TALOFIBULAR LIGAMENT OF LEFT ANKLE, SUBSEQUENT ENCOUNTER: Primary | ICD-10-CM

## 2018-09-18 DIAGNOSIS — S93.412D SPRAIN OF CALCANEOFIBULAR LIGAMENT OF LEFT ANKLE, SUBSEQUENT ENCOUNTER: ICD-10-CM
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
## 2018-09-18 PROCEDURE — 99214 OFFICE O/P EST MOD 30 MIN: CPT | Mod: S$GLB,,, | Performed by: PHYSICIAN ASSISTANT

## 2018-09-18 RX ORDER — DICLOFENAC SODIUM 10 MG/G
2 GEL TOPICAL 3 TIMES DAILY
Qty: 1 TUBE | Refills: 1 | Status: SHIPPED | OUTPATIENT
Start: 2018-09-18 | End: 2020-04-28 | Stop reason: SDUPTHER

## 2018-09-18 NOTE — LETTER
[facility]  [address], [address]  [facility] [place] [facility] [address]  Phone: [phone]  Fax: [phone]    Pt Name: Marsha Chun  Injury Date: 03/22/2018   Employee ID: [id number] Date: 09/18/2018   Company: [employer]            Appointment Time: 11:30 AM Arrived: 11:30 AM CDT   Physician: Vijay Doshi PA-C Time out: 11:53 AM       Office Treatment: Marsha was seen today for ankle pain.    Diagnoses and all orders for this visit:    Sprain of anterior talofibular ligament of left ankle, subsequent encounter  Sprain of calcaneofibular ligament of left ankle, subsequent encounter  Closed nondisplaced fracture of fourth metatarsal bone of left foot with routine healing, subsequent encounter  Work related injury  Sprain of left wrist, subsequent encounter  -     diclofenac sodium (VOLTAREN) 1 % Gel; Apply 2 g topically 3 (three) times daily.     Patient Instructions: Continue Physical Therapy, Daily home exercises/warm soaks      Restrictions: NONE  Regular Duty       Return Appointment: 10/16/2018 at 11:00 AM     surya

## 2018-09-18 NOTE — PROGRESS NOTES
Subjective:       Patient ID: Marsha Chun is a 73 y.o. female.    Chief Complaint: Ankle Pain    F/U FOR LEFT ANKLE PAIN (doi 3/22/2018) MRI report to be reviewed today. She reports left ankle much improved. She is currently in physical therapy.       Review of Systems   Constitution: Negative for chills, fever and weakness.   HENT: Negative for congestion, ear pain, hearing loss and nosebleeds.    Eyes: Negative for blurred vision, pain and redness.   Cardiovascular: Negative for chest pain, palpitations and syncope.   Respiratory: Negative for cough, shortness of breath and wheezing.    Hematologic/Lymphatic: Negative for bleeding problem.   Skin: Negative for color change, dry skin, itching and rash.   Musculoskeletal: Positive for joint pain. Negative for arthritis, back pain, gout, joint swelling, muscle weakness, neck pain and stiffness.   Gastrointestinal: Negative for abdominal pain, constipation, diarrhea, nausea and vomiting.   Genitourinary: Negative for dysuria, frequency and hematuria.   Neurological: Negative for dizziness, headaches, numbness, paresthesias and seizures.   Psychiatric/Behavioral: The patient is not nervous/anxious.    Allergic/Immunologic: Negative for hives.   All other systems reviewed and are negative.      Objective:      Physical Exam   Constitutional: She appears well-developed and well-nourished. She is active. No distress.   HENT:   Head: Normocephalic and atraumatic.   Right Ear: Hearing and external ear normal.   Left Ear: Hearing and external ear normal.   Nose: Nose normal. No nasal deformity. No epistaxis.   Mouth/Throat: Oropharynx is clear and moist and mucous membranes are normal.   Eyes: Conjunctivae and lids are normal. No scleral icterus.   Neck: Trachea normal and normal range of motion.   Cardiovascular: Intact distal pulses and normal pulses.   Pulmonary/Chest: Effort normal. No stridor. No respiratory distress.   Musculoskeletal:        Left ankle: She  exhibits normal range of motion, no swelling, no ecchymosis, no deformity and normal pulse. Tenderness. AITFL and CF ligament tenderness found. Achilles tendon normal.   Neurological: She is alert. She has normal strength. She is not disoriented. No sensory deficit. GCS eye subscore is 4. GCS verbal subscore is 5. GCS motor subscore is 6.   Skin: Skin is warm, dry and intact. Capillary refill takes less than 2 seconds. She is not diaphoretic.   Psychiatric: She has a normal mood and affect. Her speech is normal and behavior is normal. She is attentive.   Nursing note reviewed.        MRI left ankle w/o contrast shows partial ter of the ATFL and CFL and well healed oblique fracture at the base of the 4th MT. See report for full details.     Assessment:       1. Sprain of anterior talofibular ligament of left ankle, subsequent encounter    2. Sprain of calcaneofibular ligament of left ankle, subsequent encounter    3. Closed nondisplaced fracture of fourth metatarsal bone of left foot with routine healing, subsequent encounter    4. Work related injury    5. Sprain of left wrist, subsequent encounter        Plan:       I reviewed MRI results with the patient. Her pain appears much improved from last office visit.       Medications Ordered This Encounter   Medications    diclofenac sodium (VOLTAREN) 1 % Gel     Sig: Apply 2 g topically 3 (three) times daily.     Dispense:  1 Tube     Refill:  1     Patient Instructions: Continue Physical Therapy, Daily home exercises/warm soaks   Restrictions: Regular Duty  Follow-up in about 4 weeks (around 10/16/2018).

## 2018-09-25 ENCOUNTER — TELEPHONE (OUTPATIENT)
Dept: FAMILY MEDICINE | Facility: CLINIC | Age: 73
End: 2018-09-25

## 2018-09-25 NOTE — TELEPHONE ENCOUNTER
----- Message from Marcos Sevilla MD sent at 9/25/2018 11:29 AM CDT -----  Please call the patient regarding her result.  They are normal but we do not have a urine gonorrhea/chlamydia sample or result.  Please advise patient how she can submit urine specimen to be tested.

## 2018-09-25 NOTE — TELEPHONE ENCOUNTER
Pt was notified of lab results. Pt was also notified that a urin sample for gonorrhea / chlamydia .

## 2018-10-16 ENCOUNTER — OFFICE VISIT (OUTPATIENT)
Dept: URGENT CARE | Facility: CLINIC | Age: 73
End: 2018-10-16
Payer: COMMERCIAL

## 2018-10-16 DIAGNOSIS — S92.345D CLOSED NONDISPLACED FRACTURE OF FOURTH METATARSAL BONE OF LEFT FOOT WITH ROUTINE HEALING, SUBSEQUENT ENCOUNTER: ICD-10-CM

## 2018-10-16 DIAGNOSIS — S93.412D SPRAIN OF CALCANEOFIBULAR LIGAMENT OF LEFT ANKLE, SUBSEQUENT ENCOUNTER: ICD-10-CM

## 2018-10-16 DIAGNOSIS — S93.492D SPRAIN OF ANTERIOR TALOFIBULAR LIGAMENT OF LEFT ANKLE, SUBSEQUENT ENCOUNTER: Primary | ICD-10-CM
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
## 2018-10-16 PROCEDURE — 99214 OFFICE O/P EST MOD 30 MIN: CPT | Mod: S$GLB,,, | Performed by: PHYSICIAN ASSISTANT

## 2018-10-16 NOTE — PROGRESS NOTES
Subjective:       Patient ID: Marsha Chun is a 73 y.o. female.    Chief Complaint: Ankle Pain    F/u for left ankle injury (3/22/2018) Pt returns after completing therapy. She states that her ankle is doing better, but continues to experience some pain and discomfort with certain movements. She rates that pain 4/10 on pain scale. Pt takes celebrex and applies voltaren gel twice gaston with moderate relief. ajd      Review of Systems   Constitution: Negative for chills, fever and weakness.   HENT: Negative for congestion, ear pain, hearing loss and nosebleeds.    Eyes: Negative for blurred vision, pain and redness.   Cardiovascular: Negative for chest pain, palpitations and syncope.   Respiratory: Negative for cough, shortness of breath and wheezing.    Hematologic/Lymphatic: Negative for bleeding problem.   Skin: Negative for color change, dry skin, itching and rash.   Musculoskeletal: Positive for joint pain. Negative for arthritis, back pain, gout, joint swelling, muscle weakness, neck pain and stiffness.   Gastrointestinal: Negative for abdominal pain, constipation, diarrhea, nausea and vomiting.   Genitourinary: Negative for dysuria, frequency and hematuria.   Neurological: Negative for dizziness, headaches, numbness, paresthesias and seizures.   Psychiatric/Behavioral: The patient is not nervous/anxious.    Allergic/Immunologic: Negative for hives.   All other systems reviewed and are negative.      Objective:      Physical Exam   Constitutional: She appears well-developed and well-nourished. She is active. No distress.   HENT:   Head: Normocephalic and atraumatic.   Right Ear: Hearing and external ear normal.   Left Ear: Hearing and external ear normal.   Nose: Nose normal. No nasal deformity. No epistaxis.   Mouth/Throat: Oropharynx is clear and moist and mucous membranes are normal.   Eyes: Conjunctivae and lids are normal. No scleral icterus.   Neck: Trachea normal and normal range of motion.    Cardiovascular: Intact distal pulses and normal pulses.   Pulmonary/Chest: Effort normal. No stridor. No respiratory distress.   Musculoskeletal:        Left ankle: She exhibits normal range of motion, no swelling, no ecchymosis, no deformity and normal pulse. Tenderness. AITFL and CF ligament tenderness found. No head of 5th metatarsal tenderness found. Achilles tendon normal.   Neurological: She is alert. She has normal strength. She is not disoriented. No sensory deficit. GCS eye subscore is 4. GCS verbal subscore is 5. GCS motor subscore is 6.   Skin: Skin is warm, dry and intact. Capillary refill takes less than 2 seconds. She is not diaphoretic.   Psychiatric: She has a normal mood and affect. Her speech is normal and behavior is normal. She is attentive.   Nursing note reviewed.      Assessment:       1. Sprain of anterior talofibular ligament of left ankle, subsequent encounter    2. Sprain of calcaneofibular ligament of left ankle, subsequent encounter    3. Closed nondisplaced fracture of fourth metatarsal bone of left foot with routine healing, subsequent encounter    4. Work related injury        Plan:            Patient Instructions: Daily home exercises/warm soaks, Continue Physical Therapy(Continue voltaren gel )   Restrictions: Regular Duty, Sit or stand as needed  Follow-up in about 8 days (around 10/24/2018) for Dr. Francois.

## 2018-10-16 NOTE — LETTER
[facility]  [address], [address]  [facility] [address]  Phone: [phone]  Fax: [phone]    Pt Name: Marsha Chun  Injury Date: 03/22/2018   Employee ID: [id number] Date : 10/16/2018   Company: [employer]      Appointment Time: 11:00 AM Arrived: 10:45 AM   Provider: Vijay Doshi PA-C Time Out:11:38 AM     Office Treatment:   1. Sprain of anterior talofibular ligament of left ankle, subsequent encounter    2. Sprain of calcaneofibular ligament of left ankle, subsequent encounter    3. Closed nondisplaced fracture of fourth metatarsal bone of left foot with routine healing, subsequent encounter    4. Work related injury          Patient Instructions: Daily home exercises/warm soaks, Continue Physical Therapy(Continue voltaren gel )      Restrictions: Regular Duty,   Sit or stand as needed     Return Appointment: 10/24/2018 at 11:00 AM       surya

## 2018-10-24 ENCOUNTER — OFFICE VISIT (OUTPATIENT)
Dept: URGENT CARE | Facility: CLINIC | Age: 73
End: 2018-10-24
Payer: COMMERCIAL

## 2018-10-24 DIAGNOSIS — S93.412D SPRAIN OF CALCANEOFIBULAR LIGAMENT OF LEFT ANKLE, SUBSEQUENT ENCOUNTER: ICD-10-CM

## 2018-10-24 DIAGNOSIS — S93.492D SPRAIN OF ANTERIOR TALOFIBULAR LIGAMENT OF LEFT ANKLE, SUBSEQUENT ENCOUNTER: Primary | ICD-10-CM

## 2018-10-24 DIAGNOSIS — S92.345D CLOSED NONDISPLACED FRACTURE OF FOURTH METATARSAL BONE OF LEFT FOOT WITH ROUTINE HEALING, SUBSEQUENT ENCOUNTER: ICD-10-CM

## 2018-10-24 PROCEDURE — 99213 OFFICE O/P EST LOW 20 MIN: CPT | Mod: S$GLB,,, | Performed by: PREVENTIVE MEDICINE

## 2018-10-24 NOTE — LETTER
[facility]  [address], [address]  [facility] [address]  Phone: [phone]  Fax: [phone]  [facility]: [email or web address]    Pt Name: Marsha Chun  Injury Date: 03/22/2018   Employee ID: [id number] Date : 10/24/2018   Company: [employer]      Appointment Time: 11:00 AM Arrived: 10:57 AM   Provider: Tj Francois MD Time Out: 11:44 AM     Office Treatment:   1. Sprain of anterior talofibular ligament of left ankle, subsequent encounter    2. Closed nondisplaced fracture of fourth metatarsal bone of left foot with routine healing, subsequent encounter    3. Sprain of calcaneofibular ligament of left ankle, subsequent encounter          Patient Instructions: Daily home exercises/warm soaks, Continue Physical Therapy(May use Voltaren gel as needed.)      Restrictions: NONE   Regular Duty     Return Appointment: 11/14/2018 @ 11:00 AM       surya

## 2018-10-24 NOTE — PROGRESS NOTES
Subjective:       Patient ID: Marsha Chun is a 73 y.o. female.    Chief Complaint: Ankle Pain and Leg Pain    F/u for left ankle injury (3/22/2018) Pt reports intermittent left ankle pain, and a new random sharp pain to medial knee pain with each step. The first time she felt that sharp pain was this past Monday. Pt takes celebrex and uses voltaren gel daily. She is still in therapy for her ankle. Pain with walking is 8/10 on pain scale. ajd       Review of Systems   Constitution: Negative for chills, fever and weakness.   HENT: Negative for congestion, ear pain and nosebleeds.    Eyes: Negative for blurred vision and pain.   Cardiovascular: Negative for chest pain and palpitations.   Respiratory: Negative for cough, shortness of breath and wheezing.    Skin: Negative for dry skin, itching and rash.   Musculoskeletal: Positive for joint pain. Negative for arthritis, back pain, gout, joint swelling, muscle weakness, neck pain and stiffness.   Gastrointestinal: Negative for abdominal pain, constipation, diarrhea, nausea and vomiting.   Genitourinary: Negative for dysuria, frequency and hematuria.   Neurological: Negative for dizziness, headaches, numbness and seizures.   Allergic/Immunologic: Negative for hives.   All other systems reviewed and are negative.      Objective:      Physical Exam   Constitutional: She appears well-developed and well-nourished. She is active. No distress.   HENT:   Head: Normocephalic and atraumatic.   Right Ear: Hearing and external ear normal.   Left Ear: Hearing and external ear normal.   Nose: Nose normal. No nasal deformity. No epistaxis.   Mouth/Throat: Oropharynx is clear and moist and mucous membranes are normal.   Eyes: Conjunctivae, EOM and lids are normal. Pupils are equal, round, and reactive to light. No scleral icterus.   Neck: Trachea normal and normal range of motion.   Cardiovascular: Normal rate, intact distal pulses and normal pulses.   Pulmonary/Chest: Effort  normal. No stridor. No respiratory distress.   Musculoskeletal:        Left ankle: She exhibits normal range of motion, no swelling, no ecchymosis, no deformity and normal pulse. Tenderness. AITFL and CF ligament tenderness found. Achilles tendon normal.        Lumbar back: She exhibits decreased range of motion, tenderness, pain and spasm. She exhibits no bony tenderness, no swelling, no edema, no deformity, no laceration and normal pulse.        Feet:    Patient has continued complaints of pain about left foot and ankle laterally. She has pain with full flexion, extension, eversion and inversion of left ankle and foot. Distal pulses intact.   Neurological: She is alert. She has normal strength. She is not disoriented. No sensory deficit. GCS eye subscore is 4. GCS verbal subscore is 5. GCS motor subscore is 6.   No focal neurologic deficits   Skin: Skin is warm, dry and intact. Capillary refill takes less than 2 seconds. She is not diaphoretic.   Psychiatric: She has a normal mood and affect. Her speech is normal and behavior is normal. She is attentive.   Nursing note and vitals reviewed.      Assessment:       1. Sprain of anterior talofibular ligament of left ankle, subsequent encounter    2. Closed nondisplaced fracture of fourth metatarsal bone of left foot with routine healing, subsequent encounter    3. Sprain of calcaneofibular ligament of left ankle, subsequent encounter        Plan:            Patient Instructions: Daily home exercises/warm soaks, Continue Physical Therapy(May use Voltaren gel as needed.)   Restrictions: Regular Duty  Follow-up in about 3 weeks (around 11/14/2018).

## 2018-11-14 ENCOUNTER — OFFICE VISIT (OUTPATIENT)
Dept: URGENT CARE | Facility: CLINIC | Age: 73
End: 2018-11-14
Payer: COMMERCIAL

## 2018-11-14 DIAGNOSIS — S93.412D SPRAIN OF CALCANEOFIBULAR LIGAMENT OF LEFT ANKLE, SUBSEQUENT ENCOUNTER: ICD-10-CM

## 2018-11-14 DIAGNOSIS — S93.492D SPRAIN OF ANTERIOR TALOFIBULAR LIGAMENT OF LEFT ANKLE, SUBSEQUENT ENCOUNTER: Primary | ICD-10-CM

## 2018-11-14 DIAGNOSIS — S92.345D CLOSED NONDISPLACED FRACTURE OF FOURTH METATARSAL BONE OF LEFT FOOT WITH ROUTINE HEALING, SUBSEQUENT ENCOUNTER: ICD-10-CM

## 2018-11-14 PROCEDURE — 99214 OFFICE O/P EST MOD 30 MIN: CPT | Mod: S$GLB,,, | Performed by: PREVENTIVE MEDICINE

## 2018-11-14 NOTE — LETTER
[facility]  [address], [address]  [place] [address]  Phone: [phone]  Fax: [phone]  [facility]: [id number]    Pt Name: Marsha Chun  Injury Date: 03/22/2018   Employee ID: [id number] Date: 11/14/2018   Company: [employer]      Appointment Time: 11:00 AM Arrived: 11:10 AM   Provider: Tj Francois MD Time Out: 12:50 PM     Office Treatment:   1. Sprain of anterior talofibular ligament of left ankle, subsequent encounter    2. Closed nondisplaced fracture of fourth metatarsal bone of left foot with routine healing, subsequent encounter    3. Sprain of calcaneofibular ligament of left ankle, subsequent encounter          Patient Instructions: Daily home exercises/warm soaks, Discontinue Physical Therapy(Continue OTC Aleve as needed.)      Restrictions: NONE  Regular Duty     Return Appointment: 12/12/2018 at 11:00 AM       surya

## 2018-11-14 NOTE — PROGRESS NOTES
"Subjective:       Patient ID: Marsha Chun is a 73 y.o. female.    Chief Complaint: Ankle Injury (Left)    F/u for left ankle injury (3/22/2018) Pt reports "better" Physical therapist has k-taped pt's ankle and pt states that it help a lot. She reports right hip pain, and states she thinks its from compensation. Pt is taking Aleve and topical pain aid as needed for pain with mild to moderate relief. Current pain 0/10. Pain usually increased after her work shift. ajd       Review of Systems   Constitution: Negative for chills, fever and weakness.   HENT: Negative for congestion, ear pain and nosebleeds.    Eyes: Negative for blurred vision and pain.   Cardiovascular: Negative for chest pain and palpitations.   Respiratory: Negative for cough, shortness of breath and wheezing.    Skin: Negative for dry skin, itching and rash.   Musculoskeletal: Positive for joint pain. Negative for arthritis, back pain, gout, joint swelling, muscle weakness, neck pain and stiffness.   Gastrointestinal: Negative for abdominal pain, constipation, diarrhea, nausea and vomiting.   Genitourinary: Negative for dysuria, frequency and hematuria.   Neurological: Negative for dizziness, headaches, numbness and seizures.   Allergic/Immunologic: Negative for hives.   All other systems reviewed and are negative.      Objective:      Physical Exam   Constitutional: She appears well-developed and well-nourished. She is active. No distress.   HENT:   Head: Normocephalic and atraumatic.   Right Ear: Hearing and external ear normal.   Left Ear: Hearing and external ear normal.   Nose: Nose normal. No nasal deformity. No epistaxis.   Mouth/Throat: Oropharynx is clear and moist and mucous membranes are normal.   Eyes: Conjunctivae, EOM and lids are normal. Pupils are equal, round, and reactive to light. No scleral icterus.   Neck: Trachea normal and normal range of motion.   Cardiovascular: Normal rate, intact distal pulses and normal pulses. "   Pulmonary/Chest: Effort normal. No stridor. No respiratory distress.   Musculoskeletal:        Left ankle: She exhibits normal range of motion, no swelling, no ecchymosis, no deformity and normal pulse. Tenderness. AITFL and CF ligament tenderness found. Achilles tendon normal.        Lumbar back: She exhibits decreased range of motion, tenderness, pain and spasm. She exhibits no bony tenderness, no swelling, no edema, no deformity, no laceration and normal pulse.        Feet:    Patient has continued complaints of pain about left foot and ankle laterally. She has pain with full flexion, extension, eversion and inversion of left ankle and foot. Distal pulses intact.   Neurological: She is alert. She has normal strength. She is not disoriented. No sensory deficit. GCS eye subscore is 4. GCS verbal subscore is 5. GCS motor subscore is 6.   No focal neurologic deficits   Skin: Skin is warm, dry and intact. Capillary refill takes less than 2 seconds. She is not diaphoretic.   Psychiatric: She has a normal mood and affect. Her speech is normal and behavior is normal. She is attentive.   Nursing note and vitals reviewed.      Assessment:       1. Sprain of anterior talofibular ligament of left ankle, subsequent encounter    2. Closed nondisplaced fracture of fourth metatarsal bone of left foot with routine healing, subsequent encounter    3. Sprain of calcaneofibular ligament of left ankle, subsequent encounter        Plan:            Patient Instructions: Daily home exercises/warm soaks, Discontinue Physical Therapy(Continue OTC Aleve as needed.)   Restrictions: Regular Duty  Follow-up in about 4 weeks (around 12/12/2018).

## 2018-12-10 PROBLEM — Z00.00 ANNUAL PHYSICAL EXAM: Status: RESOLVED | Noted: 2017-01-13 | Resolved: 2018-12-10

## 2018-12-12 ENCOUNTER — OFFICE VISIT (OUTPATIENT)
Dept: URGENT CARE | Facility: CLINIC | Age: 73
End: 2018-12-12
Payer: COMMERCIAL

## 2018-12-12 DIAGNOSIS — S92.345D CLOSED NONDISPLACED FRACTURE OF FOURTH METATARSAL BONE OF LEFT FOOT WITH ROUTINE HEALING, SUBSEQUENT ENCOUNTER: ICD-10-CM

## 2018-12-12 DIAGNOSIS — S93.492D SPRAIN OF ANTERIOR TALOFIBULAR LIGAMENT OF LEFT ANKLE, SUBSEQUENT ENCOUNTER: Primary | ICD-10-CM

## 2018-12-12 DIAGNOSIS — S93.412D SPRAIN OF CALCANEOFIBULAR LIGAMENT OF LEFT ANKLE, SUBSEQUENT ENCOUNTER: ICD-10-CM

## 2018-12-12 PROCEDURE — 99214 OFFICE O/P EST MOD 30 MIN: CPT | Mod: S$GLB,,, | Performed by: PREVENTIVE MEDICINE

## 2018-12-12 NOTE — PROGRESS NOTES
Subjective:       Patient ID: Marsha Chun is a 73 y.o. female.    Chief Complaint: Ankle Injury (left)     F/u for left ankle injury (3/22/2018) Pt states that her ankle feels good. She has mild pain or discomfort with prolonged standing but nothing she can't tolerate. She continues to apply voltaren gel with significant relief. ajd      Review of Systems   Constitution: Negative for chills, fever and weakness.   HENT: Negative for congestion, ear pain and nosebleeds.    Eyes: Negative for blurred vision and pain.   Cardiovascular: Negative for chest pain and palpitations.   Respiratory: Negative for cough, shortness of breath and wheezing.    Skin: Negative for dry skin, itching and rash.   Musculoskeletal: Positive for joint pain and myalgias. Negative for arthritis, back pain, gout, joint swelling, muscle weakness, neck pain and stiffness.   Gastrointestinal: Negative for abdominal pain, constipation, diarrhea, nausea and vomiting.   Genitourinary: Negative for dysuria, frequency and hematuria.   Neurological: Negative for dizziness, headaches, numbness and seizures.   Allergic/Immunologic: Negative for hives.   All other systems reviewed and are negative.      Objective:      Physical Exam   Constitutional: She appears well-developed and well-nourished.   HENT:   Head: Normocephalic.   Eyes: Pupils are equal, round, and reactive to light.   Neck: Normal range of motion.   Cardiovascular: Normal rate.   Pulmonary/Chest: Effort normal.   Musculoskeletal:        Left ankle: She exhibits normal range of motion, no swelling, no ecchymosis, no deformity, no laceration and normal pulse. Tenderness. No lateral malleolus, no medial malleolus, no AITFL, no CF ligament, no posterior TFL, no head of 5th metatarsal and no proximal fibula tenderness found.        Lumbar back: She exhibits no bony tenderness, no swelling, no edema, no deformity, no laceration, no spasm and normal pulse.        Feet:    Mild tenderness  to palpation otherwise full range of motion with no pain about left ankle and foot.   Neurological: She is alert.   No focal neurologic deficits   Skin: Skin is warm.   Psychiatric: She has a normal mood and affect.   Nursing note and vitals reviewed.      Assessment:       1. Sprain of anterior talofibular ligament of left ankle, subsequent encounter    2. Closed nondisplaced fracture of fourth metatarsal bone of left foot with routine healing, subsequent encounter    3. Sprain of calcaneofibular ligament of left ankle, subsequent encounter        Plan:            Patient Instructions: Daily home exercises/warm soaks   Restrictions: Regular Duty, Discharged from Occupational Health  Follow-up if symptoms worsen or fail to improve.

## 2018-12-12 NOTE — LETTER
[facility]  [address], [address]  [place] [address]  Phone: [phone]  Fax: [phone]  [facility]: [email or web address]    Pt Name: Marsha Chun  Injury Date: 03/22/2018   Employee ID: [id number] Date: 12/12/2018   Company: [employer]      Appointment Time: 11:00 AM Arrived: 11:00 AM   Provider: Tj Francois MD Time Out: 11:42 AM     Office Treatment:   1. Sprain of anterior talofibular ligament of left ankle, subsequent encounter    2. Closed nondisplaced fracture of fourth metatarsal bone of left foot with routine healing, subsequent encounter    3. Sprain of calcaneofibular ligament of left ankle, subsequent encounter          Patient Instructions: Daily home exercises/warm soaks      Restrictions: NONE  Regular Duty,   Discharged from Occupational Health

## 2018-12-18 DIAGNOSIS — Z00.00 HEALTHCARE MAINTENANCE: Primary | ICD-10-CM

## 2018-12-19 ENCOUNTER — TELEPHONE (OUTPATIENT)
Dept: FAMILY MEDICINE | Facility: CLINIC | Age: 73
End: 2018-12-19

## 2018-12-19 NOTE — TELEPHONE ENCOUNTER
----- Message from Marcos Sevilla MD sent at 12/18/2018 12:01 PM CST -----  Contact: self  Patient just had mammo in Sept 2018 and is not due til upcoming Sept.  I placed orders for blood work which needs to be done fasting.  Thanks.  ----- Message -----  From: Brody Vela MA  Sent: 12/18/2018  11:01 AM  To: Marcos Sevilla MD    Hi,    Pt has appt on 1/12/19 and is requesing labs and mammogram orders.     Thanks,  Brody   ----- Message -----  From: Imelda Leon  Sent: 12/18/2018  10:56 AM  To: [facility]end[facility] Staff    Please call pt to schedule annual labs and mammo. Please call [phone].

## 2019-01-21 ENCOUNTER — OFFICE VISIT (OUTPATIENT)
Dept: FAMILY MEDICINE | Facility: CLINIC | Age: 74
End: 2019-01-21
Payer: COMMERCIAL

## 2019-01-21 ENCOUNTER — LAB VISIT (OUTPATIENT)
Dept: LAB | Facility: HOSPITAL | Age: 74
End: 2019-01-21
Attending: INTERNAL MEDICINE
Payer: COMMERCIAL

## 2019-01-21 VITALS
DIASTOLIC BLOOD PRESSURE: 70 MMHG | SYSTOLIC BLOOD PRESSURE: 100 MMHG | WEIGHT: 190.94 LBS | RESPIRATION RATE: 17 BRPM | BODY MASS INDEX: 31.81 KG/M2 | HEIGHT: 65 IN | HEART RATE: 68 BPM | OXYGEN SATURATION: 98 % | TEMPERATURE: 98 F

## 2019-01-21 DIAGNOSIS — I10 ESSENTIAL HYPERTENSION: ICD-10-CM

## 2019-01-21 DIAGNOSIS — E66.9 OBESITY (BMI 30.0-34.9): ICD-10-CM

## 2019-01-21 DIAGNOSIS — Z86.73 HISTORY OF TRANSIENT ISCHEMIC ATTACK (TIA): ICD-10-CM

## 2019-01-21 DIAGNOSIS — Z00.00 HEALTHCARE MAINTENANCE: ICD-10-CM

## 2019-01-21 DIAGNOSIS — E21.0 HYPERPARATHYROIDISM, PRIMARY: ICD-10-CM

## 2019-01-21 DIAGNOSIS — N32.81 OAB (OVERACTIVE BLADDER): ICD-10-CM

## 2019-01-21 DIAGNOSIS — Z11.3 SCREEN FOR STD (SEXUALLY TRANSMITTED DISEASE): ICD-10-CM

## 2019-01-21 DIAGNOSIS — E78.2 MIXED HYPERLIPIDEMIA: ICD-10-CM

## 2019-01-21 DIAGNOSIS — Z00.00 HEALTHCARE MAINTENANCE: Primary | ICD-10-CM

## 2019-01-21 PROBLEM — E66.811 OBESITY (BMI 30.0-34.9): Status: ACTIVE | Noted: 2019-01-21

## 2019-01-21 LAB
ALBUMIN SERPL BCP-MCNC: 3.9 G/DL
ALP SERPL-CCNC: 157 U/L
ALT SERPL W/O P-5'-P-CCNC: 13 U/L
ANION GAP SERPL CALC-SCNC: 8 MMOL/L
AST SERPL-CCNC: 21 U/L
BASOPHILS # BLD AUTO: 0.04 K/UL
BASOPHILS NFR BLD: 0.5 %
BILIRUB SERPL-MCNC: 0.7 MG/DL
BUN SERPL-MCNC: 18 MG/DL
CALCIUM SERPL-MCNC: 9.8 MG/DL
CHLORIDE SERPL-SCNC: 102 MMOL/L
CHOLEST SERPL-MCNC: 242 MG/DL
CHOLEST/HDLC SERPL: 2.8 {RATIO}
CO2 SERPL-SCNC: 28 MMOL/L
CREAT SERPL-MCNC: 0.8 MG/DL
DIFFERENTIAL METHOD: ABNORMAL
EOSINOPHIL # BLD AUTO: 0.1 K/UL
EOSINOPHIL NFR BLD: 1.4 %
ERYTHROCYTE [DISTWIDTH] IN BLOOD BY AUTOMATED COUNT: 14.8 %
EST. GFR  (AFRICAN AMERICAN): >60 ML/MIN/1.73 M^2
EST. GFR  (NON AFRICAN AMERICAN): >60 ML/MIN/1.73 M^2
GLUCOSE SERPL-MCNC: 76 MG/DL
HCT VFR BLD AUTO: 39.6 %
HDLC SERPL-MCNC: 87 MG/DL
HDLC SERPL: 36 %
HGB BLD-MCNC: 13.9 G/DL
LDLC SERPL CALC-MCNC: 131.8 MG/DL
LYMPHOCYTES # BLD AUTO: 2 K/UL
LYMPHOCYTES NFR BLD: 24.9 %
MCH RBC QN AUTO: 26.4 PG
MCHC RBC AUTO-ENTMCNC: 35.1 G/DL
MCV RBC AUTO: 75 FL
MONOCYTES # BLD AUTO: 0.5 K/UL
MONOCYTES NFR BLD: 6.5 %
NEUTROPHILS # BLD AUTO: 5.2 K/UL
NEUTROPHILS NFR BLD: 66.7 %
NONHDLC SERPL-MCNC: 155 MG/DL
PLATELET # BLD AUTO: 224 K/UL
PMV BLD AUTO: 11.7 FL
POTASSIUM SERPL-SCNC: 4.2 MMOL/L
PROT SERPL-MCNC: 7.4 G/DL
PTH-INTACT SERPL-MCNC: 111.9 PG/ML
RBC # BLD AUTO: 5.27 M/UL
SODIUM SERPL-SCNC: 138 MMOL/L
TRIGL SERPL-MCNC: 116 MG/DL
TSH SERPL DL<=0.005 MIU/L-ACNC: 2.28 UIU/ML
WBC # BLD AUTO: 7.84 K/UL

## 2019-01-21 PROCEDURE — 36415 COLL VENOUS BLD VENIPUNCTURE: CPT | Mod: PN

## 2019-01-21 PROCEDURE — 87491 CHLMYD TRACH DNA AMP PROBE: CPT

## 2019-01-21 PROCEDURE — 99999 PR PBB SHADOW E&M-EST. PATIENT-LVL IV: CPT | Mod: PBBFAC,,, | Performed by: INTERNAL MEDICINE

## 2019-01-21 PROCEDURE — 3074F SYST BP LT 130 MM HG: CPT | Mod: CPTII,S$GLB,, | Performed by: INTERNAL MEDICINE

## 2019-01-21 PROCEDURE — 80061 LIPID PANEL: CPT

## 2019-01-21 PROCEDURE — 99999 PR PBB SHADOW E&M-EST. PATIENT-LVL IV: ICD-10-PCS | Mod: PBBFAC,,, | Performed by: INTERNAL MEDICINE

## 2019-01-21 PROCEDURE — 1101F PR PT FALLS ASSESS DOC 0-1 FALLS W/OUT INJ PAST YR: ICD-10-PCS | Mod: CPTII,S$GLB,, | Performed by: INTERNAL MEDICINE

## 2019-01-21 PROCEDURE — 90670 PCV13 VACCINE IM: CPT | Mod: S$GLB,,, | Performed by: INTERNAL MEDICINE

## 2019-01-21 PROCEDURE — 84443 ASSAY THYROID STIM HORMONE: CPT

## 2019-01-21 PROCEDURE — 83970 ASSAY OF PARATHORMONE: CPT

## 2019-01-21 PROCEDURE — 85025 COMPLETE CBC W/AUTO DIFF WBC: CPT

## 2019-01-21 PROCEDURE — 3078F PR MOST RECENT DIASTOLIC BLOOD PRESSURE < 80 MM HG: ICD-10-PCS | Mod: CPTII,S$GLB,, | Performed by: INTERNAL MEDICINE

## 2019-01-21 PROCEDURE — 3078F DIAST BP <80 MM HG: CPT | Mod: CPTII,S$GLB,, | Performed by: INTERNAL MEDICINE

## 2019-01-21 PROCEDURE — 90670 PNEUMOCOCCAL CONJUGATE VACCINE 13-VALENT LESS THAN 5YO & GREATER THAN: ICD-10-PCS | Mod: S$GLB,,, | Performed by: INTERNAL MEDICINE

## 2019-01-21 PROCEDURE — 3074F PR MOST RECENT SYSTOLIC BLOOD PRESSURE < 130 MM HG: ICD-10-PCS | Mod: CPTII,S$GLB,, | Performed by: INTERNAL MEDICINE

## 2019-01-21 PROCEDURE — 1101F PT FALLS ASSESS-DOCD LE1/YR: CPT | Mod: CPTII,S$GLB,, | Performed by: INTERNAL MEDICINE

## 2019-01-21 PROCEDURE — 99214 PR OFFICE/OUTPT VISIT, EST, LEVL IV, 30-39 MIN: ICD-10-PCS | Mod: 25,S$GLB,, | Performed by: INTERNAL MEDICINE

## 2019-01-21 PROCEDURE — 90471 IMMUNIZATION ADMIN: CPT | Mod: 59,S$GLB,, | Performed by: INTERNAL MEDICINE

## 2019-01-21 PROCEDURE — 90471 PNEUMOCOCCAL CONJUGATE VACCINE 13-VALENT LESS THAN 5YO & GREATER THAN: ICD-10-PCS | Mod: 59,S$GLB,, | Performed by: INTERNAL MEDICINE

## 2019-01-21 PROCEDURE — 99214 OFFICE O/P EST MOD 30 MIN: CPT | Mod: 25,S$GLB,, | Performed by: INTERNAL MEDICINE

## 2019-01-21 PROCEDURE — 80053 COMPREHEN METABOLIC PANEL: CPT

## 2019-01-21 PROCEDURE — 82306 VITAMIN D 25 HYDROXY: CPT

## 2019-01-21 RX ORDER — ATENOLOL AND CHLORTHALIDONE TABLET 50; 25 MG/1; MG/1
1 TABLET ORAL DAILY
Qty: 90 TABLET | Refills: 3 | Status: SHIPPED | OUTPATIENT
Start: 2019-01-21 | End: 2020-01-17 | Stop reason: SDUPTHER

## 2019-01-21 RX ORDER — ATORVASTATIN CALCIUM 10 MG/1
10 TABLET, FILM COATED ORAL DAILY
Qty: 90 TABLET | Refills: 3 | Status: SHIPPED | OUTPATIENT
Start: 2019-01-21 | End: 2020-01-27 | Stop reason: ALTCHOICE

## 2019-01-21 NOTE — PATIENT INSTRUCTIONS
Leg Muscle Stretches: Knee Flexion    The following flexibility exercise may be suggested by your therapist. Repeat as many times as instructed. Stop the exercise if it causes pain, and discuss it with your physical therapist or healthcare provider:  · Sit with your legs extended, foot flexed. Place a towel around one heel. Hold one end of the towel in each hand.  · Pull the towel toward you, sliding your heel toward your rear end. Keep your heel in contact with the mat. Be sure the pull is gentle and don't bounce.   · You should feel a stretch across the front of your knee. Hold for 10 to 15 seconds. Then slowly slide your foot back out.  · Repeat 5 times.  Date Last Reviewed: 10/15/2015  © 7331-6668 [employer]. [address] [place] [address]. All rights reserved. This information is not intended as a substitute for professional medical care. Always follow your healthcare professional's instructions.        Bent-Knee Calf Stretch    This exercise is designed to stretch and strengthen your feet and ankles. Before beginning the exercise, read through all the instructions. While exercising, breathe normally and dont bounce. If you feel any pain, stop the exercise. If pain persists, inform your healthcare provider:  · Stand an arms length away from a wall. Place the palms of your hands on the wall. Step forward about 12 inches with your ______ foot.  · Keeping toes pointed forward and both heels on the floor, bend both knees and lean forward. Hold for ______ seconds. Relax.  · Repeat ______ times. Do ______ sets a day.  Date Last Reviewed: 8/16/2015  © 9867-6374 [employer]. [address] [place] [address]. All rights reserved. This information is not intended as a substitute for professional medical care. Always follow your healthcare professional's instructions.        Lower Body Exercises: Calf Stretch    This exercise both stretches and strengthens your lower  body to help your back. Do the exercise as often as suggested by your healthcare provider. As you work out, dont rush or strain. Use an exercise mat, pillow, or folded towel to protect your knees and other sensitive areas.  · Face a wall 2 feet away. Step toward the wall with one foot.  · Place both palms on the wall and bend your front knee.  · Lean forward, keeping the back leg straight and the heel on the floor.  · Hold for 10 to 30 seconds. Switch legs.  Date Last Reviewed: 11/14/2015  © 3672-5627 [employer]. [address] [place] [address]. All rights reserved. This information is not intended as a substitute for professional medical care. Always follow your healthcare professional's instructions.

## 2019-01-22 LAB
25(OH)D3+25(OH)D2 SERPL-MCNC: 31 NG/ML
C TRACH DNA SPEC QL NAA+PROBE: NOT DETECTED
N GONORRHOEA DNA SPEC QL NAA+PROBE: NOT DETECTED

## 2019-01-30 ENCOUNTER — TELEPHONE (OUTPATIENT)
Dept: FAMILY MEDICINE | Facility: CLINIC | Age: 74
End: 2019-01-30

## 2019-01-30 DIAGNOSIS — E21.0 HYPERPARATHYROIDISM, PRIMARY: Primary | ICD-10-CM

## 2019-01-30 DIAGNOSIS — R74.8 ELEVATED ALKALINE PHOSPHATASE LEVEL: ICD-10-CM

## 2019-01-30 NOTE — TELEPHONE ENCOUNTER
Called regarding labs with elevated alk phos and primary hyperparathyroidism.  Advised to come for blood work and needs endocrinology visit.

## 2019-01-30 NOTE — TELEPHONE ENCOUNTER
----- Message from Marcos Sevilla MD sent at 1/30/2019 12:37 PM CST -----  Please call the patient to come and have follow up blood work (GGT and CMP) drawn.  I spoke with her on phone that she needs to come in.

## 2019-02-01 ENCOUNTER — LAB VISIT (OUTPATIENT)
Dept: LAB | Facility: HOSPITAL | Age: 74
End: 2019-02-01
Attending: INTERNAL MEDICINE
Payer: COMMERCIAL

## 2019-02-01 DIAGNOSIS — R74.8 ELEVATED ALKALINE PHOSPHATASE LEVEL: ICD-10-CM

## 2019-02-01 DIAGNOSIS — E21.0 HYPERPARATHYROIDISM, PRIMARY: ICD-10-CM

## 2019-02-01 LAB
ALBUMIN SERPL BCP-MCNC: 3.6 G/DL
ALP SERPL-CCNC: 126 U/L
ALT SERPL W/O P-5'-P-CCNC: 10 U/L
AST SERPL-CCNC: 18 U/L
BILIRUB DIRECT SERPL-MCNC: 0.3 MG/DL
BILIRUB SERPL-MCNC: 0.6 MG/DL
GGT SERPL-CCNC: 16 U/L
PROT SERPL-MCNC: 7 G/DL

## 2019-02-01 PROCEDURE — 36415 COLL VENOUS BLD VENIPUNCTURE: CPT | Mod: PN

## 2019-02-01 PROCEDURE — 80076 HEPATIC FUNCTION PANEL: CPT

## 2019-02-01 PROCEDURE — 82977 ASSAY OF GGT: CPT

## 2019-02-06 ENCOUNTER — TELEPHONE (OUTPATIENT)
Dept: FAMILY MEDICINE | Facility: CLINIC | Age: 74
End: 2019-02-06

## 2019-02-06 NOTE — TELEPHONE ENCOUNTER
----- Message from Marcos Sevilla MD sent at 2/6/2019 11:35 AM CST -----  Please call the patient regarding her result.  Repeat liver function test is normal.  She may still schedule her endocrinology follow up at her convenience.

## 2019-02-13 ENCOUNTER — OFFICE VISIT (OUTPATIENT)
Dept: ENDOCRINOLOGY | Facility: CLINIC | Age: 74
End: 2019-02-13
Payer: COMMERCIAL

## 2019-02-13 VITALS
WEIGHT: 185.75 LBS | HEIGHT: 65 IN | HEART RATE: 72 BPM | SYSTOLIC BLOOD PRESSURE: 104 MMHG | DIASTOLIC BLOOD PRESSURE: 72 MMHG | BODY MASS INDEX: 30.95 KG/M2

## 2019-02-13 DIAGNOSIS — M85.80 OSTEOPENIA, UNSPECIFIED LOCATION: Primary | ICD-10-CM

## 2019-02-13 PROCEDURE — 99999 PR PBB SHADOW E&M-EST. PATIENT-LVL III: CPT | Mod: PBBFAC,,, | Performed by: INTERNAL MEDICINE

## 2019-02-13 PROCEDURE — 99999 PR PBB SHADOW E&M-EST. PATIENT-LVL III: ICD-10-PCS | Mod: PBBFAC,,, | Performed by: INTERNAL MEDICINE

## 2019-02-13 PROCEDURE — 99244 PR OFFICE CONSULTATION,LEVEL IV: ICD-10-PCS | Mod: S$GLB,,, | Performed by: INTERNAL MEDICINE

## 2019-02-13 PROCEDURE — 99244 OFF/OP CNSLTJ NEW/EST MOD 40: CPT | Mod: S$GLB,,, | Performed by: INTERNAL MEDICINE

## 2019-02-13 NOTE — PROGRESS NOTES
Subjective:      Patient ID: Marsha Chun is a 74 y.o. female.    Chief Complaint:  Consult  Consult from Dr. Marcos Sevilla for hyperparathyroidism    History of Present Illness      She was first noted to have hypercalcemia in 2009 - this was the only episode noted   Her pth was first checked at that time and was elevated   She was on hctz at that time which was stopped and since there has been normalization of the calcium but the pth has remained elevated   She was advised to have surgery based on osteopenia     Sestamibi 2011   Impression:                                                                                                                                               No findings to suggest parathyroid adenoma.                                                                                  U/s 2011   IMPRESSION:                                                                                                                                               #1. No significant abnormalities are identified.                                                                                    She denies current hctz or lithium use  Her daily intake of calcium is from food sources   On otc vit d and now taking sublingual vit d 5000 units daily    She denies constipation, depression    Her last bmd was 5/2013  IMPRESSION:                                                                                                                                                 Low bone mass/osteopenia of the lumbar spine.  Decrease in hip BMD         (-6.1%) since prior study.  No significant change in lumbar spine BMD.       FRAX calculations do not suggest treatment.        Denies fractures or kidney stones         Review of Systems   Constitutional: Negative for unexpected weight change.   HENT: Negative for hearing loss.    Eyes: Negative for visual disturbance.   Respiratory: Negative for cough.    Cardiovascular:  Negative for chest pain.   Gastrointestinal: Negative for abdominal pain.   Genitourinary: Negative for frequency.   Musculoskeletal: Negative for arthralgias.   Skin: Negative for rash.   Neurological: Negative for headaches.   Hematological: Does not bruise/bleed easily.   Psychiatric/Behavioral: The patient is not nervous/anxious.    All other systems reviewed and are negative.      Objective:   Physical Exam   Constitutional: She is oriented to person, place, and time. She appears well-developed and well-nourished.   HENT:   Head: Normocephalic.   Neck: Phonation normal. Neck supple. No tracheal tenderness present. No tracheal deviation and no edema present. No thyroid mass and no thyromegaly present.   Cardiovascular: Normal rate, regular rhythm and normal heart sounds.   No murmur heard.  Pulmonary/Chest: Effort normal and breath sounds normal.   Abdominal: Soft. Bowel sounds are normal. She exhibits no distension. There is no hepatomegaly. There is no tenderness.   Musculoskeletal: Normal range of motion. She exhibits no edema.   Lymphadenopathy:     She has no cervical adenopathy.   Neurological: She is alert and oriented to person, place, and time. She has normal reflexes. No cranial nerve deficit.   Skin: Skin is warm and dry. No bruising and no rash noted. No erythema.   Psychiatric: She has a normal mood and affect. Her behavior is normal. Judgment and thought content normal. Cognition and memory are normal.       Lab Review:   Results for orders placed or performed in visit on 02/01/19   Gamma GT   Result Value Ref Range    GGT 16 8 - 55 U/L   Hepatic function panel   Result Value Ref Range    Total Protein 7.0 6.0 - 8.4 g/dL    Albumin 3.6 3.5 - 5.2 g/dL    Total Bilirubin 0.6 0.1 - 1.0 mg/dL    Bilirubin, Direct 0.3 0.1 - 0.3 mg/dL    AST 18 10 - 40 U/L    ALT 10 10 - 44 U/L    Alkaline Phosphatase 126 55 - 135 U/L       Calcium 9.8 mg/dl.  MAX PTH:120-6X calcium-1/2 VitD +1/4 AGE  PHP  suspected when PTH exceeds MAX PTH  MAXPTH= 120-58.8-15.5+18=63.7  This calculation suggests primary hyperparathyroidism as  which exceeds 63.7  Assessment:   1 Hyperparathyroidism- question normocalcemia primary hyperparathyroidism  currently no indications for surgical intervention (in addition she does not want surgery unless absolutely necessary)   Check 24 urine calcium excretion   Check BMD  Calcium in 12 months   rtc 2  Years    2 osteopenia-- discussed implications  Reassuring not fracturing.   NEEDS UPDATED BMD  3 htn - avoid hctz if able     Condition is very mild and no need for parathyroid surgery unless BMD has worsened significantly.

## 2019-02-13 NOTE — LETTER
February 13, 2019      Marcos Sevilla MD  [address]  [place] [facility] [address]           [facility]  [address]  [facility] [address]  Phone: [phone]          Patient: Marsha Chun   MR Number: [id number]   YOB: 1945   Date of Visit: 2/13/2019       Dear Dr. Marcos Sevilla:    Thank you for referring Marsha Chun to me for evaluation. Attached you will find relevant portions of my assessment and plan of care.    If you have questions, please do not hesitate to call me. I look forward to following Marsha Chun along with you.    Sincerely,    Alber Jaquez II, MD    Enclosure  CC:  No Recipients    If you would like to receive this communication electronically, please contact [email or web address] or [phone] to request more information on [facility] Link access.    For providers and/or their staff who would like to refer a patient to [facility], please contact us through our one-stop-shop provider referral line, [facility], at [phone].    If you feel you have received this communication in error or would no longer like to receive these types of communications, please e-mail [email or web address]

## 2019-02-20 ENCOUNTER — HOSPITAL ENCOUNTER (OUTPATIENT)
Dept: RADIOLOGY | Facility: CLINIC | Age: 74
Discharge: HOME | End: 2019-02-20
Attending: INTERNAL MEDICINE
Payer: COMMERCIAL

## 2019-02-20 DIAGNOSIS — M85.80 OSTEOPENIA, UNSPECIFIED LOCATION: ICD-10-CM

## 2019-02-20 PROCEDURE — 77080 DXA BONE DENSITY AXIAL: CPT | Mod: 26,,, | Performed by: INTERNAL MEDICINE

## 2019-02-20 PROCEDURE — 77080 DEXA BONE DENSITY SPINE HIP: ICD-10-PCS | Mod: 26,,, | Performed by: INTERNAL MEDICINE

## 2019-02-20 PROCEDURE — 77080 DXA BONE DENSITY AXIAL: CPT | Mod: TC

## 2019-04-22 PROBLEM — Z00.00 HEALTHCARE MAINTENANCE: Status: RESOLVED | Noted: 2019-01-21 | Resolved: 2019-04-22

## 2019-08-06 NOTE — LETTER
History & Physical       Patient Name: Hudson Pagan Location: [facility]   MRN: [id number]    : 1946    Date of Surgery: 2019      Physician: Burton Mckee MD                     HISTORY:  Pre-op Diagnosis: Screening for Colon cancer  Proposed Surgery: Colonoscopy    HPI:  Hudson Pagan is a 73 year old male is here for endoscopic evaluation for above mentioned reasons.        Past Medical History:   Diagnosis Date   • Acute thrombosis of left internal jugular vein (CMS/HCC) 2016    was on plavix but secondary to angio/pacer. now on lifelong xeralto -   • Ascending aortic aneurysm (CMS/HCC) 2015    4.1 cm per CT   • AVNRT (AV norman re-entry tachycardia) (CMS/HCC) 2006    s/p slow pathway ablation   • CAD (coronary artery disease)     s/p DIAN (Cypher 2.5 x 13 mm) to rPDA 06, cath 2012: diffuse, nonobstructive CAD    • Calculus of gallbladder and bile duct with acute cholecystitis, with obstruction 2019   • Chronic idiopathic gout    • Chronic stable angina (CMS/HCC) 10/23/2017   • Colon polyp     due    • Elevated LFTs 2017   • Gallstone    • Gynecomastia, male 2016    hormonal but exacerbated by PPI. mammo ok     • Hemorrhage of gastrointestinal tract     remotely   • Hereditary and idiopathic peripheral neuropathy 2012   • HLD (hyperlipidemia)    • Lung nodule < 6cm on CT     : Stable scattered subcentimeter nodules in both lungs compared to 10/23/2013 favoring benign etiology.    • Lupus anticoagulant disorder (CMS/HCC) 2016   • Nonspecific abnormal electrocardiogram (ECG) (EKG)    • Nonsustained paroxysmal supraventricular tachycardia (CMS/HCC) 10/23/2017    On device interrogation 2016- low dose toprol    • CB (obstructive sleep apnea) 2015    noncompliant with tx. \"dry mouth\" - declines tx    • Paraesophageal hernia    • Plantar fascial fibromatosis 1999   • Sinus bradycardia 2014   • SSS (sick sinus syndrome)  [facility]  [address], [address]  [facility] [address]  Phone: [phone]  Fax: [phone]    Pt Name: Marsha Chun  Injury Date: 03/22/2018   Employee ID: [id number] Date: 08/02/2018   Company: [employer]            Appointment Time: 2:00 PM Arrived:  2:00 PM CDT   Physician: Vijay Doshi PA-C Time out: 3:19 PM       Office Treatment: Marsha was seen today for ankle pain.    Diagnoses and all orders for this visit:    Sprain of anterior talofibular ligament of left ankle, subsequent encounter  Closed nondisplaced fracture of base of fifth metacarpal bone of right hand with routine healing     Patient Instructions: PT to be scheduled once authorized, Daily home exercises/warm soaks (Continue celebrex daily. 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   • Ascending aortic aneurysm (CMS/HCC)   • Atherosclerosis of coronary artery   • AVNRT (AV norman re-entry tachycardia) (CMS/HCC)   • B12 deficiency   • Bilateral carpal tunnel syndrome   • Chronic stable angina (CMS/HCC)   • Colon polyp   • Gynecomastia, male   • Hereditary and idiopathic peripheral neuropathy   • History of colonic polyps   • History of thrombosis of internal jugular vein   • HLD (hyperlipidemia)   • Hx of long term use of blood thinners   • Hypoglycemia   • Gout   • LA (lupus anticoagulant) disorder (CMS/HCC)   • Nonsustained paroxysmal supraventricular tachycardia (CMS/HCC)   • CB (obstructive sleep apnea)   • Other testicular hypofunction   • Paraesophageal hernia   • Plantar fascial fibromatosis   • Postsurgical percutaneous transluminal coronary angioplasty status   • Rotator cuff syndrome   • Screening PSA (prostate specific antigen)   • Sick sinus syndrome (CMS/HCC)   • Sinus bradycardia   • Urge incontinence   • CB (obstructive sleep apnea)   • Idiopathic peripheral neuropathy   • Biliary sludge   • Abnormal EKG     Current Outpatient Medications   Medication Sig Dispense Refill   • DULoxetine (CYMBALTA) 30 MG capsule TAKE 1 CAPSULE BY MOUTH DAILY FOR 7 DAYS THEN INCREASE TO 60 MG BY MOUTH DAILY 30 capsule 2   • bisacodyl (DULCOLAX) 5 MG EC tablet See Colonoscopy Instructions. 2 tablet 0   • simethicone (MYLICON) 125 MG chewable tablet See Colonoscopy Instructions. 2 tablet 0   • Na Sulfate-K Sulfate-Mg Sulf (SUPREP BOWEL PREP KIT) 17.5-3.13-1.6 GM/177ML Solution See Colonoscopy Instructions. 1 Bottle 0   • XARELTO 20 MG Tab  TAKE 1 TABLET DAILY 90 tablet 0   • DULoxetine (CYMBALTA) 60 MG capsule Take 1 capsule by mouth daily. 90 capsule 0   • metoPROLOL succinate (TOPROL-XL) 25 MG 24 hr tablet TAKE 1/2 TABLET BY MOUTH DAILY 45 tablet 2   • hydroCORTisone (PROCTOZONE-HC) 2.5 % rectal cream APPLY TO THE RECTAL AREA TWICE DAILY TO THREE TIMES DAILY 30 g 0   • nitroGLYcerin (NITROSTAT) 0.4 MG sublingual tablet Place 1 tablet under the tongue every 5 minutes as needed for Chest pain. If using more than 2 please call 911/to ER 30 tablet 0   • allopurinol (ZYLOPRIM) 300 MG tablet Take 1 tablet by mouth daily. 90 tablet 3   • cyanocobalamin, vitamin B12, 1000 MCG lozenge Take 1 lozenge by mouth 3 days a week. 36 lozenge 3   • pravastatin (PRAVACHOL) 20 MG tablet Take 1 tablet by mouth daily. 90 tablet 1   • ranitidine (ZANTAC) 150 MG tablet Take 1 tablet by mouth 2 times daily. 180 tablet 3   • Multiple Vitamins-Minerals (MULTIVITAMIN PO) one a day     • ergocalciferol (DRISDOL) 8000 UNIT/ML solution      • Fiber Powder      • Omega-3 Fatty Acids (FISH OIL) 500 MG capsule        No current facility-administered medications for this visit.      ALLERGIES:   Allergen Reactions   • Simvastatin Other (See Comments)     Myalgias.   • Testosterone RASH        Social History:  Social History     Socioeconomic History   • Marital status: /Civil Union     Spouse name: Not on file   • Number of children: Not on file   • Years of education: Not on file   • Highest education level: Not on file   Occupational History   • Not on file   Social Needs   • Financial resource strain: Not on file   • Food insecurity:     Worry: Not on file     Inability: Not on file   • Transportation needs:     Medical: Not on file     Non-medical: Not on file   Tobacco Use   • Smoking status: Former Smoker     Packs/day: 1.00     Years: 10.00     Pack years: 10.00     Types: Cigarettes     Last attempt to quit: 1972     Years since quittin.6   • Smokeless  tobacco: Never Used   Substance and Sexual Activity   • Alcohol use: Not Currently     Frequency: Never     Comment: quit in 80s and 90s   • Drug use: Not Currently   • Sexual activity: Not Currently   Lifestyle   • Physical activity:     Days per week: Not on file     Minutes per session: Not on file   • Stress: Not on file   Relationships   • Social connections:     Talks on phone: Not on file     Gets together: Not on file     Attends Jew service: Not on file     Active member of club or organization: Not on file     Attends meetings of clubs or organizations: Not on file     Relationship status: Not on file   • Intimate partner violence:     Fear of current or ex partner: Not on file     Emotionally abused: Not on file     Physically abused: Not on file     Forced sexual activity: Not on file   Other Topics Concern   • Not on file   Social History Narrative   • Not on file       PERTINENT REVIEW OF SYSTEMS:   No contributory complaints    PHYSICAL EXAMINATION:  Vitals Signs Stable Yes    Weight There were no vitals filed for this visit.   Mental Status: Awake & alert, O x 3 Yes   HEENT: No Gross lesion noted Yes    Pupils round & equal Yes    No icterus Yes   Heart: Regular rate & rhythm Yes   Lungs: Clear to auscultation Yes   Abdomen: Soft and non-tender Yes   Extremities: No clubbing, cyanosis or edema Yes     Other:       Hudson Pagan is cleared for surgery in the ambulatory setting.       Electronically signed by Burton Mckee MD

## 2019-08-06 NOTE — LETTER
[facility] [place]  [address], [address]  [place] [address]  Phone: [phone]  Fax: [phone]    Pt Name: Marsha Chun  Injury Date: 03/22/2018   Employee ID: [id number] Date: 04/19/2018   Company: [employer]            Appointment Time:10:00 AM Arrived: 11:30 AM CDT   Physician: Vijay Doshi PA-C Time out: 12:22 PM       Office Treatment: Marsha was seen today for ankle pain and hand pain.    Diagnoses and all orders for this visit:    Sprain of anterior talofibular ligament of left ankle, subsequent encounter  -     ibuprofen (ADVIL,MOTRIN) 400 MG tablet; Take 1 tablet (400 mg total) by mouth every 6 (six) hours as needed for Other. Take with food.  Closed nondisplaced fracture of base of fifth metacarpal bone of right hand with routine healing  Sprain of left wrist, subsequent encounter  Fall, subsequent encounter     Patient Instructions: Daily home exercises/warm soaks      Restrictions: Disabled until next office visit       Return Appointment: 4/26/2018 at 10:30 AM       
hives

## 2019-09-17 DIAGNOSIS — Z00.00 HEALTHCARE MAINTENANCE: ICD-10-CM

## 2019-09-17 DIAGNOSIS — E21.0 HYPERPARATHYROIDISM, PRIMARY: Primary | ICD-10-CM

## 2019-09-17 DIAGNOSIS — E78.2 MIXED HYPERLIPIDEMIA: ICD-10-CM

## 2019-09-17 DIAGNOSIS — I10 ESSENTIAL HYPERTENSION: ICD-10-CM

## 2019-09-30 ENCOUNTER — OFFICE VISIT (OUTPATIENT)
Dept: FAMILY MEDICINE | Facility: CLINIC | Age: 74
End: 2019-09-30
Payer: COMMERCIAL

## 2019-09-30 VITALS
BODY MASS INDEX: 30.27 KG/M2 | DIASTOLIC BLOOD PRESSURE: 64 MMHG | OXYGEN SATURATION: 99 % | HEART RATE: 55 BPM | TEMPERATURE: 98 F | WEIGHT: 181.69 LBS | HEIGHT: 65 IN | SYSTOLIC BLOOD PRESSURE: 134 MMHG

## 2019-09-30 DIAGNOSIS — D22.9 NUMEROUS MOLES: ICD-10-CM

## 2019-09-30 DIAGNOSIS — M79.605 LEFT LEG PAIN: ICD-10-CM

## 2019-09-30 DIAGNOSIS — M25.562 ACUTE PAIN OF LEFT KNEE: Primary | ICD-10-CM

## 2019-09-30 DIAGNOSIS — Z00.00 HEALTHCARE MAINTENANCE: ICD-10-CM

## 2019-09-30 PROCEDURE — 99214 OFFICE O/P EST MOD 30 MIN: CPT | Mod: 25,S$GLB,, | Performed by: INTERNAL MEDICINE

## 2019-09-30 PROCEDURE — 1101F PT FALLS ASSESS-DOCD LE1/YR: CPT | Mod: CPTII,S$GLB,, | Performed by: INTERNAL MEDICINE

## 2019-09-30 PROCEDURE — 3075F PR MOST RECENT SYSTOLIC BLOOD PRESS GE 130-139MM HG: ICD-10-PCS | Mod: CPTII,S$GLB,, | Performed by: INTERNAL MEDICINE

## 2019-09-30 PROCEDURE — 96372 THER/PROPH/DIAG INJ SC/IM: CPT | Mod: S$GLB,,, | Performed by: INTERNAL MEDICINE

## 2019-09-30 PROCEDURE — 3078F DIAST BP <80 MM HG: CPT | Mod: CPTII,S$GLB,, | Performed by: INTERNAL MEDICINE

## 2019-09-30 PROCEDURE — 99999 PR PBB SHADOW E&M-EST. PATIENT-LVL IV: CPT | Mod: PBBFAC,,, | Performed by: INTERNAL MEDICINE

## 2019-09-30 PROCEDURE — 1101F PR PT FALLS ASSESS DOC 0-1 FALLS W/OUT INJ PAST YR: ICD-10-PCS | Mod: CPTII,S$GLB,, | Performed by: INTERNAL MEDICINE

## 2019-09-30 PROCEDURE — 99999 PR PBB SHADOW E&M-EST. PATIENT-LVL IV: ICD-10-PCS | Mod: PBBFAC,,, | Performed by: INTERNAL MEDICINE

## 2019-09-30 PROCEDURE — 99214 PR OFFICE/OUTPT VISIT, EST, LEVL IV, 30-39 MIN: ICD-10-PCS | Mod: 25,S$GLB,, | Performed by: INTERNAL MEDICINE

## 2019-09-30 PROCEDURE — 96372 PR INJECTION,THERAP/PROPH/DIAG2ST, IM OR SUBCUT: ICD-10-PCS | Mod: S$GLB,,, | Performed by: INTERNAL MEDICINE

## 2019-09-30 PROCEDURE — 3075F SYST BP GE 130 - 139MM HG: CPT | Mod: CPTII,S$GLB,, | Performed by: INTERNAL MEDICINE

## 2019-09-30 PROCEDURE — 3078F PR MOST RECENT DIASTOLIC BLOOD PRESSURE < 80 MM HG: ICD-10-PCS | Mod: CPTII,S$GLB,, | Performed by: INTERNAL MEDICINE

## 2019-09-30 RX ORDER — TIZANIDINE 2 MG/1
2 TABLET ORAL NIGHTLY PRN
Qty: 20 TABLET | Refills: 0 | Status: SHIPPED | OUTPATIENT
Start: 2019-09-30 | End: 2019-10-20

## 2019-09-30 RX ORDER — KETOROLAC TROMETHAMINE 30 MG/ML
30 INJECTION, SOLUTION INTRAMUSCULAR; INTRAVENOUS
Status: COMPLETED | OUTPATIENT
Start: 2019-09-30 | End: 2019-09-30

## 2019-09-30 RX ADMIN — KETOROLAC TROMETHAMINE 30 MG: 30 INJECTION, SOLUTION INTRAMUSCULAR; INTRAVENOUS at 12:09

## 2019-09-30 NOTE — PROGRESS NOTES
HISTORY OF PRESENT ILLNESS:  Marsha Chun is a 74 y.o. female who presents to the clinic today for Leg Pain (Left Leg)    Left leg pain began about one-two weeks ago - suddenly.  She woke up and pain started suddenly in the morning.  Walks a lot at work at [employer] [place] [employer]\"".  Recalls she may have stepped down from a truck at work and noted some discomfort to knee.  She does not really exercise.  Pain is in medial knee and radiates down to the upper calf.  This knee has not hurt before.  She has some arthritis in the hands.  Has been rubbing a salve on her knee.  Last plane trip was after the pain started when she was on her way to [place] for [place] game.  Not taking any pain medicine at home.    PAST MEDICAL HISTORY:  Past Medical History:   Diagnosis Date    History of TB skin testing     Hyperparathyroidism, unspecified     Hypertension     Osteopenia     Overweight(278.02)        PAST SURGICAL HISTORY:  Past Surgical History:   Procedure Laterality Date    lasik and a rotator cuff surgery      SHOULDER SURGERY         SOCIAL HISTORY:  Social History     Socioeconomic History    Marital status:      Spouse name: Not on file    Number of children: Not on file    Years of education: Not on file    Highest education level: Not on file   Occupational History    Not on file   Social Needs    Financial resource strain: Not on file    Food insecurity:     Worry: Not on file     Inability: Not on file    Transportation needs:     Medical: Not on file     Non-medical: Not on file   Tobacco Use    Smoking status: Never Smoker    Smokeless tobacco: Never Used   Substance and Sexual Activity    Alcohol use: No     Alcohol/week: 0.0 standard drinks    Drug use: No    Sexual activity: Not on file   Lifestyle    Physical activity:     Days per week: Not on file     Minutes per session: Not on file    Stress: Not on file   Relationships    Social connections:     Talks on phone: Not on file      Gets together: Not on file     Attends Advent service: Not on file     Active member of club or organization: Not on file     Attends meetings of clubs or organizations: Not on file     Relationship status: Not on file   Other Topics Concern    Not on file   Social History Narrative    Not on file       FAMILY HISTORY:  Family History   Problem Relation Age of Onset    Stroke Sister     Diabetes Neg Hx     Thyroid cancer Neg Hx     Osteoporosis Neg Hx        ALLERGIES AND MEDICATIONS: updated and reviewed.  Review of patient's allergies indicates:  No Known Allergies  Medication List with Changes/Refills   Current Medications    ATENOLOL-CHLORTHALIDONE (TENORETIC) 50-25 MG TAB    Take 1 tablet by mouth once daily.    ATORVASTATIN (LIPITOR) 10 MG TABLET    Take 1 tablet (10 mg total) by mouth once daily.    CHOLECALCIFEROL, VITAMIN D3, (VITAMIN D3) 5,000 UNIT TAB    Take 5,000 Units by mouth once daily.    DICLOFENAC SODIUM (VOLTAREN) 1 % GEL    Apply 2 g topically 3 (three) times daily.    FISH OIL-OMEGA-3 FATTY ACIDS 300-1,000 MG CAPSULE    Take 2 g by mouth once daily.    IBUPROFEN (ADVIL,MOTRIN) 400 MG TABLET    Take 1 tablet (400 mg total) by mouth every 6 (six) hours as needed for Other. Take with food.    OXYBUTYNIN (DITROPAN) 5 MG TAB    Take 1 tablet (5 mg total) by mouth 2 (two) times daily.    UNABLE TO FIND    medication name: Omega XL          CARE TEAM:  Patient Care Team:  Marcos Sevilla MD as PCP - General (Internal Medicine)         REVIEW OF SYSTEMS:  Review of Systems   Constitutional: Negative for fatigue and fever.   HENT: Negative for congestion and postnasal drip.    Eyes: Negative for photophobia and visual disturbance.   Respiratory: Negative for cough and shortness of breath.    Cardiovascular: Negative for chest pain and palpitations.   Gastrointestinal: Negative for abdominal distention, nausea and vomiting.   Genitourinary: Negative for dysuria and frequency.   Musculoskeletal:  "Negative for back pain. Arthralgias: knee pain.   Neurological: Negative for light-headedness and headaches.   Psychiatric/Behavioral: Negative for dysphoric mood. The patient is not nervous/anxious.          PHYSICAL EXAM:   Vitals:    09/30/19 1127   BP: 134/64   Pulse: (!) 55   Temp: 97.6 °F (36.4 °C)     Weight: 82.4 kg (181 lb 10.5 oz)   Height: 5' 5" (165.1 cm)   Body mass index is 30.23 kg/m².     General appearance - alert, well appearing, and in no distress and obese  Mental status - normal mood, behavior, speech, dress, motor activity, and thought processes  Eyes - sclera anicteric, left eye normal, right eye normal  Chest - no tachypnea, retractions or cyanosis  Neurological - alert, oriented, normal speech, no focal findings or movement disorder noted, motor and sensory grossly normal bilaterally  Musculoskeletal - full range of motion the knees, no effusion, no erythema, no joint instability.  Pain on extension of the knee.  Extremities - no pedal edema noted, intact peripheral pulses      ASSESSMENT AND PLAN:  1. Acute pain of left knee  - Instructed for conservative mgmt for about 4 weeks.  Instructed on rest, ice, compression, elevation along with PRN Aleve or Tylenol for pain.  - If not improved in 4 weeks, r/f to ortho.  - tiZANidine (ZANAFLEX) 2 MG tablet; Take 1 tablet (2 mg total) by mouth nightly as needed.  Dispense: 20 tablet; Refill: 0  - ketorolac injection 30 mg    2. Left leg pain  - rule out DVT  Given pain to calf area as well.  - US Lower Extremity Veins Left; Future    3. Healthcare maintenance  - Case request GI: COLONOSCOPY    4. Numerous moles  - Ambulatory Referral to Dermatology           Follow up in January or sooner as needed.  "

## 2019-10-01 ENCOUNTER — HOSPITAL ENCOUNTER (OUTPATIENT)
Dept: RADIOLOGY | Facility: HOSPITAL | Age: 74
Discharge: HOME | End: 2019-10-01
Attending: INTERNAL MEDICINE
Payer: COMMERCIAL

## 2019-10-01 DIAGNOSIS — M79.605 LEFT LEG PAIN: ICD-10-CM

## 2019-10-01 DIAGNOSIS — Z12.11 COLON CANCER SCREENING: Primary | ICD-10-CM

## 2019-10-01 PROCEDURE — 93971 EXTREMITY STUDY: CPT | Mod: TC,LT

## 2019-10-01 PROCEDURE — 93971 EXTREMITY STUDY: CPT | Mod: 26,LT,, | Performed by: RADIOLOGY

## 2019-10-01 PROCEDURE — 93971 US LOWER EXTREMITY VEINS LEFT: ICD-10-PCS | Mod: 26,LT,, | Performed by: RADIOLOGY

## 2019-10-04 ENCOUNTER — TELEPHONE (OUTPATIENT)
Dept: FAMILY MEDICINE | Facility: CLINIC | Age: 74
End: 2019-10-04

## 2019-10-04 NOTE — TELEPHONE ENCOUNTER
----- Message from Marcos Sevilla MD sent at 10/4/2019  1:12 AM CDT -----  Please call the patient regarding her result.  No blood clot seen.

## 2019-10-04 NOTE — TELEPHONE ENCOUNTER
Called pt regarding results , pt now requesting pain medication for her leg states that MD was to send meds to pharmacy staff advised no medication  seen are sent ,staff will send message to MD about pain meds

## 2019-10-22 ENCOUNTER — OFFICE VISIT (OUTPATIENT)
Dept: FAMILY MEDICINE | Facility: CLINIC | Age: 74
End: 2019-10-22
Payer: COMMERCIAL

## 2019-10-22 VITALS
WEIGHT: 183.63 LBS | BODY MASS INDEX: 30.59 KG/M2 | OXYGEN SATURATION: 98 % | DIASTOLIC BLOOD PRESSURE: 80 MMHG | TEMPERATURE: 99 F | SYSTOLIC BLOOD PRESSURE: 130 MMHG | HEIGHT: 65 IN | RESPIRATION RATE: 17 BRPM | HEART RATE: 78 BPM

## 2019-10-22 DIAGNOSIS — Z00.00 HEALTHCARE MAINTENANCE: ICD-10-CM

## 2019-10-22 DIAGNOSIS — S63.005D: Primary | ICD-10-CM

## 2019-10-22 PROBLEM — S63.005A: Status: ACTIVE | Noted: 2019-10-22

## 2019-10-22 PROCEDURE — 99999 PR PBB SHADOW E&M-EST. PATIENT-LVL V: CPT | Mod: PBBFAC,,, | Performed by: INTERNAL MEDICINE

## 2019-10-22 PROCEDURE — 99999 PR PBB SHADOW E&M-EST. PATIENT-LVL V: ICD-10-PCS | Mod: PBBFAC,,, | Performed by: INTERNAL MEDICINE

## 2019-10-22 PROCEDURE — 3075F PR MOST RECENT SYSTOLIC BLOOD PRESS GE 130-139MM HG: ICD-10-PCS | Mod: CPTII,S$GLB,, | Performed by: INTERNAL MEDICINE

## 2019-10-22 PROCEDURE — 3075F SYST BP GE 130 - 139MM HG: CPT | Mod: CPTII,S$GLB,, | Performed by: INTERNAL MEDICINE

## 2019-10-22 PROCEDURE — 99214 PR OFFICE/OUTPT VISIT, EST, LEVL IV, 30-39 MIN: ICD-10-PCS | Mod: S$GLB,,, | Performed by: INTERNAL MEDICINE

## 2019-10-22 PROCEDURE — 1101F PT FALLS ASSESS-DOCD LE1/YR: CPT | Mod: CPTII,S$GLB,, | Performed by: INTERNAL MEDICINE

## 2019-10-22 PROCEDURE — 1101F PR PT FALLS ASSESS DOC 0-1 FALLS W/OUT INJ PAST YR: ICD-10-PCS | Mod: CPTII,S$GLB,, | Performed by: INTERNAL MEDICINE

## 2019-10-22 PROCEDURE — 3079F PR MOST RECENT DIASTOLIC BLOOD PRESSURE 80-89 MM HG: ICD-10-PCS | Mod: CPTII,S$GLB,, | Performed by: INTERNAL MEDICINE

## 2019-10-22 PROCEDURE — 99214 OFFICE O/P EST MOD 30 MIN: CPT | Mod: S$GLB,,, | Performed by: INTERNAL MEDICINE

## 2019-10-22 PROCEDURE — 3079F DIAST BP 80-89 MM HG: CPT | Mod: CPTII,S$GLB,, | Performed by: INTERNAL MEDICINE

## 2019-10-22 RX ORDER — IBUPROFEN 800 MG/1
800 TABLET ORAL EVERY 8 HOURS PRN
Qty: 20 TABLET | Refills: 0 | Status: SHIPPED | OUTPATIENT
Start: 2019-10-22 | End: 2021-02-09 | Stop reason: SDUPTHER

## 2019-10-22 NOTE — PROGRESS NOTES
HISTORY OF PRESENT ILLNESS:  Marsha Chun is a 74 y.o. female who presents to the clinic today for No chief complaint on file.    Upcoming colonoscopy on 10/29/19    Was in [place] for the [employer] game and fell on an uneven side walk onto both hands at hotel.  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  Had Xray, CT scan of head and of upper extremity.  She bumped her forehead on the sidewalk.  She has brought her imaging CD today.  Taking ibuprofen 800 mg up to once in a day.    The 10-year ASCVD risk score (Pilar JAMES Jr., et al., 2013) is: 17.6%    Values used to calculate the score:      Age: 74 years      Sex: Female      Is Non- : Yes      Diabetic: No      Tobacco smoker: No      Systolic Blood Pressure: 130 mmHg      Is BP treated: Yes      HDL Cholesterol: 87 mg/dL      Total Cholesterol: 242 mg/dL    Does not wish to take statin at this time and would like to modify lifestyle - reassess in January.    PAST MEDICAL HISTORY:  Past Medical History:   Diagnosis Date    History of TB skin testing     Hyperparathyroidism, unspecified     Hypertension     Osteopenia     Overweight(278.02)        PAST SURGICAL HISTORY:  Past Surgical History:   Procedure Laterality Date    lasik and a rotator cuff surgery      SHOULDER SURGERY         SOCIAL HISTORY:  Social History     Socioeconomic History    Marital status:      Spouse name: Not on file    Number of children: Not on file    Years of education: Not on file    Highest education level: Not on file   Occupational History    Not on file   Social Needs    Financial resource strain: Not on file    Food insecurity:     Worry: Not on file     Inability: Not on file    Transportation needs:     Medical: Not on file     Non-medical: Not on file   Tobacco Use    Smoking status: Never Smoker    Smokeless tobacco: Never Used   Substance and Sexual Activity    Alcohol use: No      Alcohol/week: 0.0 standard drinks    Drug use: No    Sexual activity: Not on file   Lifestyle    Physical activity:     Days per week: Not on file     Minutes per session: Not on file    Stress: Not on file   Relationships    Social connections:     Talks on phone: Not on file     Gets together: Not on file     Attends Quaker service: Not on file     Active member of club or organization: Not on file     Attends meetings of clubs or organizations: Not on file     Relationship status: Not on file   Other Topics Concern    Not on file   Social History Narrative    Not on file       FAMILY HISTORY:  Family History   Problem Relation Age of Onset    Stroke Sister     Diabetes Neg Hx     Thyroid cancer Neg Hx     Osteoporosis Neg Hx        ALLERGIES AND MEDICATIONS: updated and reviewed.  Review of patient's allergies indicates:  No Known Allergies  Medication List with Changes/Refills   Current Medications    ATENOLOL-CHLORTHALIDONE (TENORETIC) 50-25 MG TAB    Take 1 tablet by mouth once daily.    ATORVASTATIN (LIPITOR) 10 MG TABLET    Take 1 tablet (10 mg total) by mouth once daily.    CHOLECALCIFEROL, VITAMIN D3, (VITAMIN D3) 5,000 UNIT TAB    Take 5,000 Units by mouth once daily.    DICLOFENAC SODIUM (VOLTAREN) 1 % GEL    Apply 2 g topically 3 (three) times daily.    FISH OIL-OMEGA-3 FATTY ACIDS 300-1,000 MG CAPSULE    Take 2 g by mouth once daily.    IBUPROFEN (ADVIL,MOTRIN) 400 MG TABLET    Take 1 tablet (400 mg total) by mouth every 6 (six) hours as needed for Other. Take with food.    OXYBUTYNIN (DITROPAN) 5 MG TAB    Take 1 tablet (5 mg total) by mouth 2 (two) times daily.    POLYETHYLENE GLYCOL (COLYTE) 240-22.72-6.72 -5.84 GRAM SOLR    Take 4,000 mLs (4 L total) by mouth once. for 1 dose    UNABLE TO FIND    medication name: Omega XL          CARE TEAM:  Patient Care Team:  aMrcos Sevilla MD as PCP - General (Internal Medicine)  Nisa Dunaway MA as Care Coordinator         REVIEW OF  SYSTEMS:  Review of Systems   Constitutional: Negative for fatigue and fever.   HENT: Negative for congestion and postnasal drip.    Eyes: Negative for photophobia and visual disturbance.   Respiratory: Negative for cough and shortness of breath.    Cardiovascular: Negative for chest pain.   Gastrointestinal: Negative for abdominal pain, nausea and vomiting.   Genitourinary: Negative for dysuria and frequency.   Musculoskeletal: Negative for back pain. Arthralgias: left wrist pain.   Neurological: Negative for light-headedness and headaches.   Psychiatric/Behavioral: Negative for dysphoric mood. The patient is not nervous/anxious.          PHYSICAL EXAM:   Vitals:    10/22/19 0918   BP: 130/80   Pulse: 78   Resp: 17   Temp: 98.6 °F (37 °C)             Body mass index is 30.56 kg/m².     General appearance - alert, well appearing, and in no distress and obese  Mental status - normal mood, behavior, speech, dress, motor activity, and thought processes  Eyes - sclera anicteric, left eye normal, right eye normal  Chest - clear to auscultation, no wheezes, rales or rhonchi, symmetric air entry  Heart - normal rate and regular rhythm  Musculoskeletal - left wrist splinted in sling  Extremities - no pedal edema noted, intact peripheral pulses      ASSESSMENT AND PLAN:  1. Dislocation of left wrist, subsequent encounter  - To be seen this PM in ortho clinic  - ibuprofen (ADVIL,MOTRIN) 800 MG tablet; Take 1 tablet (800 mg total) by mouth every 8 (eight) hours as needed for Pain.  Dispense: 20 tablet; Refill: 0  - Ambulatory referral/consult to Orthopedics; Future    2. Healthcare maintenance  - re assess lipids in 3 months and discuss possibility of statin at that time.  - Ambulatory Referral to Obstetrics / Gynecology         Follow up 3 months or sooner as needed.

## 2019-10-29 ENCOUNTER — ANESTHESIA (OUTPATIENT)
Dept: ENDOSCOPY | Facility: HOSPITAL | Age: 74
End: 2019-10-29
Payer: COMMERCIAL

## 2019-10-29 ENCOUNTER — HOSPITAL ENCOUNTER (OUTPATIENT)
Facility: HOSPITAL | Age: 74
Discharge: HOME | End: 2019-10-29
Attending: INTERNAL MEDICINE | Admitting: INTERNAL MEDICINE
Payer: COMMERCIAL

## 2019-10-29 ENCOUNTER — ANESTHESIA EVENT (OUTPATIENT)
Dept: ENDOSCOPY | Facility: HOSPITAL | Age: 74
End: 2019-10-29
Payer: COMMERCIAL

## 2019-10-29 ENCOUNTER — TELEPHONE (OUTPATIENT)
Dept: ADMINISTRATIVE | Facility: HOSPITAL | Age: 74
End: 2019-10-29

## 2019-10-29 VITALS
SYSTOLIC BLOOD PRESSURE: 165 MMHG | HEIGHT: 65 IN | WEIGHT: 172 LBS | RESPIRATION RATE: 18 BRPM | TEMPERATURE: 98 F | BODY MASS INDEX: 28.66 KG/M2 | OXYGEN SATURATION: 96 % | DIASTOLIC BLOOD PRESSURE: 89 MMHG | HEART RATE: 69 BPM

## 2019-10-29 DIAGNOSIS — Z12.11 COLON CANCER SCREENING: ICD-10-CM

## 2019-10-29 PROBLEM — K57.90 DIVERTICULOSIS: Status: ACTIVE | Noted: 2019-10-29

## 2019-10-29 PROCEDURE — G0121 COLON CA SCRN NOT HI RSK IND: HCPCS | Mod: ,,, | Performed by: INTERNAL MEDICINE

## 2019-10-29 PROCEDURE — G0121 COLON CA SCRN NOT HI RSK IND: ICD-10-PCS | Mod: ,,, | Performed by: INTERNAL MEDICINE

## 2019-10-29 PROCEDURE — 63600175 PHARM REV CODE 636 W HCPCS: Performed by: NURSE ANESTHETIST, CERTIFIED REGISTERED

## 2019-10-29 PROCEDURE — G0121 COLON CA SCRN NOT HI RSK IND: HCPCS | Performed by: INTERNAL MEDICINE

## 2019-10-29 PROCEDURE — 63600175 PHARM REV CODE 636 W HCPCS: Performed by: ANESTHESIOLOGY

## 2019-10-29 PROCEDURE — 37000008 HC ANESTHESIA 1ST 15 MINUTES: Performed by: INTERNAL MEDICINE

## 2019-10-29 PROCEDURE — D9220A PRA ANESTHESIA: ICD-10-PCS | Mod: ANES,,, | Performed by: ANESTHESIOLOGY

## 2019-10-29 PROCEDURE — D9220A PRA ANESTHESIA: ICD-10-PCS | Mod: CRNA,,, | Performed by: NURSE ANESTHETIST, CERTIFIED REGISTERED

## 2019-10-29 PROCEDURE — D9220A PRA ANESTHESIA: Mod: ANES,,, | Performed by: ANESTHESIOLOGY

## 2019-10-29 PROCEDURE — D9220A PRA ANESTHESIA: Mod: CRNA,,, | Performed by: NURSE ANESTHETIST, CERTIFIED REGISTERED

## 2019-10-29 PROCEDURE — 37000009 HC ANESTHESIA EA ADD 15 MINS: Performed by: INTERNAL MEDICINE

## 2019-10-29 RX ORDER — LIDOCAINE HCL/PF 100 MG/5ML
SYRINGE (ML) INTRAVENOUS
Status: DISCONTINUED | OUTPATIENT
Start: 2019-10-29 | End: 2019-10-29

## 2019-10-29 RX ORDER — LIDOCAINE HYDROCHLORIDE 10 MG/ML
1 INJECTION, SOLUTION EPIDURAL; INFILTRATION; INTRACAUDAL; PERINEURAL ONCE
Status: CANCELLED | OUTPATIENT
Start: 2019-10-29 | End: 2019-10-29

## 2019-10-29 RX ORDER — PROPOFOL 10 MG/ML
VIAL (ML) INTRAVENOUS
Status: DISCONTINUED | OUTPATIENT
Start: 2019-10-29 | End: 2019-10-29

## 2019-10-29 RX ORDER — PROPOFOL 10 MG/ML
VIAL (ML) INTRAVENOUS
Status: COMPLETED
Start: 2019-10-29 | End: 2019-10-29

## 2019-10-29 RX ORDER — HYDRALAZINE HYDROCHLORIDE 20 MG/ML
INJECTION INTRAMUSCULAR; INTRAVENOUS
Status: DISCONTINUED
Start: 2019-10-29 | End: 2019-10-29 | Stop reason: HOSPADM

## 2019-10-29 RX ORDER — LIDOCAINE HYDROCHLORIDE 20 MG/ML
INJECTION, SOLUTION EPIDURAL; INFILTRATION; INTRACAUDAL; PERINEURAL
Status: DISCONTINUED
Start: 2019-10-29 | End: 2019-10-29 | Stop reason: HOSPADM

## 2019-10-29 RX ORDER — HYDRALAZINE HYDROCHLORIDE 20 MG/ML
INJECTION INTRAMUSCULAR; INTRAVENOUS
Status: DISCONTINUED | OUTPATIENT
Start: 2019-10-29 | End: 2019-10-29

## 2019-10-29 RX ORDER — SODIUM CHLORIDE 9 MG/ML
INJECTION, SOLUTION INTRAVENOUS CONTINUOUS
Status: DISCONTINUED | OUTPATIENT
Start: 2019-10-29 | End: 2019-10-29 | Stop reason: HOSPADM

## 2019-10-29 RX ADMIN — PROPOFOL 50 MG: 10 INJECTION, EMULSION INTRAVENOUS at 01:10

## 2019-10-29 RX ADMIN — PROPOFOL 100 MG: 10 INJECTION, EMULSION INTRAVENOUS at 01:10

## 2019-10-29 RX ADMIN — LIDOCAINE HYDROCHLORIDE 100 MG: 20 INJECTION, SOLUTION INTRAVENOUS at 01:10

## 2019-10-29 RX ADMIN — PROPOFOL 30 MG: 10 INJECTION, EMULSION INTRAVENOUS at 01:10

## 2019-10-29 RX ADMIN — HYDRALAZINE HYDROCHLORIDE 10 MG: 20 INJECTION INTRAMUSCULAR; INTRAVENOUS at 01:10

## 2019-10-29 RX ADMIN — SODIUM CHLORIDE: 0.9 INJECTION, SOLUTION INTRAVENOUS at 01:10

## 2019-10-29 NOTE — H&P
"Pt presents for colonoscopy    Patient Vitals for the past 24 hrs:   BP Temp Temp src Pulse Resp SpO2 Height Weight   10/29/19 1237 (!) 189/90 98.2 °F (36.8 °C) Oral 76 18 98 % 5' 5" (1.651 m) 78 kg (172 lb)       Physical Exam:  Gen - Well developed, well nourished, no apparent distress  HEENT - Anicteric  CV - S1, S2, no murmurs/rubs  Lungs - CTA-B, normal excursion  Abd - Soft, NT, ND, normal BS's, no HSM.  Ext - No c/c/e  Neuro - No asterixis      Assessment:  This patient is a 74 y.o. female here for crc screening    Recommendations:  Colonoscopy  "

## 2019-10-29 NOTE — ANESTHESIA POSTPROCEDURE EVALUATION
Anesthesia Post Evaluation    Patient: Marsha Chun    Procedure(s) Performed: Procedure(s) (LRB):  COLONOSCOPY (N/A)    Final Anesthesia Type: general  Patient location during evaluation: GI PACU  Patient participation: Yes- Able to Participate  Level of consciousness: awake and alert and oriented  Post-procedure vital signs: reviewed and stable  Pain management: adequate  Airway patency: patent  PONV status at discharge: No PONV  Anesthetic complications: no      Cardiovascular status: hemodynamically stable and blood pressure returned to baseline  Respiratory status: spontaneous ventilation, room air and unassisted  Hydration status: euvolemic  Follow-up not needed.          Vitals Value Taken Time   /89 10/29/2019  2:24 PM   Temp 36.4 °C (97.5 °F) 10/29/2019  1:54 PM   Pulse 69 10/29/2019  2:24 PM   Resp 18 10/29/2019  2:24 PM   SpO2 96 % 10/29/2019  2:24 PM         Event Time     Out of Recovery 14:31:22          Pain/Vee Score: Vee Score: 10 (10/29/2019  2:24 PM)

## 2019-10-29 NOTE — PROVATION PATIENT INSTRUCTIONS
Discharge Summary/Instructions after an Endoscopic Procedure  Patient Name: Marsha Chun  Patient MRN: [id number]  Patient YOB: 1945  Tuesday, October 29, 2019  Amy Alex MD  RESTRICTIONS:  During your procedure today, you received medications for sedation.  These   medications may affect your judgment, balance and coordination.  Therefore,   for 24 hours, you have the following restrictions:   - DO NOT drive a car, operate machinery, make legal/financial decisions,   sign important papers or drink alcohol.    ACTIVITY:  Today: no heavy lifting, straining or running due to procedural   sedation/anesthesia.  The following day: return to full activity including work.  DIET:  Eat and drink normally unless instructed otherwise.     TREATMENT FOR COMMON SIDE EFFECTS:  - Mild abdominal pain, nausea, belching, bloating or excessive gas:  rest,   eat lightly and use a heating pad.  - Sore Throat: treat with throat lozenges and/or gargle with warm salt   water.  - Because air was used during the procedure, expelling large amounts of air   from your rectum or belching is normal.  - If a bowel prep was taken, you may not have a bowel movement for 1-3 days.    This is normal.  SYMPTOMS TO WATCH FOR AND REPORT TO YOUR PHYSICIAN:  1. Abdominal pain or bloating, other than gas cramps.  2. Chest pain.  3. Back pain.  4. Signs of infection such as: chills or fever occurring within 24 hours   after the procedure.  5. Rectal bleeding, which would show as bright red, maroon, or black stools.   (A tablespoon of blood from the rectum is not serious, especially if   hemorrhoids are present.)  6. Vomiting.  7. Weakness or dizziness.  GO DIRECTLY TO THE NEAREST EMERGENCY ROOM IF YOU HAVE ANY OF THE FOLLOWING:      Difficulty breathing              Chills and/or fever over 101 F   Persistent vomiting and/or vomiting blood   Severe abdominal pain   Severe chest pain   Black, tarry stools   Bleeding- more than one  tablespoon   Any other symptom or condition that you feel may need urgent attention  Your doctor recommends these additional instructions:  If any biopsies were taken, your doctors clinic will contact you in 1 to 2   weeks with any results.  - Discharge patient to home.   - Resume previous diet.   - Continue present medications.   - Await pathology results.   - Repeat colonoscopy in 10 years for screening purposes.  For questions, problems or results please call your physician - Amy Alex MD at Work:  [phone].  [facility] can be reached at [phone]     IF A COMPLICATION OR EMERGENCY SITUATION ARISES AND YOU ARE UNABLE TO REACH   YOUR PHYSICIAN - GO DIRECTLY TO THE EMERGENCY ROOM.  MD Amy De La Cruz MD  10/29/2019 1:56:46 PM  This report has been verified and signed electronically.  PROVATION

## 2019-10-29 NOTE — TRANSFER OF CARE
"Anesthesia Transfer of Care Note    Patient: Marsha Chun    Procedure(s) Performed: Procedure(s) (LRB):  COLONOSCOPY (N/A)    Patient location: GI    Anesthesia Type: general    Transport from OR: Transported from OR on room air with adequate spontaneous ventilation    Post pain: adequate analgesia    Post assessment: no apparent anesthetic complications    Post vital signs: stable    Level of consciousness: awake, alert and oriented    Nausea/Vomiting: no nausea/vomiting    Complications: none    Transfer of care protocol was followed      Last vitals:   Visit Vitals  BP (!) 153/73   Pulse 83   Temp 36.4 °C (97.5 °F)   Resp 16   Ht 5' 5" (1.651 m)   Wt 78 kg (172 lb)   SpO2 96%   Breastfeeding? No   BMI 28.62 kg/m²     "

## 2019-10-29 NOTE — DISCHARGE INSTRUCTIONS
Eating a High-Fiber Diet  Fiber is what gives strength and structure to plants. Most grains, beans, vegetables, and fruits contain fiber. Foods rich in fiber are often low in calories and fat, and they fill you up more. They may also reduce your risks for certain health problems. To find out the amount of fiber in canned, packaged, or frozen foods, read the Nutrition Facts label. It tells you how much fiber is in a serving.    Types of fiber and their benefits  There are two types of fiber: insoluble and soluble. They both aid digestion and help you maintain a healthy weight.  · Insoluble fiber. This is found in whole grains, cereals, certain fruits and vegetables such as apple skin, corn, and carrots. Insoluble fiber may prevent constipation and reduce the risk for certain types of cancer.  · Soluble fiber. This type of fiber is in oats, beans, and certain fruits and vegetables such as strawberries and peas. Soluble fiber can reduce cholesterol, which may help lower the risk for heart disease. It also helps control blood sugar levels.  Look for high-fiber foods  Try these foods to add fiber to your diet:  · Whole-grain breads and cereals. Try to eat 6 to 8 ounces a day. Include wheat and oat bran cereals, whole-wheat muffins or toast, and corn tortillas in your meals.  · Fruits. Try to eat 2 cups a day. Apples, oranges, strawberries, pears, and bananas are good sources. (Note: Fruit juice is low in fiber.)  · Vegetables. Try to eat at least 2.5 cups a day. Add asparagus, carrots, broccoli, peas, and corn to your meals.  · Beans. One cup of cooked lentils gives you over 15 grams of fiber. Try navy beans, lentils, and chickpeas.  · Seeds. A small handful of seeds gives you about 3 grams of fiber. Try sunflower seeds.  Keep track of your fiber  Keep track of how much fiber you eat. Start by reading food labels. Then eat a variety of foods high in fiber. As you begin to eat more fiber, ask your healthcare provider  how much water you should be drinking to keep your digestive system working smoothly.  You should aim for a certain amount of fiber in your diet each day. If you are a woman, that amount is between 25 and 28 grams per day. Men should aim for 30 to 33 grams per day. After age 50, your daily fiber needs drop to 22 grams for women and 28 grams for men.  Before you reach for the fiber supplements, think about this. Fiber is found naturally in healthy whole foods. It gives you that feeling of fullness after you eat. Taking fiber supplements or eating fiber-enriched foods will not give you this full feeling.  Your fiber intake is a good measure for the quality of your overall diet. If you are missing out on your daily amount of fiber, you may be lacking other important nutrients as well.  Date Last Reviewed: 5/11/2015 © 2000-2017 [employer]. [address] [place] [address]. All rights reserved. This information is not intended as a substitute for professional medical care. Always follow your healthcare professional's instructions.        Understanding Diverticulosis and Diverticulitis     Pouches or diverticula usually occur in the lower part of the colon called the sigmoid.     The colon (large intestine) is the last part of the digestive tract. It absorbs water from stool and changes it from a liquid to a solid. In certain cases, small pouches called diverticula can form in the colon wall. This condition is called diverticulosis. The pouches can become infected. If this happens, it becomes a more serious problem called diverticulitis. These problems can be painful. But they can be managed.  Managing your condition  Diet changes or medicines may be prescribed.   If you have diverticulosis  Recommendations include:  · Diet changes are often enough to control symptoms. The main changes are adding fiber (roughage) and drinking more water. Fiber absorbs water as it travels through your colon. This  helps your stool stay soft and move smoothly. Water helps this process.  · If needed, you may be told to take over-the-counter stool softeners.  · To help relieve pain, antispasmodic medicines may be prescribed.  · Watch for changes in your bowel movements. Tell the healthcare provider if you notice any changes.  · Begin an exercise program. Ask your healthcare provider how to get started.  · Get plenty of rest and sleep.   If you have diverticulitis  Treatment depends on how bad your symptoms are.  · For mild symptoms. You may be put on a liquid diet for a short time. Antibiotics are usually prescribed. If these two steps relieve your symptoms, you may then be prescribed a high-fiber diet. If you still have symptoms, your healthcare provider will discuss more treatment choices with you.  · For severe symptoms. You may need to be admitted to the hospital. There, you can be given IV antibiotics and fluids. You will also be put on a low-fiber or liquid diet. Although not common, surgery is needed in some people with severe symptoms.  [place] to colon health     Diverticulitis occurs when the pouches become infected or inflamed.     Help keep your colon healthy with a diet that includes plenty of high-fiber fruits, vegetables, and whole grains. Drink plenty of liquids like water and juice. Maintain a healthy lifestyle including regular exercise, stress management, and adequate rest and sleep.   Date Last Reviewed: 7/1/2016  © 1832-6964 [employer]. [address] [place] [address], [place] [address]. All rights reserved. This information is not intended as a substitute for professional medical care. Always follow your healthcare professional's instructions.

## 2019-10-29 NOTE — ANESTHESIA PREPROCEDURE EVALUATION
10/29/2019  Marsha Chun is a 74 y.o., female.    Pre-operative evaluation for Procedure(s) (LRB):  COLONOSCOPY (N/A)    Marsha Chun is a 74 y.o. female     Denies CP/SOB/GERD/MI/CVA/URI symptoms.  METS > 4  NPO > 8    Patient Active Problem List   Diagnosis    Hypertension    History of transient ischemic attack (TIA)    Hyperparathyroidism, primary    Osteopenia    Hyperparathyroidism, unspecified    Closed nondisplaced fracture of base of fifth metacarpal bone of right hand with routine healing    Sprain of left wrist    Sprain of anterior talofibular ligament of left ankle    Joint stiffness    Joint pain    Weakness    OAB (overactive bladder)    Breast cancer screening    Screen for STD (sexually transmitted disease)    Mixed hyperlipidemia    Obesity (BMI 30.0-34.9)    Acute pain of left knee    Left leg pain    Healthcare maintenance    Numerous moles    Dislocation of left wrist       Review of patient's allergies indicates:  No Known Allergies    No current facility-administered medications on file prior to encounter.      Current Outpatient Medications on File Prior to Encounter   Medication Sig Dispense Refill    atenolol-chlorthalidone (TENORETIC) 50-25 mg Tab Take 1 tablet by mouth once daily. 90 tablet 3    atorvastatin (LIPITOR) 10 MG tablet Take 1 tablet (10 mg total) by mouth once daily. 90 tablet 3    cholecalciferol, vitamin D3, (VITAMIN D3) 5,000 unit Tab Take 5,000 Units by mouth once daily.      diclofenac sodium (VOLTAREN) 1 % Gel Apply 2 g topically 3 (three) times daily. 1 Tube 1    fish oil-omega-3 fatty acids 300-1,000 mg capsule Take 2 g by mouth once daily.      oxybutynin (DITROPAN) 5 MG Tab Take 1 tablet (5 mg total) by mouth 2 (two) times daily. 360 tablet 0    UNABLE TO FIND medication name: Omega XL         Past Surgical History:    Procedure Laterality Date    lasik and a rotator cuff surgery      SHOULDER SURGERY         Social History     Socioeconomic History    Marital status:      Spouse name: Not on file    Number of children: Not on file    Years of education: Not on file    Highest education level: Not on file   Occupational History    Not on file   Social Needs    Financial resource strain: Not on file    Food insecurity:     Worry: Not on file     Inability: Not on file    Transportation needs:     Medical: Not on file     Non-medical: Not on file   Tobacco Use    Smoking status: Never Smoker    Smokeless tobacco: Never Used   Substance and Sexual Activity    Alcohol use: No     Alcohol/week: 0.0 standard drinks    Drug use: No    Sexual activity: Not on file   Lifestyle    Physical activity:     Days per week: Not on file     Minutes per session: Not on file    Stress: Not on file   Relationships    Social connections:     Talks on phone: Not on file     Gets together: Not on file     Attends Oriental orthodox service: Not on file     Active member of club or organization: Not on file     Attends meetings of clubs or organizations: Not on file     Relationship status: Not on file   Other Topics Concern    Not on file   Social History Narrative    Not on file         CBC: No results for input(s): WBC, RBC, HGB, HCT, PLT, MCV, MCH, MCHC in the last 72 hours.    CMP: No results for input(s): NA, K, CL, CO2, BUN, CREATININE, GLU, MG, PHOS, CALCIUM, ALBUMIN, PROT, ALKPHOS, ALT, AST, BILITOT in the last 72 hours.    INR  No results for input(s): PT, INR, PROTIME, APTT in the last 72 hours.      There were no vitals filed for this visit.  See nursing charting for additional vital signs      Diagnostic Studies:  Results for MARILYN BRITO (MRN [id number]) as of 10/29/2019 09:14   Ref. Range 1/21/2019 09:32   WBC Latest Ref Range: 3.90 - 12.70 K/uL 7.84   RBC Latest Ref Range: 4.00 - 5.40 M/uL 5.27   Hemoglobin  Latest Ref Range: 12.0 - 16.0 g/dL 13.9   Hematocrit Latest Ref Range: 37.0 - 48.5 % 39.6   MCV Latest Ref Range: 82 - 98 fL 75 (L)   MCH Latest Ref Range: 27.0 - 31.0 pg 26.4 (L)   MCHC Latest Ref Range: 32.0 - 36.0 g/dL 35.1   RDW Latest Ref Range: 11.5 - 14.5 % 14.8 (H)   Platelets Latest Ref Range: 150 - 350 K/uL 224   MPV Latest Ref Range: 9.2 - 12.9 fL 11.7   Gran% Latest Ref Range: 38.0 - 73.0 % 66.7   Gran # (ANC) Latest Ref Range: 1.8 - 7.7 K/uL 5.2   Lymph% Latest Ref Range: 18.0 - 48.0 % 24.9   Lymph # Latest Ref Range: 1.0 - 4.8 K/uL 2.0   Mono% Latest Ref Range: 4.0 - 15.0 % 6.5   Mono # Latest Ref Range: 0.3 - 1.0 K/uL 0.5   Eosinophil% Latest Ref Range: 0.0 - 8.0 % 1.4   Eos # Latest Ref Range: 0.0 - 0.5 K/uL 0.1   Basophil% Latest Ref Range: 0.0 - 1.9 % 0.5   Baso # Latest Ref Range: 0.00 - 0.20 K/uL 0.04   Differential Method Unknown Automated   Sodium Latest Ref Range: 136 - 145 mmol/L 138   Potassium Latest Ref Range: 3.5 - 5.1 mmol/L 4.2   Chloride Latest Ref Range: 95 - 110 mmol/L 102   CO2 Latest Ref Range: 23 - 29 mmol/L 28   Anion Gap Latest Ref Range: 8 - 16 mmol/L 8   BUN, Bld Latest Ref Range: 8 - 23 mg/dL 18   Creatinine Latest Ref Range: 0.5 - 1.4 mg/dL 0.8   eGFR if non African American Latest Ref Range: >60 mL/min/1.73 m^2 >60   eGFR if  Latest Ref Range: >60 mL/min/1.73 m^2 >60   Glucose Latest Ref Range: 70 - 110 mg/dL 76   Calcium Latest Ref Range: 8.7 - 10.5 mg/dL 9.8   Alkaline Phosphatase Latest Ref Range: 55 - 135 U/L 157 (H)   PROTEIN TOTAL Latest Ref Range: 6.0 - 8.4 g/dL 7.4   Albumin Latest Ref Range: 3.5 - 5.2 g/dL 3.9   BILIRUBIN TOTAL Latest Ref Range: 0.1 - 1.0 mg/dL 0.7     EKG (9/10/18):  Sinus rhythm with Premature supraventricular complexes  Possible Left atrial enlargement  Left axis deviation  LVH  Nonspecific T wave abnormality    Anesthesia Evaluation    I have reviewed the Patient Summary Reports.    I have reviewed the Nursing Notes.      Review  of Systems  Anesthesia Hx:  No problems with previous Anesthesia   Denies Personal Hx of Anesthesia complications.   Social:  No Alcohol Use, Non-Smoker    Cardiovascular:   Exercise tolerance: good Hypertension, well controlled hyperlipidemia    Pulmonary:  Pulmonary Normal    Hepatic/GI:  Hepatic/GI Normal    Neurological:   TIA,    Endocrine:   hyperparathyroid       Physical Exam  General:  Obesity    Airway/Jaw/Neck:   MP3, TMD >3FB, Teeth intact     Chest/Lungs:  Chest/Lungs Clear    Heart/Vascular:  Heart Findings: Normal            Anesthesia Plan  Type of Anesthesia, risks & benefits discussed:  Anesthesia Type:  general, MAC  Patient's Preference:   Intra-op Monitoring Plan: standard ASA monitors  Intra-op Monitoring Plan Comments:   Post Op Pain Control Plan: multimodal analgesia and per primary service following discharge from PACU  Post Op Pain Control Plan Comments:   Induction:    Beta Blocker:  Patient is on a Beta-Blocker and has received one dose within the past 24 hours (No further documentation required).       Informed Consent: Patient understands risks and agrees with Anesthesia plan.  Questions answered.   ASA Score: 3     Day of Surgery Review of History & Physical:  There are no significant changes.          Ready For Surgery From Anesthesia Perspective.

## 2019-11-04 ENCOUNTER — PATIENT OUTREACH (OUTPATIENT)
Dept: ADMINISTRATIVE | Facility: OTHER | Age: 74
End: 2019-11-04

## 2019-11-10 ENCOUNTER — PATIENT OUTREACH (OUTPATIENT)
Dept: ADMINISTRATIVE | Facility: OTHER | Age: 74
End: 2019-11-10

## 2019-11-12 ENCOUNTER — OFFICE VISIT (OUTPATIENT)
Dept: OBSTETRICS AND GYNECOLOGY | Facility: CLINIC | Age: 74
End: 2019-11-12
Payer: COMMERCIAL

## 2019-11-12 VITALS
DIASTOLIC BLOOD PRESSURE: 84 MMHG | HEIGHT: 65 IN | WEIGHT: 182.56 LBS | SYSTOLIC BLOOD PRESSURE: 136 MMHG | BODY MASS INDEX: 30.42 KG/M2

## 2019-11-12 DIAGNOSIS — Z12.39 SCREENING BREAST EXAMINATION: ICD-10-CM

## 2019-11-12 DIAGNOSIS — Z01.419 ENCOUNTER FOR GYNECOLOGICAL EXAMINATION WITHOUT ABNORMAL FINDING: Primary | ICD-10-CM

## 2019-11-12 PROCEDURE — 3075F SYST BP GE 130 - 139MM HG: CPT | Mod: CPTII,S$GLB,, | Performed by: OBSTETRICS & GYNECOLOGY

## 2019-11-12 PROCEDURE — 3079F DIAST BP 80-89 MM HG: CPT | Mod: CPTII,S$GLB,, | Performed by: OBSTETRICS & GYNECOLOGY

## 2019-11-12 PROCEDURE — 99999 PR PBB SHADOW E&M-EST. PATIENT-LVL III: ICD-10-PCS | Mod: PBBFAC,,, | Performed by: OBSTETRICS & GYNECOLOGY

## 2019-11-12 PROCEDURE — 3075F PR MOST RECENT SYSTOLIC BLOOD PRESS GE 130-139MM HG: ICD-10-PCS | Mod: CPTII,S$GLB,, | Performed by: OBSTETRICS & GYNECOLOGY

## 2019-11-12 PROCEDURE — 3079F PR MOST RECENT DIASTOLIC BLOOD PRESSURE 80-89 MM HG: ICD-10-PCS | Mod: CPTII,S$GLB,, | Performed by: OBSTETRICS & GYNECOLOGY

## 2019-11-12 PROCEDURE — 99387 INIT PM E/M NEW PAT 65+ YRS: CPT | Mod: S$GLB,,, | Performed by: OBSTETRICS & GYNECOLOGY

## 2019-11-12 PROCEDURE — 99387 PR PREVENTIVE VISIT,NEW,65 & OVER: ICD-10-PCS | Mod: S$GLB,,, | Performed by: OBSTETRICS & GYNECOLOGY

## 2019-11-12 PROCEDURE — 99999 PR PBB SHADOW E&M-EST. PATIENT-LVL III: CPT | Mod: PBBFAC,,, | Performed by: OBSTETRICS & GYNECOLOGY

## 2019-11-12 NOTE — PROGRESS NOTES
Subjective:       Patient ID: Marsha Chun is a 74 y.o. female.    Chief Complaint:  Well Woman      History of Present Illness  HPI  Annual Exam-Postmenopausal  Patient presents for annual exam. The patient has no complaints today. The patient is not sexually active. GYN screening history: last pap: was normal and last mammogram: was normal. The patient is not taking hormone replacement therapy. Patient denies post-menopausal vaginal bleeding. The patient wears seatbelts: yes. The patient participates in regular exercise: yes. Has the patient ever been transfused or tattooed?: no. The patient reports that there is not domestic violence in her life.      Denies postmenopausal vaginal bleeding.  She has never had an abnormal pap smear.         GYN & OB History  No LMP recorded. Patient is postmenopausal.   Date of Last Pap: No result found    OB History    Para Term  AB Living   4 4 4     4   SAB TAB Ectopic Multiple Live Births           4      # Outcome Date GA Lbr Claudio/2nd Weight Sex Delivery Anes PTL Lv   4 Term      Vag-Spont   LICHA   3 Term      Vag-Spont   LICHA   2 Term      Vag-Spont   LICHA   1 Term      Vag-Spont   LICHA      Obstetric Comments   GYN Hx:   Menarche at 14 years old   Denies history of STDs or abnormal pap smears.   Postmenopausal at around 52 years old.        Past Medical History:  Past Medical History:   Diagnosis Date    History of TB skin testing     Hyperparathyroidism, unspecified     Hypertension     Osteopenia     Overweight(278.02)        Past Surgical History:  Past Surgical History:   Procedure Laterality Date    COLONOSCOPY N/A 10/29/2019    Procedure: COLONOSCOPY;  Surgeon: Aym Alex MD;  Location: [facility];  Service: Endoscopy;  Laterality: N/A;  confirmed appt-sp    lasik and a rotator cuff surgery      SHOULDER SURGERY         Family History:  Family History   Problem Relation Age of Onset    Stroke Sister     Diabetes Neg Hx     Thyroid  cancer Neg Hx     Osteoporosis Neg Hx        Allergies:  Review of patient's allergies indicates:  No Known Allergies    Medications:  Current Outpatient Medications on File Prior to Visit   Medication Sig Dispense Refill    atenolol-chlorthalidone (TENORETIC) 50-25 mg Tab Take 1 tablet by mouth once daily. 90 tablet 3    cholecalciferol, vitamin D3, (VITAMIN D3) 5,000 unit Tab Take 5,000 Units by mouth once daily.      fish oil-omega-3 fatty acids 300-1,000 mg capsule Take 2 g by mouth once daily.      ibuprofen (ADVIL,MOTRIN) 800 MG tablet Take 1 tablet (800 mg total) by mouth every 8 (eight) hours as needed for Pain. 20 tablet 0    atorvastatin (LIPITOR) 10 MG tablet Take 1 tablet (10 mg total) by mouth once daily. (Patient not taking: Reported on 11/12/2019) 90 tablet 3    diclofenac sodium (VOLTAREN) 1 % Gel Apply 2 g topically 3 (three) times daily. (Patient not taking: Reported on 11/12/2019) 1 Tube 1    oxybutynin (DITROPAN) 5 MG Tab Take 1 tablet (5 mg total) by mouth 2 (two) times daily. 360 tablet 0    [START ON 11/20/2019] polyethylene glycol (COLYTE) 240-22.72-6.72 -5.84 gram SolR Take 4,000 mLs (4 L total) by mouth once. for 1 dose (Patient not taking: Reported on 11/12/2019) 4000 mL 0    UNABLE TO FIND medication name: Omega XL       No current facility-administered medications on file prior to visit.        Social History:  Social History     Tobacco Use    Smoking status: Never Smoker    Smokeless tobacco: Never Used   Substance Use Topics    Alcohol use: No     Alcohol/week: 0.0 standard drinks    Drug use: No              Review of Systems  Review of Systems   Constitutional: Positive for activity change.   HENT: Negative.    Eyes: Negative.    Respiratory: Negative.    Cardiovascular: Negative.    Gastrointestinal: Positive for bloating.   Endocrine: Negative.    Genitourinary: Negative.  Positive for vaginal dryness.   Musculoskeletal: Negative.    Integumentary:  Negative.    Neurological: Negative.    Hematological: Negative.    Psychiatric/Behavioral: Negative.    Breast: negative.            Objective:    Physical Exam:   Constitutional: She is oriented to person, place, and time. She appears well-nourished.    HENT:   Head: Normocephalic and atraumatic.    Eyes: EOM are normal. Right eye exhibits normal extraocular motion. Left eye exhibits normal extraocular motion.    Neck: Neck supple. No thyromegaly present.    Cardiovascular: Normal rate.     Pulmonary/Chest: Effort normal. No respiratory distress. Right breast exhibits no mass, no skin change and no tenderness. Left breast exhibits no mass, no skin change and no tenderness. Breasts are symmetrical.        Abdominal: Soft. She exhibits no distension and no mass. There is no tenderness.     Genitourinary: Vagina normal and uterus normal. Pelvic exam was performed with patient prone. There is no rash or lesion on the right labia. There is no rash or lesion on the left labia. Uterus is not tender. Cervix is normal. Right adnexum displays no tenderness and no fullness. Left adnexum displays no tenderness and no fullness. No bleeding in the vagina. No vaginal discharge (atrophic appearance) found. Labial bartholins normal.Cervix exhibits no motion tenderness and no friability.           Musculoskeletal: Normal range of motion.      Lymphadenopathy:     She has no cervical adenopathy.        Right: No inguinal adenopathy present.        Left: No inguinal adenopathy present.    Neurological: She is oriented to person, place, and time.   Cranial Nerves II-XII grossly intact.    Skin: No rash noted. No erythema.    Psychiatric: She has a normal mood and affect. Her behavior is normal.          Assessment:        1. Encounter for gynecological examination without abnormal finding    2. Screening breast examination                Plan:      1. Encounter for gynecological examination without abnormal finding  - Pap no longer indicated.  -  Diet and exercise encouraged.  - Seat belt use encouraged.  - Reviewed ASCCP Pap guidelines and screening recommendations.  - Calcium and vitamin D recommended.        2. Screening breast examination  - Self breast exams encouraged   - Mammo Digital Screening Bilat w/ Anjel; Future

## 2019-11-12 NOTE — LETTER
November 12, 2019      Marcos Sevilla MD  [address] [facility] [address]           [facility] - OB/ GYN  [address]  [facility] [address]  Phone: [phone]          Patient: Marsha Chun   MR Number: [id number]   YOB: 1945   Date of Visit: 11/12/2019       Dear Dr. Marcos Sevilla:    Thank you for referring Marsha Chun to me for evaluation. Attached you will find relevant portions of my assessment and plan of care.    If you have questions, please do not hesitate to call me. I look forward to following Marsha Chun along with you.    Sincerely,    Georgie Munguia MD    Enclosure  CC:  No Recipients    If you would like to receive this communication electronically, please contact [email or web address] or [phone] to request more information on [employer] Link access.    For providers and/or their staff who would like to refer a patient to [facility], please contact us through our one-stop-shop provider referral line, [facility], at [phone].    If you feel you have received this communication in error or would no longer like to receive these types of communications, please e-mail [email or web address]

## 2019-11-19 ENCOUNTER — HOSPITAL ENCOUNTER (OUTPATIENT)
Dept: RADIOLOGY | Facility: HOSPITAL | Age: 74
Discharge: HOME | End: 2019-11-19
Attending: OBSTETRICS & GYNECOLOGY
Payer: COMMERCIAL

## 2019-11-19 VITALS — BODY MASS INDEX: 30.42 KG/M2 | HEIGHT: 65 IN | WEIGHT: 182.56 LBS

## 2019-11-19 DIAGNOSIS — Z12.31 ENCOUNTER FOR SCREENING MAMMOGRAM FOR BREAST CANCER: ICD-10-CM

## 2019-11-19 DIAGNOSIS — Z12.39 SCREENING BREAST EXAMINATION: ICD-10-CM

## 2019-11-19 PROCEDURE — 77067 MAMMO DIGITAL SCREENING BILAT WITH TOMOSYNTHESIS_CAD: ICD-10-PCS | Mod: 26,,, | Performed by: RADIOLOGY

## 2019-11-19 PROCEDURE — 77063 MAMMO DIGITAL SCREENING BILAT WITH TOMOSYNTHESIS_CAD: ICD-10-PCS | Mod: 26,,, | Performed by: RADIOLOGY

## 2019-11-19 PROCEDURE — 77063 BREAST TOMOSYNTHESIS BI: CPT | Mod: 26,,, | Performed by: RADIOLOGY

## 2019-11-19 PROCEDURE — 77067 SCR MAMMO BI INCL CAD: CPT | Mod: TC

## 2019-11-19 PROCEDURE — 77067 SCR MAMMO BI INCL CAD: CPT | Mod: 26,,, | Performed by: RADIOLOGY

## 2019-11-20 ENCOUNTER — TELEPHONE (OUTPATIENT)
Dept: OBSTETRICS AND GYNECOLOGY | Facility: CLINIC | Age: 74
End: 2019-11-20

## 2019-11-20 NOTE — TELEPHONE ENCOUNTER
----- Message from Georgie Munguia MD sent at 11/20/2019  8:44 AM CST -----  Please notify patient of normal results

## 2019-11-24 ENCOUNTER — PATIENT OUTREACH (OUTPATIENT)
Dept: ADMINISTRATIVE | Facility: OTHER | Age: 74
End: 2019-11-24

## 2019-11-26 ENCOUNTER — OFFICE VISIT (OUTPATIENT)
Dept: DERMATOLOGY | Facility: CLINIC | Age: 74
End: 2019-11-26
Payer: COMMERCIAL

## 2019-11-26 DIAGNOSIS — L81.9 HYPERPIGMENTATION: ICD-10-CM

## 2019-11-26 DIAGNOSIS — D23.9 INTRADERMAL NEVUS: ICD-10-CM

## 2019-11-26 DIAGNOSIS — L81.4 LENTIGO: Primary | ICD-10-CM

## 2019-11-26 PROCEDURE — 99999 PR PBB SHADOW E&M-EST. PATIENT-LVL II: ICD-10-PCS | Mod: PBBFAC,,, | Performed by: DERMATOLOGY

## 2019-11-26 PROCEDURE — 99202 OFFICE O/P NEW SF 15 MIN: CPT | Mod: S$GLB,,, | Performed by: DERMATOLOGY

## 2019-11-26 PROCEDURE — 99202 PR OFFICE/OUTPT VISIT, NEW, LEVL II, 15-29 MIN: ICD-10-PCS | Mod: S$GLB,,, | Performed by: DERMATOLOGY

## 2019-11-26 PROCEDURE — 99999 PR PBB SHADOW E&M-EST. PATIENT-LVL II: CPT | Mod: PBBFAC,,, | Performed by: DERMATOLOGY

## 2019-11-26 NOTE — PROGRESS NOTES
Subjective:       Patient ID:  Marsha Chun is a 74 y.o. female who presents for   Chief Complaint   Patient presents with    Skin Check     face only    Skin Discoloration     chest     Patient is here today for a mole check. yes  Pt has a history of sun exposure in the past. yes  Pt recalls several blistering sunburns in the past- no  Pt has history of tanning bed use- ni  Pt has  had moles removed in the past- no  Pt has history of melanoma in first degree relatives-  no      Skin Discoloration  - Initial  Affected locations: chest  Duration: 1 year  Severity: mild  Timing: constant  Aggravated by: nothing  Relieving factors/Treatments tried: nothing  Improvement on treatment: no relief        Review of Systems   Constitutional: Negative for fever, chills and fatigue.   Skin: Positive for daily sunscreen use. Negative for recent sunburn.   Hematologic/Lymphatic: Does not bruise/bleed easily.        Objective:    Physical Exam   Constitutional: She appears well-developed and well-nourished.   Neurological: She is alert and oriented to person, place, and time.   Psychiatric: She has a normal mood and affect.   Skin:   Areas Examined (abnormalities noted in diagram):   Scalp / Hair Palpated and Inspected  Head / Face Inspection Performed  Neck Inspection Performed  Chest / Axilla Inspection Performed                   Diagram Legend     Erythematous scaling macule/papule c/w actinic keratosis       Vascular papule c/w angioma      Pigmented verrucoid papule/plaque c/w seborrheic keratosis      Yellow umbilicated papule c/w sebaceous hyperplasia      Irregularly shaped tan macule c/w lentigo     1-2 mm smooth white papules consistent with Milia      Movable subcutaneous cyst with punctum c/w epidermal inclusion cyst      Subcutaneous movable cyst c/w pilar cyst      Firm pink to brown papule c/w dermatofibroma      Pedunculated fleshy papule(s) c/w skin tag(s)      Evenly pigmented macule c/w junctional  nevus     Mildly variegated pigmented, slightly irregular-bordered macule c/w mildly atypical nevus      Flesh colored to evenly pigmented papule c/w intradermal nevus       Pink pearly papule/plaque c/w basal cell carcinoma      Erythematous hyperkeratotic cursted plaque c/w SCC      Surgical scar with no sign of skin cancer recurrence      Open and closed comedones      Inflammatory papules and pustules      Verrucoid papule consistent consistent with wart     Erythematous eczematous patches and plaques     Dystrophic onycholytic nail with subungual debris c/w onychomycosis     Umbilicated papule    Erythematous-base heme-crusted tan verrucoid plaque consistent with inflamed seborrheic keratosis     Erythematous Silvery Scaling Plaque c/w Psoriasis     See annotation      Assessment / Plan:        Lentigo  This is a benign hyperpigmented sun induced lesion. Daily sun protection will reduce the number of new lesions. Treatment of these benign lesions are considered cosmetic.      Intradermal nevus  Comments:  with underlying milia  - Discussed ABCDE's of nevi.  Monitor for new mole or moles that are becoming bigger, darker, irritated, or developing irregular borders. Brochure provided.      Hyperpigmentation  -Start adapalene OTC gel q pm to area retnoid handout provided in AVS and gone over with patient  - if not improving in 2 months will consider other compounded lightening cream                No follow-ups on file.

## 2019-12-30 PROBLEM — Z00.00 HEALTHCARE MAINTENANCE: Status: RESOLVED | Noted: 2019-09-30 | Resolved: 2019-12-30

## 2020-01-17 DIAGNOSIS — I10 ESSENTIAL HYPERTENSION: ICD-10-CM

## 2020-01-20 ENCOUNTER — LAB VISIT (OUTPATIENT)
Dept: LAB | Facility: HOSPITAL | Age: 75
End: 2020-01-20
Attending: INTERNAL MEDICINE
Payer: COMMERCIAL

## 2020-01-20 DIAGNOSIS — I10 ESSENTIAL HYPERTENSION: ICD-10-CM

## 2020-01-20 DIAGNOSIS — E21.0 HYPERPARATHYROIDISM, PRIMARY: ICD-10-CM

## 2020-01-20 DIAGNOSIS — E78.2 MIXED HYPERLIPIDEMIA: ICD-10-CM

## 2020-01-20 DIAGNOSIS — Z00.00 HEALTHCARE MAINTENANCE: ICD-10-CM

## 2020-01-20 LAB
ALBUMIN SERPL BCP-MCNC: 3.5 G/DL (ref 3.5–5.2)
ALP SERPL-CCNC: 151 U/L (ref 55–135)
ALT SERPL W/O P-5'-P-CCNC: 10 U/L (ref 10–44)
ANION GAP SERPL CALC-SCNC: 9 MMOL/L (ref 8–16)
AST SERPL-CCNC: 21 U/L (ref 10–40)
BASOPHILS # BLD AUTO: 0.06 K/UL (ref 0–0.2)
BASOPHILS NFR BLD: 0.8 % (ref 0–1.9)
BILIRUB SERPL-MCNC: 0.5 MG/DL (ref 0.1–1)
BUN SERPL-MCNC: 11 MG/DL (ref 8–23)
CA-I BLDV-SCNC: 1.29 MMOL/L (ref 1.06–1.42)
CALCIUM SERPL-MCNC: 9 MG/DL (ref 8.7–10.5)
CHLORIDE SERPL-SCNC: 107 MMOL/L (ref 95–110)
CHOLEST SERPL-MCNC: 217 MG/DL (ref 120–199)
CHOLEST/HDLC SERPL: 2.6 {RATIO} (ref 2–5)
CO2 SERPL-SCNC: 24 MMOL/L (ref 23–29)
CREAT SERPL-MCNC: 0.7 MG/DL (ref 0.5–1.4)
DIFFERENTIAL METHOD: ABNORMAL
EOSINOPHIL # BLD AUTO: 0.1 K/UL (ref 0–0.5)
EOSINOPHIL NFR BLD: 1.6 % (ref 0–8)
ERYTHROCYTE [DISTWIDTH] IN BLOOD BY AUTOMATED COUNT: 14.5 % (ref 11.5–14.5)
EST. GFR  (AFRICAN AMERICAN): >60 ML/MIN/1.73 M^2
EST. GFR  (NON AFRICAN AMERICAN): >60 ML/MIN/1.73 M^2
ESTIMATED AVG GLUCOSE: 108 MG/DL (ref 68–131)
GLUCOSE SERPL-MCNC: 83 MG/DL (ref 70–110)
HBA1C MFR BLD HPLC: 5.4 % (ref 4–5.6)
HCT VFR BLD AUTO: 38.1 % (ref 37–48.5)
HDLC SERPL-MCNC: 85 MG/DL (ref 40–75)
HDLC SERPL: 39.2 % (ref 20–50)
HGB BLD-MCNC: 12.5 G/DL (ref 12–16)
IMM GRANULOCYTES # BLD AUTO: 0.02 K/UL (ref 0–0.04)
IMM GRANULOCYTES NFR BLD AUTO: 0.3 % (ref 0–0.5)
LDLC SERPL CALC-MCNC: 112 MG/DL (ref 63–159)
LYMPHOCYTES # BLD AUTO: 1.8 K/UL (ref 1–4.8)
LYMPHOCYTES NFR BLD: 24.2 % (ref 18–48)
MCH RBC QN AUTO: 25.5 PG (ref 27–31)
MCHC RBC AUTO-ENTMCNC: 32.8 G/DL (ref 32–36)
MCV RBC AUTO: 78 FL (ref 82–98)
MONOCYTES # BLD AUTO: 0.5 K/UL (ref 0.3–1)
MONOCYTES NFR BLD: 6.7 % (ref 4–15)
NEUTROPHILS # BLD AUTO: 4.9 K/UL (ref 1.8–7.7)
NEUTROPHILS NFR BLD: 66.4 % (ref 38–73)
NONHDLC SERPL-MCNC: 132 MG/DL
NRBC BLD-RTO: 0 /100 WBC
PLATELET # BLD AUTO: 249 K/UL (ref 150–350)
PMV BLD AUTO: 11 FL (ref 9.2–12.9)
POTASSIUM SERPL-SCNC: 4.2 MMOL/L (ref 3.5–5.1)
PROT SERPL-MCNC: 7.2 G/DL (ref 6–8.4)
PTH-INTACT SERPL-MCNC: 130 PG/ML (ref 9–77)
RBC # BLD AUTO: 4.9 M/UL (ref 4–5.4)
SODIUM SERPL-SCNC: 140 MMOL/L (ref 136–145)
TRIGL SERPL-MCNC: 100 MG/DL (ref 30–150)
TSH SERPL DL<=0.005 MIU/L-ACNC: 3.94 UIU/ML (ref 0.4–4)
WBC # BLD AUTO: 7.35 K/UL (ref 3.9–12.7)

## 2020-01-20 PROCEDURE — 80053 COMPREHEN METABOLIC PANEL: CPT

## 2020-01-20 PROCEDURE — 36415 COLL VENOUS BLD VENIPUNCTURE: CPT | Mod: PO

## 2020-01-20 PROCEDURE — 82330 ASSAY OF CALCIUM: CPT

## 2020-01-20 PROCEDURE — 83036 HEMOGLOBIN GLYCOSYLATED A1C: CPT

## 2020-01-20 PROCEDURE — 84443 ASSAY THYROID STIM HORMONE: CPT

## 2020-01-20 PROCEDURE — 85025 COMPLETE CBC W/AUTO DIFF WBC: CPT

## 2020-01-20 PROCEDURE — 80061 LIPID PANEL: CPT

## 2020-01-20 PROCEDURE — 83970 ASSAY OF PARATHORMONE: CPT

## 2020-01-20 RX ORDER — ATENOLOL AND CHLORTHALIDONE TABLET 50; 25 MG/1; MG/1
1 TABLET ORAL DAILY
Qty: 90 TABLET | Refills: 1 | Status: SHIPPED | OUTPATIENT
Start: 2020-01-20 | End: 2020-07-20 | Stop reason: SDUPTHER

## 2020-01-24 ENCOUNTER — TELEPHONE (OUTPATIENT)
Dept: FAMILY MEDICINE | Facility: CLINIC | Age: 75
End: 2020-01-24

## 2020-01-24 NOTE — TELEPHONE ENCOUNTER
I spoke to the pt and advised of results below. Pt voices understanding and will keep next appointment.

## 2020-01-24 NOTE — TELEPHONE ENCOUNTER
----- Message from Marcos Sevilla MD sent at 1/23/2020 11:08 PM CST -----  Please contact the patient and let them know that their labs were stable and do not require any change in treatment.  Keep follow up visit with me to discuss details.

## 2020-01-27 ENCOUNTER — OFFICE VISIT (OUTPATIENT)
Dept: FAMILY MEDICINE | Facility: CLINIC | Age: 75
End: 2020-01-27
Payer: COMMERCIAL

## 2020-01-27 ENCOUNTER — PATIENT OUTREACH (OUTPATIENT)
Dept: ADMINISTRATIVE | Facility: OTHER | Age: 75
End: 2020-01-27

## 2020-01-27 VITALS
WEIGHT: 183 LBS | OXYGEN SATURATION: 97 % | SYSTOLIC BLOOD PRESSURE: 136 MMHG | BODY MASS INDEX: 30.49 KG/M2 | DIASTOLIC BLOOD PRESSURE: 84 MMHG | RESPIRATION RATE: 20 BRPM | HEIGHT: 65 IN | HEART RATE: 82 BPM | TEMPERATURE: 98 F

## 2020-01-27 DIAGNOSIS — E21.0 HYPERPARATHYROIDISM, PRIMARY: ICD-10-CM

## 2020-01-27 DIAGNOSIS — I10 ESSENTIAL HYPERTENSION: Primary | ICD-10-CM

## 2020-01-27 DIAGNOSIS — E78.2 MIXED HYPERLIPIDEMIA: ICD-10-CM

## 2020-01-27 DIAGNOSIS — Z86.73 HISTORY OF TRANSIENT ISCHEMIC ATTACK (TIA): ICD-10-CM

## 2020-01-27 DIAGNOSIS — Z00.00 HEALTHCARE MAINTENANCE: ICD-10-CM

## 2020-01-27 PROCEDURE — 99214 PR OFFICE/OUTPT VISIT, EST, LEVL IV, 30-39 MIN: ICD-10-PCS | Mod: S$GLB,,, | Performed by: INTERNAL MEDICINE

## 2020-01-27 PROCEDURE — 99214 OFFICE O/P EST MOD 30 MIN: CPT | Mod: S$GLB,,, | Performed by: INTERNAL MEDICINE

## 2020-01-27 PROCEDURE — 1101F PT FALLS ASSESS-DOCD LE1/YR: CPT | Mod: CPTII,S$GLB,, | Performed by: INTERNAL MEDICINE

## 2020-01-27 PROCEDURE — 1159F MED LIST DOCD IN RCRD: CPT | Mod: S$GLB,,, | Performed by: INTERNAL MEDICINE

## 2020-01-27 PROCEDURE — 3079F DIAST BP 80-89 MM HG: CPT | Mod: CPTII,S$GLB,, | Performed by: INTERNAL MEDICINE

## 2020-01-27 PROCEDURE — 3075F SYST BP GE 130 - 139MM HG: CPT | Mod: CPTII,S$GLB,, | Performed by: INTERNAL MEDICINE

## 2020-01-27 PROCEDURE — 1126F AMNT PAIN NOTED NONE PRSNT: CPT | Mod: S$GLB,,, | Performed by: INTERNAL MEDICINE

## 2020-01-27 PROCEDURE — 1159F PR MEDICATION LIST DOCUMENTED IN MEDICAL RECORD: ICD-10-PCS | Mod: S$GLB,,, | Performed by: INTERNAL MEDICINE

## 2020-01-27 PROCEDURE — 1101F PR PT FALLS ASSESS DOC 0-1 FALLS W/OUT INJ PAST YR: ICD-10-PCS | Mod: CPTII,S$GLB,, | Performed by: INTERNAL MEDICINE

## 2020-01-27 PROCEDURE — 1126F PR PAIN SEVERITY QUANTIFIED, NO PAIN PRESENT: ICD-10-PCS | Mod: S$GLB,,, | Performed by: INTERNAL MEDICINE

## 2020-01-27 PROCEDURE — 99999 PR PBB SHADOW E&M-EST. PATIENT-LVL III: ICD-10-PCS | Mod: PBBFAC,,, | Performed by: INTERNAL MEDICINE

## 2020-01-27 PROCEDURE — 3075F PR MOST RECENT SYSTOLIC BLOOD PRESS GE 130-139MM HG: ICD-10-PCS | Mod: CPTII,S$GLB,, | Performed by: INTERNAL MEDICINE

## 2020-01-27 PROCEDURE — 3079F PR MOST RECENT DIASTOLIC BLOOD PRESSURE 80-89 MM HG: ICD-10-PCS | Mod: CPTII,S$GLB,, | Performed by: INTERNAL MEDICINE

## 2020-01-27 PROCEDURE — 99999 PR PBB SHADOW E&M-EST. PATIENT-LVL III: CPT | Mod: PBBFAC,,, | Performed by: INTERNAL MEDICINE

## 2020-01-27 RX ORDER — ATORVASTATIN CALCIUM 10 MG/1
10 TABLET, FILM COATED ORAL DAILY
Qty: 90 TABLET | Refills: 3
Start: 2020-01-27 | End: 2020-04-28

## 2020-01-27 RX ORDER — ATORVASTATIN CALCIUM 20 MG/1
20 TABLET, FILM COATED ORAL DAILY
Qty: 90 TABLET | Refills: 3 | Status: CANCELLED | OUTPATIENT
Start: 2020-01-27 | End: 2021-01-26

## 2020-01-27 NOTE — PROGRESS NOTES
HISTORY OF PRESENT ILLNESS:  Marsha Chun is a 75 y.o. female who presents to the clinic today for Annual Exam    [facility] - much better.  Saw Dr. Mainor Ca.  Notes left knee pain for which seeing them.  Takes ibuprofen 800 mg as needed for pain (up to twice a day as needed).  Doing stretching and exercise at home    Hyperlipidemia  The 10-year ASCVD risk score (Pilarthalia JAMES Jr., et al., 2013) is: 18.3%    Values used to calculate the score:      Age: 75 years      Sex: Female      Is Non- : Yes      Diabetic: No      Tobacco smoker: No      Systolic Blood Pressure: 136 mmHg      Is BP treated: Yes      HDL Cholesterol: 85 mg/dL      Total Cholesterol: 217 mg/dL  Discussed use of statin today and has been hesitant to try in the past.  Reports she will try taking atorvastatin 10 mg daily.    PAST MEDICAL HISTORY:  Past Medical History:   Diagnosis Date    History of TB skin testing     Hyperparathyroidism, unspecified     Hypertension     Osteopenia     Overweight(278.02)        PAST SURGICAL HISTORY:  Past Surgical History:   Procedure Laterality Date    COLONOSCOPY N/A 10/29/2019    Procedure: COLONOSCOPY;  Surgeon: Amy Alex MD;  Location: [facility];  Service: Endoscopy;  Laterality: N/A;  confirmed appt-sp    lasik and a rotator cuff surgery      SHOULDER SURGERY         SOCIAL HISTORY:  Social History     Socioeconomic History    Marital status:      Spouse name: Not on file    Number of children: Not on file    Years of education: Not on file    Highest education level: Not on file   Occupational History    Not on file   Social Needs    Financial resource strain: Not on file    Food insecurity:     Worry: Not on file     Inability: Not on file    Transportation needs:     Medical: Not on file     Non-medical: Not on file   Tobacco Use    Smoking status: Never Smoker    Smokeless tobacco: Never Used   Substance and  Sexual Activity    Alcohol use: No     Alcohol/week: 0.0 standard drinks    Drug use: No    Sexual activity: Not on file   Lifestyle    Physical activity:     Days per week: Not on file     Minutes per session: Not on file    Stress: Not on file   Relationships    Social connections:     Talks on phone: Not on file     Gets together: Not on file     Attends Hindu service: Not on file     Active member of club or organization: Not on file     Attends meetings of clubs or organizations: Not on file     Relationship status: Not on file   Other Topics Concern    Not on file   Social History Narrative    Not on file       FAMILY HISTORY:  Family History   Problem Relation Age of Onset    Stroke Sister     Diabetes Neg Hx     Thyroid cancer Neg Hx     Osteoporosis Neg Hx        ALLERGIES AND MEDICATIONS: updated and reviewed.  Review of patient's allergies indicates:  No Known Allergies  Medication List with Changes/Refills   Current Medications    ATENOLOL-CHLORTHALIDONE (TENORETIC) 50-25 MG TAB    Take 1 tablet by mouth once daily.    ATORVASTATIN (LIPITOR) 10 MG TABLET    Take 1 tablet (10 mg total) by mouth once daily.    CHOLECALCIFEROL, VITAMIN D3, (VITAMIN D3) 5,000 UNIT TAB    Take 5,000 Units by mouth once daily.    DICLOFENAC SODIUM (VOLTAREN) 1 % GEL    Apply 2 g topically 3 (three) times daily.    FISH OIL-OMEGA-3 FATTY ACIDS 300-1,000 MG CAPSULE    Take 2 g by mouth once daily.    IBUPROFEN (ADVIL,MOTRIN) 800 MG TABLET    Take 1 tablet (800 mg total) by mouth every 8 (eight) hours as needed for Pain.    OXYBUTYNIN (DITROPAN) 5 MG TAB    Take 1 tablet (5 mg total) by mouth 2 (two) times daily.    UNABLE TO FIND    medication name: Omega XL          CARE TEAM:  Patient Care Team:  Marcos Sevilla MD as PCP - General (Internal Medicine)  Nisa Dunaway MA as Care Coordinator         REVIEW OF SYSTEMS:  Review of Systems   Constitutional: Negative for fatigue and fever.   HENT: Negative for  congestion and postnasal drip.    Eyes: Negative for photophobia and visual disturbance.   Respiratory: Negative for cough and shortness of breath.    Cardiovascular: Negative for chest pain and palpitations.   Gastrointestinal: Negative for nausea and vomiting.   Genitourinary: Negative for dysuria and frequency.   Musculoskeletal: Positive for arthralgias. Negative for back pain.   Neurological: Negative for light-headedness and headaches.   Psychiatric/Behavioral: Negative for dysphoric mood. The patient is not nervous/anxious.          PHYSICAL EXAM:   Vitals:    01/27/20 1320   BP: 136/84   Pulse: 82   Resp: 20   Temp: 98.3 °F (36.8 °C)             Body mass index is 30.45 kg/m².     General appearance - alert, well appearing, and in no distress and obese  Mental status - normal mood, behavior, speech, dress, motor activity, and thought processes  Eyes - sclera anicteric, left eye normal, right eye normal  Chest - clear to auscultation, no wheezes, rales or rhonchi, symmetric air entry  Heart - normal rate and regular rhythm, no murmurs noted  Neurological - alert, oriented, normal speech, no focal findings or movement disorder noted, motor and sensory grossly normal bilaterally  Extremities - peripheral pulses normal, no pedal edema, no clubbing or cyanosis      ASSESSMENT AND PLAN:  1. Essential hypertension  - controlled on current regimen    2. Mixed hyperlipidemia  - trial of low dose statin she has agreed for this.  - atorvastatin (LIPITOR) 10 MG tablet; Take 1 tablet (10 mg total) by mouth once daily.  Dispense: 90 tablet; Refill: 3  - Lipid panel; Future  - Comprehensive metabolic panel; Future    3. History of transient ischemic attack (TIA)    4. Hyperparathyroidism, primary  - stable    5. Healthcare maintenance  - Lipid panel; Future  - Comprehensive metabolic panel; Future           Follow up 3 months or sooner as needed.

## 2020-01-28 ENCOUNTER — OFFICE VISIT (OUTPATIENT)
Dept: DERMATOLOGY | Facility: CLINIC | Age: 75
End: 2020-01-28
Payer: COMMERCIAL

## 2020-01-28 DIAGNOSIS — D23.9 INTRADERMAL NEVUS: ICD-10-CM

## 2020-01-28 DIAGNOSIS — L81.9 DYSCHROMIA: Primary | ICD-10-CM

## 2020-01-28 DIAGNOSIS — D18.01 CHERRY ANGIOMA: ICD-10-CM

## 2020-01-28 DIAGNOSIS — L81.2 FRECKLE: ICD-10-CM

## 2020-01-28 PROCEDURE — 99999 PR PBB SHADOW E&M-EST. PATIENT-LVL III: ICD-10-PCS | Mod: PBBFAC,,, | Performed by: DERMATOLOGY

## 2020-01-28 PROCEDURE — 99213 OFFICE O/P EST LOW 20 MIN: CPT | Mod: S$GLB,,, | Performed by: DERMATOLOGY

## 2020-01-28 PROCEDURE — 99999 PR PBB SHADOW E&M-EST. PATIENT-LVL III: CPT | Mod: PBBFAC,,, | Performed by: DERMATOLOGY

## 2020-01-28 PROCEDURE — 99213 PR OFFICE/OUTPT VISIT, EST, LEVL III, 20-29 MIN: ICD-10-PCS | Mod: S$GLB,,, | Performed by: DERMATOLOGY

## 2020-01-28 NOTE — PATIENT INSTRUCTIONS

## 2020-01-28 NOTE — PROGRESS NOTES
Subjective:       Patient ID:  Marsha Chun is a 75 y.o. female who presents for   Chief Complaint   Patient presents with    Follow-up     skin discoloration on chest better    Spot     on left side of nose     Follow-up  - Follow-up  Symptom course: improving  Affected locations: currently clear  Signs / symptoms: asymptomatic  Severity: mild    Spot  - Initial  Affected locations: nose  Duration: 2 months  Signs / symptoms: asymptomatic  Severity: mild  Timing: constant  Aggravated by: nothing  Relieving factors/Treatments tried: nothing  Improvement on treatment: no relief      75 year old female established patient with freckles and dyschromia. She was last seen November 2019. At that time recommended to start OTC differin gel. Never got because she said she read it was for acne so she did not buy it.     Also has mole to left nose that she would like checked.     Review of Systems   Constitutional: Negative for fever, chills and fatigue.   Skin: Positive for daily sunscreen use. Negative for recent sunburn and wears hat.   Hematologic/Lymphatic: Does not bruise/bleed easily.        Objective:    Physical Exam   Constitutional: She appears well-developed and well-nourished.   Neurological: She is alert and oriented to person, place, and time.   Psychiatric: She has a normal mood and affect.   Skin:   Areas Examined (abnormalities noted in diagram):   Scalp / Hair Palpated and Inspected  Head / Face Inspection Performed  Neck Inspection Performed  Chest / Axilla Inspection Performed  Abdomen Inspection Performed                   Diagram Legend     Erythematous scaling macule/papule c/w actinic keratosis       Vascular papule c/w angioma      Pigmented verrucoid papule/plaque c/w seborrheic keratosis      Yellow umbilicated papule c/w sebaceous hyperplasia      Irregularly shaped tan macule c/w lentigo     1-2 mm smooth white papules consistent with Milia      Movable subcutaneous cyst with punctum  c/w epidermal inclusion cyst      Subcutaneous movable cyst c/w pilar cyst      Firm pink to brown papule c/w dermatofibroma      Pedunculated fleshy papule(s) c/w skin tag(s)      Evenly pigmented macule c/w junctional nevus     Mildly variegated pigmented, slightly irregular-bordered macule c/w mildly atypical nevus      Flesh colored to evenly pigmented papule c/w intradermal nevus       Pink pearly papule/plaque c/w basal cell carcinoma      Erythematous hyperkeratotic cursted plaque c/w SCC      Surgical scar with no sign of skin cancer recurrence      Open and closed comedones      Inflammatory papules and pustules      Verrucoid papule consistent consistent with wart     Erythematous eczematous patches and plaques     Dystrophic onycholytic nail with subungual debris c/w onychomycosis     Umbilicated papule    Erythematous-base heme-crusted tan verrucoid plaque consistent with inflamed seborrheic keratosis     Erythematous Silvery Scaling Plaque c/w Psoriasis     See annotation          Assessment / Plan:        Dyschromia   - counseled patient on using differin gel is cheapest retinoid and how to use  -- counseled patient this is a long-term problem and multiple peels/creams will need to be used before we will see resolution and may only see some lightening affect but to continue to work on what she is doing. Sun protection is her best friend, and wearing hats, avoiding outdoors, even wearing hats in the car will help to prevent darkening.   - Use Sunscreen SPF 50 or above to face q am      Lentigo  This is a benign hyperpigmented sun induced lesion. Daily sun protection will reduce the number of new lesions. Treatment of these benign lesions are considered cosmetic.      Intradermal nevus c cyst  - picture taken this vs. BCC, patient is lighter courtney 3-4 and has poilkoderma, patient would not like biopsy but would like to watch and wait     Cherry angioma  This is a benign vascular lesion. Reassurance given.  No treatment required.     F/u 5-6 mo for nevus recheck/ progress of dyschromia         No follow-ups on file.

## 2020-04-28 ENCOUNTER — OFFICE VISIT (OUTPATIENT)
Dept: FAMILY MEDICINE | Facility: CLINIC | Age: 75
End: 2020-04-28
Payer: COMMERCIAL

## 2020-04-28 DIAGNOSIS — E78.2 MIXED HYPERLIPIDEMIA: ICD-10-CM

## 2020-04-28 DIAGNOSIS — I10 ESSENTIAL HYPERTENSION: ICD-10-CM

## 2020-04-28 DIAGNOSIS — M17.12 OSTEOARTHRITIS OF LEFT KNEE, UNSPECIFIED OSTEOARTHRITIS TYPE: Primary | ICD-10-CM

## 2020-04-28 PROCEDURE — 99213 PR OFFICE/OUTPT VISIT, EST, LEVL III, 20-29 MIN: ICD-10-PCS | Mod: 95,,, | Performed by: INTERNAL MEDICINE

## 2020-04-28 PROCEDURE — 99213 OFFICE O/P EST LOW 20 MIN: CPT | Mod: 95,,, | Performed by: INTERNAL MEDICINE

## 2020-04-28 RX ORDER — DICLOFENAC SODIUM 10 MG/G
2 GEL TOPICAL 3 TIMES DAILY
Qty: 1 TUBE | Refills: 1 | Status: SHIPPED | OUTPATIENT
Start: 2020-04-28 | End: 2020-11-30 | Stop reason: SDUPTHER

## 2020-04-28 NOTE — PROGRESS NOTES
HISTORY OF PRESENT ILLNESS:  Marsha Chun is a 75 y.o. female who presents to the clinic today for left knee pain    The patient location is: [place], [place]  The chief complaint leading to consultation is: left knee pain  Visit type: audio only  Total time spent with patient: 12 minutes  Each patient to whom he or she provides medical services by telemedicine is:  (1) informed of the relationship between the physician and patient and the respective role of any other health care provider with respect to management of the patient; and (2) notified that he or she may decline to receive medical services by telemedicine and may withdraw from such care at any time.    Notes:  Left posterior knee pain for which saw Dr. Mainor Ca.  Has been taking as needed Tylenol with mild relief.  Using Epsom salt soaks.  Has not used Voltaren gel.    She never started the atorvastatin as she remains hesitant about side effects.    The 10-year ASCVD risk score (Pilar ERIKA Jr., et al., 2013) is: 18.3%    Values used to calculate the score:      Age: 75 years      Sex: Female      Is Non- : Yes      Diabetic: No      Tobacco smoker: No      Systolic Blood Pressure: 136 mmHg      Is BP treated: Yes      HDL Cholesterol: 85 mg/dL      Total Cholesterol: 217 mg/dL    PAST MEDICAL HISTORY:  Past Medical History:   Diagnosis Date    History of TB skin testing     Hyperparathyroidism, unspecified     Hypertension     Osteopenia     Overweight(278.02)        PAST SURGICAL HISTORY:  Past Surgical History:   Procedure Laterality Date    COLONOSCOPY N/A 10/29/2019    Procedure: COLONOSCOPY;  Surgeon: Amy Alex MD;  Location: [facility];  Service: Endoscopy;  Laterality: N/A;  confirmed appt-sp    lasik and a rotator cuff surgery      SHOULDER SURGERY         SOCIAL HISTORY:  Social History     Socioeconomic History    Marital status:      Spouse name: Not on file    Number of children: Not on  file    Years of education: Not on file    Highest education level: Not on file   Occupational History    Not on file   Social Needs    Financial resource strain: Not on file    Food insecurity:     Worry: Not on file     Inability: Not on file    Transportation needs:     Medical: Not on file     Non-medical: Not on file   Tobacco Use    Smoking status: Never Smoker    Smokeless tobacco: Never Used   Substance and Sexual Activity    Alcohol use: No     Alcohol/week: 0.0 standard drinks    Drug use: No    Sexual activity: Not on file   Lifestyle    Physical activity:     Days per week: Not on file     Minutes per session: Not on file    Stress: Not on file   Relationships    Social connections:     Talks on phone: Not on file     Gets together: Not on file     Attends Advent service: Not on file     Active member of club or organization: Not on file     Attends meetings of clubs or organizations: Not on file     Relationship status: Not on file   Other Topics Concern    Not on file   Social History Narrative    Not on file       FAMILY HISTORY:  Family History   Problem Relation Age of Onset    Stroke Sister     Diabetes Neg Hx     Thyroid cancer Neg Hx     Osteoporosis Neg Hx        ALLERGIES AND MEDICATIONS: updated and reviewed.  Review of patient's allergies indicates:  No Known Allergies  Medication List with Changes/Refills   Current Medications    ATENOLOL-CHLORTHALIDONE (TENORETIC) 50-25 MG TAB    Take 1 tablet by mouth once daily.    ATORVASTATIN (LIPITOR) 10 MG TABLET    Take 1 tablet (10 mg total) by mouth once daily.    CHOLECALCIFEROL, VITAMIN D3, (VITAMIN D3) 5,000 UNIT TAB    Take 5,000 Units by mouth once daily.    DICLOFENAC SODIUM (VOLTAREN) 1 % GEL    Apply 2 g topically 3 (three) times daily.    FISH OIL-OMEGA-3 FATTY ACIDS 300-1,000 MG CAPSULE    Take 2 g by mouth once daily.    IBUPROFEN (ADVIL,MOTRIN) 800 MG TABLET    Take 1 tablet (800 mg total) by mouth every 8 (eight)  hours as needed for Pain.    OXYBUTYNIN (DITROPAN) 5 MG TAB    Take 1 tablet (5 mg total) by mouth 2 (two) times daily.    UNABLE TO FIND    medication name: Omega XL      CARE TEAM:  Patient Care Team:  Marcos Sevilla MD as PCP - General (Internal Medicine)  Nisa Dunaway MA as Care Coordinator     REVIEW OF SYSTEMS:  Review of Systems   Constitutional: Negative for chills and fever.   HENT: Negative for congestion and postnasal drip.    Respiratory: Negative for cough and shortness of breath.    Cardiovascular: Negative for chest pain and palpitations.   Gastrointestinal: Negative for nausea and vomiting.   Genitourinary: Negative for dysuria and frequency.   Musculoskeletal: Negative for arthralgias and back pain.   Neurological: Negative for light-headedness and headaches.   Psychiatric/Behavioral: Negative for dysphoric mood. The patient is not nervous/anxious.      PHYSICAL EXAM:    Mental status - alert, oriented to person, place, and time    ASSESSMENT AND PLAN:  Osteoarthritis of left knee, unspecified osteoarthritis type  -     diclofenac sodium (VOLTAREN) 1 % Gel; Apply 2 g topically 3 (three) times daily.  Dispense: 1 Tube; Refill: 1  - Advised that she can take ibuprofen as needed for the pain if Tylenol did not provide adequate relief.    Essential hypertension        - Stable on current meds.    Mixed hyperlipidemia        - After counseling of risk/benefit of statin, she wishes to not take it and prefers to pursue lifestyle management.        Follow up 3 months or sooner as needed.

## 2020-06-04 ENCOUNTER — LAB VISIT (OUTPATIENT)
Dept: LAB | Facility: HOSPITAL | Age: 75
End: 2020-06-04
Attending: INTERNAL MEDICINE
Payer: MEDICARE

## 2020-06-04 DIAGNOSIS — E78.2 MIXED HYPERLIPIDEMIA: ICD-10-CM

## 2020-06-04 DIAGNOSIS — Z00.00 HEALTHCARE MAINTENANCE: ICD-10-CM

## 2020-06-04 DIAGNOSIS — R74.8 ELEVATED ALKALINE PHOSPHATASE LEVEL: Primary | ICD-10-CM

## 2020-06-04 LAB
ALBUMIN SERPL BCP-MCNC: 4 G/DL (ref 3.5–5.2)
ALP SERPL-CCNC: 167 U/L (ref 55–135)
ALT SERPL W/O P-5'-P-CCNC: 13 U/L (ref 10–44)
ANION GAP SERPL CALC-SCNC: 7 MMOL/L (ref 8–16)
AST SERPL-CCNC: 19 U/L (ref 10–40)
BILIRUB SERPL-MCNC: 0.7 MG/DL (ref 0.1–1)
BUN SERPL-MCNC: 9 MG/DL (ref 8–23)
CALCIUM SERPL-MCNC: 10.2 MG/DL (ref 8.7–10.5)
CHLORIDE SERPL-SCNC: 104 MMOL/L (ref 95–110)
CHOLEST SERPL-MCNC: 244 MG/DL (ref 120–199)
CHOLEST/HDLC SERPL: 3 {RATIO} (ref 2–5)
CO2 SERPL-SCNC: 27 MMOL/L (ref 23–29)
CREAT SERPL-MCNC: 0.8 MG/DL (ref 0.5–1.4)
EST. GFR  (AFRICAN AMERICAN): >60 ML/MIN/1.73 M^2
EST. GFR  (NON AFRICAN AMERICAN): >60 ML/MIN/1.73 M^2
GLUCOSE SERPL-MCNC: 88 MG/DL (ref 70–110)
HDLC SERPL-MCNC: 82 MG/DL (ref 40–75)
HDLC SERPL: 33.6 % (ref 20–50)
LDLC SERPL CALC-MCNC: 139.4 MG/DL (ref 63–159)
NONHDLC SERPL-MCNC: 162 MG/DL
POTASSIUM SERPL-SCNC: 4.6 MMOL/L (ref 3.5–5.1)
PROT SERPL-MCNC: 7.6 G/DL (ref 6–8.4)
SODIUM SERPL-SCNC: 138 MMOL/L (ref 136–145)
TRIGL SERPL-MCNC: 113 MG/DL (ref 30–150)

## 2020-06-04 PROCEDURE — 80061 LIPID PANEL: CPT | Mod: HCNC

## 2020-06-04 PROCEDURE — 36415 COLL VENOUS BLD VENIPUNCTURE: CPT | Mod: HCNC,PO

## 2020-06-04 PROCEDURE — 80053 COMPREHEN METABOLIC PANEL: CPT | Mod: HCNC

## 2020-06-05 ENCOUNTER — TELEPHONE (OUTPATIENT)
Dept: FAMILY MEDICINE | Facility: CLINIC | Age: 75
End: 2020-06-05

## 2020-06-05 NOTE — TELEPHONE ENCOUNTER
Patient stated that she is just drinking alkaline water and wants to know what should she do and is there any recommendations that she needs to take before retesting.

## 2020-06-05 NOTE — TELEPHONE ENCOUNTER
----- Message from Marcos Sevilla MD sent at 6/4/2020 11:45 PM CDT -----  Please call the patient regarding her abnormal result.    Slightly Elevated alkaline phosphatase level.  Recommend to recheck lab in one month (LFT and GGT).  Please call her to schedule. Cholesterol is stable.

## 2020-07-20 DIAGNOSIS — I10 ESSENTIAL HYPERTENSION: ICD-10-CM

## 2020-07-20 RX ORDER — ATENOLOL AND CHLORTHALIDONE TABLET 50; 25 MG/1; MG/1
1 TABLET ORAL DAILY
Qty: 90 TABLET | Refills: 1 | Status: SHIPPED | OUTPATIENT
Start: 2020-07-20 | End: 2020-10-12 | Stop reason: SDUPTHER

## 2020-10-12 DIAGNOSIS — I10 ESSENTIAL HYPERTENSION: ICD-10-CM

## 2020-10-12 RX ORDER — ATENOLOL AND CHLORTHALIDONE TABLET 50; 25 MG/1; MG/1
1 TABLET ORAL DAILY
Qty: 90 TABLET | Refills: 1 | Status: SHIPPED | OUTPATIENT
Start: 2020-10-12 | End: 2021-03-03

## 2020-10-12 NOTE — TELEPHONE ENCOUNTER
----- Message from Shelley Loaiza sent at 10/12/2020  1:21 PM CDT -----  Contact: Patient [phone]  Type: RX Refill Request    Who Called: Patient    Have you contacted your pharmacy:Yes    Refill or New Rx:Refill    RX Name and Strength: atenoloL-chlorthalidone (TENORETIC) 50-25 mg Tab    Is this a 30 day or 90 day RX: 90 day    Preferred Pharmacy with phone number: .    [facility]   [phone]     Local or Mail Order:Mail Order    Would the patient rather a call back or a response via My [facility]? Call back    Best Call Back Number: [phone]    Additional Information: Patient would like to start getting her medication through her mail order pharmacy.

## 2020-11-06 ENCOUNTER — OFFICE VISIT (OUTPATIENT)
Dept: FAMILY MEDICINE | Facility: CLINIC | Age: 75
End: 2020-11-06
Payer: MEDICARE

## 2020-11-06 VITALS
BODY MASS INDEX: 32.99 KG/M2 | SYSTOLIC BLOOD PRESSURE: 150 MMHG | HEART RATE: 95 BPM | RESPIRATION RATE: 18 BRPM | OXYGEN SATURATION: 96 % | WEIGHT: 198 LBS | HEIGHT: 65 IN | DIASTOLIC BLOOD PRESSURE: 100 MMHG

## 2020-11-06 DIAGNOSIS — I10 ESSENTIAL HYPERTENSION: ICD-10-CM

## 2020-11-06 DIAGNOSIS — Z23 NEED FOR INFLUENZA VACCINATION: ICD-10-CM

## 2020-11-06 DIAGNOSIS — M25.561 ACUTE PAIN OF RIGHT KNEE: Primary | ICD-10-CM

## 2020-11-06 PROCEDURE — 99999 PR PBB SHADOW E&M-EST. PATIENT-LVL V: CPT | Mod: PBBFAC,HCNC,, | Performed by: NURSE PRACTITIONER

## 2020-11-06 PROCEDURE — 99999 PR PBB SHADOW E&M-EST. PATIENT-LVL V: ICD-10-PCS | Mod: PBBFAC,HCNC,, | Performed by: NURSE PRACTITIONER

## 2020-11-06 NOTE — PROGRESS NOTES
Routine Office Visit    Patient Name: Marsha Chun    : 1945  MRN: [id number]    Chief Complaint:  Left knee pain    Subjective:  Marsha is a 75 y.o. female who presents today for:    1.  Left knee pain - patient reports today for evaluation of left knee pain.  Approximately 4-5 weeks ago she slipped on a step at her home and fell on to her gluteal region.  She states that she has no back or tailbone pain but since the fall she has been having some lateral left knee pain which does not radiate.  The pain is aching and she does not endorse any associated numbness, tingling, or weakness of the left leg.  She has been using a knee sleeve which has helped with the symptoms.  She has also been taking ibuprofen 800 mg and using topical creams over-the-counter which have helped.  Denies any sensation of her knee locking or giving out.  The pain is worse at night and on standing and is somewhat relieved with rest.  She does have a history of hypertension and has not taken her blood pressure medication yet today.  She was given a cup of water and she took her blood pressure medicine at the start of our visit.  She denies any chest pain, shortness of breath, neurologic symptoms, or headaches.  No other acute complaints.    Past Medical History  Past Medical History:   Diagnosis Date    History of TB skin testing     Hyperparathyroidism, unspecified     Hypertension     Osteopenia     Overweight(278.02)        Past Surgical History  Past Surgical History:   Procedure Laterality Date    COLONOSCOPY N/A 10/29/2019    Procedure: COLONOSCOPY;  Surgeon: Amy Alex MD;  Location: [facility];  Service: Endoscopy;  Laterality: N/A;  confirmed appt-sp    lasik and a rotator cuff surgery      SHOULDER SURGERY         Family History  Family History   Problem Relation Age of Onset    Stroke Sister     Diabetes Neg Hx     Thyroid cancer Neg Hx     Osteoporosis Neg Hx        Social History  Social History      Socioeconomic History    Marital status:      Spouse name: Not on file    Number of children: Not on file    Years of education: Not on file    Highest education level: Not on file   Occupational History    Not on file   Social Needs    Financial resource strain: Not on file    Food insecurity     Worry: Not on file     Inability: Not on file    Transportation needs     Medical: Not on file     Non-medical: Not on file   Tobacco Use    Smoking status: Never Smoker    Smokeless tobacco: Never Used   Substance and Sexual Activity    Alcohol use: No     Alcohol/week: 0.0 standard drinks    Drug use: No    Sexual activity: Not on file   Lifestyle    Physical activity     Days per week: Not on file     Minutes per session: Not on file    Stress: Not on file   Relationships    Social connections     Talks on phone: Not on file     Gets together: Not on file     Attends [employer] service: Not on file     Active member of club or organization: Not on file     Attends meetings of clubs or organizations: Not on file     Relationship status: Not on file   Other Topics Concern    Not on file   Social History Narrative    Not on file       Current Medications  Current Outpatient Medications on File Prior to Visit   Medication Sig Dispense Refill    atenoloL-chlorthalidone (TENORETIC) 50-25 mg Tab Take 1 tablet by mouth once daily. 90 tablet 1    cholecalciferol, vitamin D3, (VITAMIN D3) 5,000 unit Tab Take 5,000 Units by mouth once daily.      diclofenac sodium (VOLTAREN) 1 % Gel Apply 2 g topically 3 (three) times daily. 1 Tube 1    fish oil-omega-3 fatty acids 300-1,000 mg capsule Take 2 g by mouth once daily.      ibuprofen (ADVIL,MOTRIN) 800 MG tablet Take 1 tablet (800 mg total) by mouth every 8 (eight) hours as needed for Pain. 20 tablet 0    UNABLE TO FIND medication name: Omega XL      oxybutynin (DITROPAN) 5 MG Tab Take 1 tablet (5 mg total) by mouth 2 (two) times daily. 360 tablet 0  "    No current facility-administered medications on file prior to visit.        Allergies   Review of patient's allergies indicates:  No Known Allergies    Review of Systems (Pertinent positives)  Review of Systems   Constitutional: Negative.    HENT: Negative.    Eyes: Negative for blurred vision, double vision, photophobia, pain, discharge and redness.   Respiratory: Negative for cough, hemoptysis, sputum production, shortness of breath and wheezing.    Cardiovascular: Negative for chest pain, palpitations, orthopnea, claudication, leg swelling and PND.   Gastrointestinal: Negative.    Genitourinary: Negative.    Musculoskeletal: Positive for falls and joint pain. Negative for back pain, myalgias and neck pain.   Skin: Negative.    Neurological: Negative.    Psychiatric/Behavioral: Negative.        BP (!) 150/100 (BP Location: Left arm, Patient Position: Sitting, BP Method: Large (Manual))   Pulse 95   Resp 18   Ht 5' 5" (1.651 m)   Wt 89.8 kg (197 lb 15.6 oz)   SpO2 96%   BMI 32.94 kg/m²     Physical Exam  Vitals signs reviewed.   Constitutional:       General: She is not in acute distress.     Appearance: Normal appearance. She is not ill-appearing, toxic-appearing or diaphoretic.   HENT:      Head: Normocephalic and atraumatic.   Eyes:      Extraocular Movements: Extraocular movements intact.      Conjunctiva/sclera: Conjunctivae normal.      Pupils: Pupils are equal, round, and reactive to light.   Cardiovascular:      Rate and Rhythm: Normal rate and regular rhythm.      Pulses: Normal pulses.      Heart sounds: Normal heart sounds. No murmur. No friction rub. No gallop.    Pulmonary:      Effort: Pulmonary effort is normal. No respiratory distress.      Breath sounds: Normal breath sounds. No stridor. No wheezing, rhonchi or rales.   Chest:      Chest wall: No tenderness.   Abdominal:      General: Bowel sounds are normal.      Palpations: Abdomen is soft.   Musculoskeletal:      Left knee: She " exhibits effusion. She exhibits normal range of motion, no swelling, no ecchymosis, no deformity, no laceration, no erythema, normal alignment, no LCL laxity, normal patellar mobility, no bony tenderness, normal meniscus and no MCL laxity. No tenderness found.      Cervical back: Normal.      Thoracic back: Normal.      Lumbar back: Normal.      Comments: Left knee exhibits no ligament laxity or tenderness, however patient does endorse pain at the patellar tendon with flexion of the left knee and pain at the hamstring tendons with flexion as well   Skin:     General: Skin is warm and dry.      Capillary Refill: Capillary refill takes less than 2 seconds.   Neurological:      General: No focal deficit present.      Mental Status: She is alert and oriented to person, place, and time.      Motor: No weakness.      Coordination: Coordination normal.          Assessment/Plan:  Marsha Chun is a 75 y.o. female who presents today for :    Marsha was seen today for knee pain and injections.    Diagnoses and all orders for this visit:    Acute pain of right knee  -     X-Ray Knee 3 View Left; Future  -     Ambulatory referral/consult to Physical/Occupational Therapy; Future  -     Ambulatory referral/consult to Orthopedics; Future     Will check knee xray today.  Recommended referral to both orthopedics and physical therapy which patient is amenable to.  Continue knee sleeve use continue use of ibuprofen and topical creams which have helped patient.    Need for influenza vaccination  -     Influenza (FLUAD) - Quadrivalent (Adjuvanted) *Preferred* (65+) (PF)    Due for flu shot today will administer.    Essential hypertension    Blood pressure was initially markedly elevated at the start of the visit, however patient states that she did not take her blood pressure medication before coming in.  She was given a cup of water and took her atenolol-chlorthalidone at the beginning of our visit.  Her blood pressure did come  down throughout the visit.  She states that she was rushed this morning and her blood pressure also may be elevated due to pain.  Will have patient follow-up with me in 2 weeks for blood pressure recheck.  Recommended consistent use of blood pressure medications each day.        This office note has been dictated.  This dictation has been generated using M-Modal Fluency Direct dictation; some phonetic errors may occur.   My collaborating physician is Dr. Jam Baez.

## 2020-11-12 ENCOUNTER — OFFICE VISIT (OUTPATIENT)
Dept: ORTHOPEDICS | Facility: CLINIC | Age: 75
End: 2020-11-12
Payer: MEDICARE

## 2020-11-12 VITALS
WEIGHT: 197 LBS | HEART RATE: 68 BPM | DIASTOLIC BLOOD PRESSURE: 80 MMHG | RESPIRATION RATE: 18 BRPM | SYSTOLIC BLOOD PRESSURE: 132 MMHG | HEIGHT: 65 IN | OXYGEN SATURATION: 98 % | BODY MASS INDEX: 32.82 KG/M2

## 2020-11-12 DIAGNOSIS — M25.561 ACUTE PAIN OF RIGHT KNEE: Primary | ICD-10-CM

## 2020-11-12 PROCEDURE — 3079F PR MOST RECENT DIASTOLIC BLOOD PRESSURE 80-89 MM HG: ICD-10-PCS | Mod: HCNC,CPTII,S$GLB, | Performed by: ORTHOPAEDIC SURGERY

## 2020-11-12 PROCEDURE — 1159F MED LIST DOCD IN RCRD: CPT | Mod: HCNC,S$GLB,, | Performed by: ORTHOPAEDIC SURGERY

## 2020-11-12 PROCEDURE — 3008F PR BODY MASS INDEX (BMI) DOCUMENTED: ICD-10-PCS | Mod: HCNC,CPTII,S$GLB, | Performed by: ORTHOPAEDIC SURGERY

## 2020-11-12 PROCEDURE — 99204 OFFICE O/P NEW MOD 45 MIN: CPT | Mod: HCNC,S$GLB,, | Performed by: ORTHOPAEDIC SURGERY

## 2020-11-12 PROCEDURE — 3008F BODY MASS INDEX DOCD: CPT | Mod: HCNC,CPTII,S$GLB, | Performed by: ORTHOPAEDIC SURGERY

## 2020-11-12 PROCEDURE — 99999 PR PBB SHADOW E&M-EST. PATIENT-LVL IV: CPT | Mod: PBBFAC,HCNC,, | Performed by: ORTHOPAEDIC SURGERY

## 2020-11-12 PROCEDURE — 3079F DIAST BP 80-89 MM HG: CPT | Mod: HCNC,CPTII,S$GLB, | Performed by: ORTHOPAEDIC SURGERY

## 2020-11-12 PROCEDURE — 1159F PR MEDICATION LIST DOCUMENTED IN MEDICAL RECORD: ICD-10-PCS | Mod: HCNC,S$GLB,, | Performed by: ORTHOPAEDIC SURGERY

## 2020-11-12 PROCEDURE — 99204 PR OFFICE/OUTPT VISIT, NEW, LEVL IV, 45-59 MIN: ICD-10-PCS | Mod: HCNC,S$GLB,, | Performed by: ORTHOPAEDIC SURGERY

## 2020-11-12 PROCEDURE — 1125F PR PAIN SEVERITY QUANTIFIED, PAIN PRESENT: ICD-10-PCS | Mod: HCNC,S$GLB,, | Performed by: ORTHOPAEDIC SURGERY

## 2020-11-12 PROCEDURE — 3075F PR MOST RECENT SYSTOLIC BLOOD PRESS GE 130-139MM HG: ICD-10-PCS | Mod: HCNC,CPTII,S$GLB, | Performed by: ORTHOPAEDIC SURGERY

## 2020-11-12 PROCEDURE — 99999 PR PBB SHADOW E&M-EST. PATIENT-LVL IV: ICD-10-PCS | Mod: PBBFAC,HCNC,, | Performed by: ORTHOPAEDIC SURGERY

## 2020-11-12 PROCEDURE — 3075F SYST BP GE 130 - 139MM HG: CPT | Mod: HCNC,CPTII,S$GLB, | Performed by: ORTHOPAEDIC SURGERY

## 2020-11-12 PROCEDURE — 1125F AMNT PAIN NOTED PAIN PRSNT: CPT | Mod: HCNC,S$GLB,, | Performed by: ORTHOPAEDIC SURGERY

## 2020-11-12 NOTE — LETTER
November 12, 2020      Spenser Seaman, NP  [address]  [facility] [address]           [facility]  [address], [address]  [place] [address]  Phone: [phone]          Patient: Marsha Chun   MR Number: [id number]   YOB: 1945   Date of Visit: 11/12/2020       Dear Spenser Seaman:    Thank you for referring Marsha Chun to me for evaluation. Attached you will find relevant portions of my assessment and plan of care.    If you have questions, please do not hesitate to call me. I look forward to following Marsha Chun along with you.    Sincerely,    Francisca Lott MD    Enclosure  CC:  No Recipients    If you would like to receive this communication electronically, please contact [email or web address] or [phone] to request more information on [employer] access.    For providers and/or their staff who would like to refer a patient to [facility], please contact us through our one-stop-shop provider referral line, [facility], at [phone].    If you feel you have received this communication in error or would no longer like to receive these types of communications, please e-mail [email or web address]

## 2020-11-12 NOTE — PROGRESS NOTES
Chief Complaint   Patient presents with    Left Knee - Pain     This patient was seen in consultation at the request of Spenser Seaman     [facility] (11/12/2020): Marsha Chun is a 75 y.o. female who presents today complaining of Pain of the Left Knee     Duration of symptoms:  About 1 month  Trauma or new activity: yes - slipped and fell on the left buttock   Since then has had pain over the lateral left knee   Pain is constant  Aggravating factors: weight bearing activity   Relieving factors: compression knee sleeve, rest   Radicular symptoms: no numbness, paresthesias   Associated symptoms:  swelling and popping.    Prior treatment:  prescription NSAIDs with improvement in pain.     Pain does interfere with activities of daily living . - having difficulty getting off there toilet, squatting to  items etc    This is the extent of the patient's complaints at this time.     Review of Systems   All other systems reviewed and are negative.        Review of patient's allergies indicates:  No Known Allergies      Current Outpatient Medications:     atenoloL-chlorthalidone (TENORETIC) 50-25 mg Tab, Take 1 tablet by mouth once daily., Disp: 90 tablet, Rfl: 1    cholecalciferol, vitamin D3, (VITAMIN D3) 5,000 unit Tab, Take 5,000 Units by mouth once daily., Disp: , Rfl:     diclofenac sodium (VOLTAREN) 1 % Gel, Apply 2 g topically 3 (three) times daily., Disp: 1 Tube, Rfl: 1    fish oil-omega-3 fatty acids 300-1,000 mg capsule, Take 2 g by mouth once daily., Disp: , Rfl:     ibuprofen (ADVIL,MOTRIN) 800 MG tablet, Take 1 tablet (800 mg total) by mouth every 8 (eight) hours as needed for Pain., Disp: 20 tablet, Rfl: 0    oxybutynin (DITROPAN) 5 MG Tab, Take 1 tablet (5 mg total) by mouth 2 (two) times daily., Disp: 360 tablet, Rfl: 0    UNABLE TO FIND, medication name: Omega XL, Disp: , Rfl:     Past Medical History:   Diagnosis Date    History of TB skin testing     Hyperparathyroidism, unspecified      Hypertension     Osteopenia     Overweight(278.02)        Patient Active Problem List   Diagnosis    Hypertension    History of transient ischemic attack (TIA)    Hyperparathyroidism, primary    Osteopenia    Hyperparathyroidism, unspecified    Closed nondisplaced fracture of base of fifth metacarpal bone of right hand with routine healing    Sprain of left wrist    Sprain of anterior talofibular ligament of left ankle    Joint stiffness    Joint pain    Weakness    OAB (overactive bladder)    Breast cancer screening    Screen for STD (sexually transmitted disease)    Mixed hyperlipidemia    Obesity (BMI 30.0-34.9)    Acute pain of left knee    Left leg pain    Numerous moles    Dislocation of left wrist    Colon cancer screening    Diverticulosis    Osteoarthritis of left knee       Past Surgical History:   Procedure Laterality Date    COLONOSCOPY N/A 10/29/2019    Procedure: COLONOSCOPY;  Surgeon: Amy Alex MD;  Location: [facility];  Service: Endoscopy;  Laterality: N/A;  confirmed appt-sp    lasik and a rotator cuff surgery      SHOULDER SURGERY         Social History     Tobacco Use    Smoking status: Never Smoker    Smokeless tobacco: Never Used   Substance Use Topics    Alcohol use: No     Alcohol/week: 0.0 standard drinks    Drug use: No       Family History   Problem Relation Age of Onset    Stroke Sister     Diabetes Neg Hx     Thyroid cancer Neg Hx     Osteoporosis Neg Hx        Physical Exam:   There were no vitals filed for this visit.    General:   There is no height or weight on file to calculate BMI.  Patient is alert, awake and oriented to time, place and person. Mood and affect are appropriate.  Patient does not appear to be in any distress, denies any constitutional symptoms and appears stated age.   HEENT: Pupils are equal and round, sclera are not injected. External examination of ears and nose reveals no abnormalities. Cranial nerves II-X are grossly  intact  Skin: no rashes, abrasions or open wounds on the affected extremity   Resp: No respiratory distress or audible wheezing   CV: 2+  pulses, all extremities warm and well perfused     Left knee(s)  Localizes pain over LJL   no swelling, effusion, or erythema   Active ROM: 0 - 140  Passive ROM: 0 - 145  Mild correctalke varus deformity   Tender to palpation over medial joint line  and lateral joint line   fair  quadricep muscle tone and bulk; moderate atrophy compared to contralateral extremity   normal (0 - 2 mm) Anterior drawer   normal (0 - 2 mm) posterior drawer   negative valgus instability   negative varus instability   normal patellar tracking   No pain with patellar compression   Ltsi s/s/sp/dp/t  + ehl/fhl/ta/gs  2+ DP    Imaging:  3 views left knee:  severe tricompartmental OA with subchondral sclerosis, joint space narrowing, osteophyte formation and loose bodies.      I personally reviewed and interpreted the patient's imaging obtained today in clinic     Assessment: 75 y.o. female with Left knee osteoarthritis     We discussed the findings on xray and clinical exam as well as the natural history of arthritis. We discussed non operative and operative treatment options including injections, viscosupplementation, physical therapy and PO NSAIDs.  She would like to start with non-operative treatment in the form of PT.     Plan:   - Continue Ibuprofen 800 mg   - Demonstrated proper squatting technique  - PT referral sent   - Injection declined   - Return to clinic PRN    All questions were answered in detail. The patient is in full agreement with the treatment plan and will proceed accordingly.    A note notifying Spenser Seaman of my findings was sent via the electronic medical record     This note was created by combination of typed  and M-Modal dictation. Transcription and phonetic errors may be present.  If there are any questions, please contact me.      Current Outpatient Medications:      atenoloL-chlorthalidone (TENORETIC) 50-25 mg Tab, Take 1 tablet by mouth once daily., Disp: 90 tablet, Rfl: 1    cholecalciferol, vitamin D3, (VITAMIN D3) 5,000 unit Tab, Take 5,000 Units by mouth once daily., Disp: , Rfl:     diclofenac sodium (VOLTAREN) 1 % Gel, Apply 2 g topically 3 (three) times daily., Disp: 1 Tube, Rfl: 1    fish oil-omega-3 fatty acids 300-1,000 mg capsule, Take 2 g by mouth once daily., Disp: , Rfl:     ibuprofen (ADVIL,MOTRIN) 800 MG tablet, Take 1 tablet (800 mg total) by mouth every 8 (eight) hours as needed for Pain., Disp: 20 tablet, Rfl: 0    oxybutynin (DITROPAN) 5 MG Tab, Take 1 tablet (5 mg total) by mouth 2 (two) times daily., Disp: 360 tablet, Rfl: 0    UNABLE TO FIND, medication name: Omega XL, Disp: , Rfl:

## 2020-11-20 ENCOUNTER — OFFICE VISIT (OUTPATIENT)
Dept: FAMILY MEDICINE | Facility: CLINIC | Age: 75
End: 2020-11-20
Payer: MEDICARE

## 2020-11-20 VITALS
SYSTOLIC BLOOD PRESSURE: 126 MMHG | RESPIRATION RATE: 17 BRPM | DIASTOLIC BLOOD PRESSURE: 82 MMHG | HEART RATE: 68 BPM | OXYGEN SATURATION: 97 % | TEMPERATURE: 98 F

## 2020-11-20 DIAGNOSIS — I10 ESSENTIAL HYPERTENSION: Primary | ICD-10-CM

## 2020-11-20 DIAGNOSIS — M17.12 OSTEOARTHRITIS OF LEFT KNEE, UNSPECIFIED OSTEOARTHRITIS TYPE: ICD-10-CM

## 2020-11-20 PROBLEM — Z12.11 COLON CANCER SCREENING: Status: RESOLVED | Noted: 2019-10-29 | Resolved: 2020-11-20

## 2020-11-20 PROBLEM — R53.1 WEAKNESS: Status: RESOLVED | Noted: 2018-04-19 | Resolved: 2020-11-20

## 2020-11-20 PROBLEM — Z11.3 SCREEN FOR STD (SEXUALLY TRANSMITTED DISEASE): Status: RESOLVED | Noted: 2018-09-10 | Resolved: 2020-11-20

## 2020-11-20 PROCEDURE — 99213 OFFICE O/P EST LOW 20 MIN: CPT | Mod: HCNC,S$GLB,, | Performed by: NURSE PRACTITIONER

## 2020-11-20 PROCEDURE — 3074F SYST BP LT 130 MM HG: CPT | Mod: HCNC,CPTII,S$GLB, | Performed by: NURSE PRACTITIONER

## 2020-11-20 PROCEDURE — 1159F PR MEDICATION LIST DOCUMENTED IN MEDICAL RECORD: ICD-10-PCS | Mod: HCNC,S$GLB,, | Performed by: NURSE PRACTITIONER

## 2020-11-20 PROCEDURE — 99213 PR OFFICE/OUTPT VISIT, EST, LEVL III, 20-29 MIN: ICD-10-PCS | Mod: HCNC,S$GLB,, | Performed by: NURSE PRACTITIONER

## 2020-11-20 PROCEDURE — 99999 PR PBB SHADOW E&M-EST. PATIENT-LVL III: CPT | Mod: PBBFAC,HCNC,, | Performed by: NURSE PRACTITIONER

## 2020-11-20 PROCEDURE — 3079F DIAST BP 80-89 MM HG: CPT | Mod: HCNC,CPTII,S$GLB, | Performed by: NURSE PRACTITIONER

## 2020-11-20 PROCEDURE — 1159F MED LIST DOCD IN RCRD: CPT | Mod: HCNC,S$GLB,, | Performed by: NURSE PRACTITIONER

## 2020-11-20 PROCEDURE — 99999 PR PBB SHADOW E&M-EST. PATIENT-LVL III: ICD-10-PCS | Mod: PBBFAC,HCNC,, | Performed by: NURSE PRACTITIONER

## 2020-11-20 PROCEDURE — 3074F PR MOST RECENT SYSTOLIC BLOOD PRESSURE < 130 MM HG: ICD-10-PCS | Mod: HCNC,CPTII,S$GLB, | Performed by: NURSE PRACTITIONER

## 2020-11-20 PROCEDURE — 3079F PR MOST RECENT DIASTOLIC BLOOD PRESSURE 80-89 MM HG: ICD-10-PCS | Mod: HCNC,CPTII,S$GLB, | Performed by: NURSE PRACTITIONER

## 2020-11-20 NOTE — PROGRESS NOTES
Routine Office Visit    Patient Name: Marsha Chun    : 1945  MRN: [id number]    Chief Complaint:  Blood pressure follow-up    Subjective:  Marsha is a 75 y.o. female who presents today for:    1.  Blood pressure follow-up - patient presents today for blood pressure follow-up.  Has been compliant with her BP medications.  Denies any chest pain, shortness of breath, headache, wheezing, or other cardiac, neuro, or respiratory symptoms at today's visit.  She is still dealing with occasional knee pain and is set to start physical therapy on the  for this.  She denies any other acute complaints today.    Past Medical History  Past Medical History:   Diagnosis Date    History of TB skin testing     Hyperparathyroidism, unspecified     Hypertension     Osteopenia     Overweight(278.02)        Past Surgical History  Past Surgical History:   Procedure Laterality Date    COLONOSCOPY N/A 10/29/2019    Procedure: COLONOSCOPY;  Surgeon: Amy Alex MD;  Location: [facility];  Service: Endoscopy;  Laterality: N/A;  confirmed appt-sp    lasik and a rotator cuff surgery      SHOULDER SURGERY         Family History  Family History   Problem Relation Age of Onset    Stroke Sister     Diabetes Neg Hx     Thyroid cancer Neg Hx     Osteoporosis Neg Hx        Social History  Social History     Socioeconomic History    Marital status:      Spouse name: Not on file    Number of children: Not on file    Years of education: Not on file    Highest education level: Not on file   Occupational History    Not on file   Social Needs    Financial resource strain: Not on file    Food insecurity     Worry: Not on file     Inability: Not on file    Transportation needs     Medical: Not on file     Non-medical: Not on file   Tobacco Use    Smoking status: Never Smoker    Smokeless tobacco: Never Used   Substance and Sexual Activity    Alcohol use: No     Alcohol/week: 0.0 standard drinks    Drug  use: No    Sexual activity: Not on file   Lifestyle    Physical activity     Days per week: Not on file     Minutes per session: Not on file    Stress: Not on file   Relationships    Social connections     Talks on phone: Not on file     Gets together: Not on file     Attends Denominational service: Not on file     Active member of club or organization: Not on file     Attends meetings of clubs or organizations: Not on file     Relationship status: Not on file   Other Topics Concern    Not on file   Social History Narrative    Not on file       Current Medications  Current Outpatient Medications on File Prior to Visit   Medication Sig Dispense Refill    atenoloL-chlorthalidone (TENORETIC) 50-25 mg Tab Take 1 tablet by mouth once daily. 90 tablet 1    cholecalciferol, vitamin D3, (VITAMIN D3) 5,000 unit Tab Take 5,000 Units by mouth once daily.      diclofenac sodium (VOLTAREN) 1 % Gel Apply 2 g topically 3 (three) times daily. 1 Tube 1    ibuprofen (ADVIL,MOTRIN) 800 MG tablet Take 1 tablet (800 mg total) by mouth every 8 (eight) hours as needed for Pain. 20 tablet 0    fish oil-omega-3 fatty acids 300-1,000 mg capsule Take 2 g by mouth once daily.      oxybutynin (DITROPAN) 5 MG Tab Take 1 tablet (5 mg total) by mouth 2 (two) times daily. (Patient not taking: Reported on 11/20/2020) 360 tablet 0    UNABLE TO FIND medication name: Omega XL       No current facility-administered medications on file prior to visit.        Allergies   Review of patient's allergies indicates:  No Known Allergies    Review of Systems (Pertinent positives)  Review of Systems   Constitutional: Negative.    HENT: Negative.    Eyes: Negative.    Respiratory: Negative.    Cardiovascular: Negative.    Gastrointestinal: Negative.    Genitourinary: Negative.    Musculoskeletal: Positive for joint pain (left knee). Negative for back pain, falls, myalgias and neck pain.   Skin: Negative.    Neurological: Negative.    Endo/Heme/Allergies:  Negative.    Psychiatric/Behavioral: Negative.        /82 (BP Location: Left arm, Patient Position: Sitting, BP Method: Medium (Manual))   Pulse 68   Temp 98.3 °F (36.8 °C) (Oral)   Resp 17   SpO2 97%     Physical Exam  Vitals signs reviewed.   Constitutional:       General: She is not in acute distress.     Appearance: Normal appearance. She is not ill-appearing, toxic-appearing or diaphoretic.   Eyes:      Extraocular Movements: Extraocular movements intact.      Conjunctiva/sclera: Conjunctivae normal.      Pupils: Pupils are equal, round, and reactive to light.   Neck:      Musculoskeletal: Normal range of motion and neck supple.   Cardiovascular:      Rate and Rhythm: Normal rate and regular rhythm.      Pulses: Normal pulses.      Heart sounds: Normal heart sounds. No murmur. No friction rub. No gallop.    Pulmonary:      Effort: Pulmonary effort is normal. No respiratory distress.      Breath sounds: Normal breath sounds. No stridor. No wheezing, rhonchi or rales.   Chest:      Chest wall: No tenderness.   Musculoskeletal: Normal range of motion.         General: No swelling or tenderness.   Skin:     General: Skin is warm and dry.      Capillary Refill: Capillary refill takes less than 2 seconds.   Neurological:      General: No focal deficit present.      Mental Status: She is alert and oriented to person, place, and time.      Sensory: No sensory deficit.      Coordination: Coordination normal.   Psychiatric:         Mood and Affect: Mood normal.         Behavior: Behavior normal.          Assessment/Plan:  Marsha Chun is a 75 y.o. female who presents today for :    Diagnoses and all orders for this visit:    Essential hypertension    Controlled at current visit. No alarm signs or symptoms. Continue current medications follow up with PCP at next scheduled visit.    Osteoarthritis of left knee, unspecified osteoarthritis type    Stable symptoms. Continue f/u with PT and ortho as  needed.        This office note has been dictated.  This dictation has been generated using M-Modal Fluency Direct dictation; some phonetic errors may occur.   My collaborating physician is Dr. Jam Baez.

## 2020-11-24 DIAGNOSIS — E21.0 HYPERPARATHYROIDISM, PRIMARY: Primary | ICD-10-CM

## 2020-11-24 DIAGNOSIS — R74.8 ELEVATED ALKALINE PHOSPHATASE LEVEL: ICD-10-CM

## 2020-11-25 ENCOUNTER — CLINICAL SUPPORT (OUTPATIENT)
Dept: REHABILITATION | Facility: HOSPITAL | Age: 75
End: 2020-11-25
Payer: MEDICARE

## 2020-11-25 DIAGNOSIS — R26.2 DIFFICULTY WALKING: ICD-10-CM

## 2020-11-25 DIAGNOSIS — M25.561 ACUTE PAIN OF RIGHT KNEE: ICD-10-CM

## 2020-11-25 DIAGNOSIS — M25.662 DECREASED RANGE OF MOTION (ROM) OF LEFT KNEE: ICD-10-CM

## 2020-11-25 PROCEDURE — 97161 PT EVAL LOW COMPLEX 20 MIN: CPT | Mod: HCNC,PN

## 2020-11-25 PROCEDURE — 97110 THERAPEUTIC EXERCISES: CPT | Mod: HCNC,PN

## 2020-11-25 NOTE — PLAN OF CARE
[facility]  Physical Therapy Initial Evaluation    Name: Marsha Chun  Clinic Number: [id number]    Therapy Diagnosis:   Encounter Diagnoses   Name Primary?    Acute pain of right knee     Decreased range of motion (ROM) of left knee     Difficulty walking      Physician: Spenser Seaman NP    Physician Orders: PT Eval and Treat   Medical Diagnosis from Referral:    M25.561 (ICD-10-CM) - Acute pain of right knee    Evaluation Date: 11/25/2020  Plan of Care Expiration: 2/25/20    Authorization Period Expiration: 12/25/20  Visit # / Visits authorized: 1/ 1      Time In: 1300  Time Out: 1345    Total Billable Time: 45 minutes    Precautions: none    Subjective   Date of onset: one month    History of current condition:   Marhsa  is a 75 year old female presenting with c/o left posterior knee pain.   She reports a traumatic onset s/p fall last month slipping walking down 4 steps at her home.  She states she was rushing with a purse and water to get out of her home. She states she fell backward onto her behind. She denies a history of falls.  She presents with a SPC that she has been using since she fell. She also presents in a knee sleeve with rubber medial/lateral support. She states the knee sleeve has been helping. Her goal is to regain full functional mobility of the left knee. She would like to walk without cane or a knee sleeve. She would also like ride her bike.      Medical History:   Past Medical History:   Diagnosis Date    History of TB skin testing     Hyperparathyroidism, unspecified     Hypertension     Osteopenia     Overweight(278.02)        Surgical History:   Marsha Chun  has a past surgical history that includes lasik and a rotator cuff surgery; Shoulder surgery; and Colonoscopy (N/A, 10/29/2019).    Medications:   Marsha has a current medication list which includes the following prescription(s): atenolol-chlorthalidone, cholecalciferol (vitamin  d3), diclofenac sodium, fish oil-omega-3 fatty acids, ibuprofen, oxybutynin, and UNABLE TO FIND.    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  No fracture or dislocation.  No bone destruction identified    Prior Therapy: hand therapy 2 years ago, left knee a few years ago  Social History:  Lives with family  Occupation: [employer]  Prior Level of Function: independent  Current Level of Function: SPC    Pain:  Current 1/10, worst 9/10, best 1/10   Location: left posterior lateral knee     Aggravating Factors: walking, standing, stooping, squatting, transitional movements  Easing Factors: pain medication    Pts goals:  Her goal is to regain full functional mobility of the left knee. She would like to walk without cane or a knee sleeve. She would also like ride her bike.         Objective     Gait: pt presents ambulating with an antalgic gait, decreased stance on left LE. SPC  Observation: pt stands weightbearing through right LE, fair VMO response. No sign of swelling  Palpation: lateral joint line tenderness, distal lateral hamstrings  Sensation: Intact    Range of Motion/Strength:   .  Knee  Right   Left      AROM  MMT  AROM  MMT    Flexion  130 4 90 3+   Extension  0 4 -2 3+       Bed Mobility:Independent   Transfers: Independent     CMS Impairment/Limitation/Restriction for FOTO Knee Survey  Status Limitation G-Code CMS Severity Modifier  Intake 44% 56% Current Status CK - At least 40 percent but less than 60 percent  Predicted 60% 40% Goal Status+ CK - At least 40 percent but less than 60 percent    TREATMENT     Treatment Time In: 1330  Treatment Time Out: 1345    Total Treatment time separate from Evaluation: 15 minutes    Marsha received therapeutic exercises to develop strength, endurance, ROM, flexibility, posture and core stabilization for 10 minutes  including:   -quad set 2 x 10  -SLR 2 x 5  -heel slide x 10  -towel stretch 30 sec x 3      Marsha received the following manual therapy techniques:  were applied to the: left knee for 5 minutes, including:  -tibiofemoral distraction    Education provided:   - HEP compliance    Written Home Exercises Provided: yes.    Exercises were reviewed and Marsha was able to demonstrate them prior to the end of the session.  Marsha demonstrated good  understanding of the education provided.     See EMR under Patient Instructions for exercises provided 11/25/2020.    Assessment     Pt presents with signs and symptoms consistent with referring diagnosis. Evaluation has determined a decrease in functional status and subjective and objective deficits that can be addressed by physical therapy intervention. Pt demonstrates pain limiting functional activities. Decreased flexibility and strength limiting normal movement patterns. Decreased segmental motion. Decreased postural strength and awareness. Positive special testing. Decreased participation in functional and recreational activities. Subjective and objective measures are addressed by goals in the plan of care.  Patient/family are involved in the development of these goals. Patient/family are educated about current injury and treatment.       Plan of care was dicussed with patient. Pt will benefit from skilled outpatient Physical Therapy to address the deficits stated above and in the chart below, provide pt/family education, and to maximize pt's level of independence. Pt's spiritual, cultural and educational needs considered and patient is agreeable to the plan of care and goals as stated below:     Pt prognosis is Good.  Anticipated Barriers for therapy: none    Medical Necessity is demonstrated by the following  History  Co-morbidities and personal factors that may impact the plan of care Co-morbidities:   Hyperparathyroidism, unspecified   Hypertension   Osteopenia    Overweight(278.02)       Personal Factors:   no deficits     low   Examination  Body Structures and Functions, activity limitations and participation restrictions that may impact the plan of care Body Regions:   lower extremities    Body Systems:    gross symmetry  ROM  strength  balance  gait  transitions    Participation Restrictions:   Housework, shopping, exercise    Activity limitations:   Learning and applying knowledge  no deficits    General Tasks and Commands  no deficits    Communication  no deficits    Mobility  lifting and carrying objects  walking    Self care  no deficits    Domestic Life  shopping  cooking  doing house work (cleaning house, washing dishes, laundry)    Interactions/Relationships  no deficits    Life Areas  no deficits    Community and Social Life  community life  recreation and leisure         low   Clinical Presentation stable and uncomplicated low   Decision Making/ Complexity Score: low     Goals:    Short Term Goals (4 Weeks):     1.Pt to increase strength by a 1/2 grade of muscles test to allow for improvement in functional activities such as performing chores.  2.Pt to improve range of motion by 25% to allow for improved functional mobility to allow for improvement in IADLs.   3.Pt to report compliance with HEP and demonstrate proper exercise technique to PT to show competence with self management of condition.  4.Decrease pain by 25% during functional activities.    Long Term Goals (12 Weeks):     1. Increase ROM to allow improved joint biomechanics during functional activities.   2.Increase trunk and upper extremity strength to within normal limits during functional activities.   3. Independent with home exercise program.   4. Full return to functional activities with manageable complaints.  5. Patient to demonstrate improved posture and body mechanics.  6. Decrease pain by 75% during functional activities.    Plan     Plan of care Certification: 11/25/2020 to  2/25/21.    Recommended Treatment Plan: 2 times per week for 12 weeks with treatments to consist of:  Neuromuscular and postural re-education,  training, therapeutic exercise, therapeutic activities,balance training, gait training, manual therapy, soft tissue mobilization, ROM exercises, Cardiovascular,  Postural stabilization, manual traction, spinal mobilization, moist heat, cryotherapy, electrical stimulation, ultrasound, home exercise education and planning.    Delmar Owusu, PT

## 2020-11-30 DIAGNOSIS — M17.12 OSTEOARTHRITIS OF LEFT KNEE, UNSPECIFIED OSTEOARTHRITIS TYPE: ICD-10-CM

## 2020-11-30 RX ORDER — DICLOFENAC SODIUM 10 MG/G
2 GEL TOPICAL 3 TIMES DAILY
Qty: 1 TUBE | Refills: 1 | Status: SHIPPED | OUTPATIENT
Start: 2020-11-30 | End: 2021-01-19 | Stop reason: SDUPTHER

## 2020-11-30 NOTE — TELEPHONE ENCOUNTER
----- Message from PhilipJorge Luiskari Vela sent at 11/30/2020 10:51 AM CST -----  Regarding: Medication  Contact: Patient  Type:  Pharmacy Calling to Clarify an RX    Name of Caller: Patient    Pharm[facility] Name: [facility]    Prescription Name: Voltaren 1% gel    What do they need to clarify? She is requesting a refill. She states [facility] will sent documentation about medication for her leg.    Best Call Back Number: [phone] (home)     Additional:   [facility] - [place], [place] - [address]  [address] [address]  Phone: [phone] Fax: [phone]

## 2020-12-01 ENCOUNTER — CLINICAL SUPPORT (OUTPATIENT)
Dept: REHABILITATION | Facility: HOSPITAL | Age: 75
End: 2020-12-01
Payer: MEDICARE

## 2020-12-01 DIAGNOSIS — M25.662 DECREASED RANGE OF MOTION (ROM) OF LEFT KNEE: ICD-10-CM

## 2020-12-01 DIAGNOSIS — R26.2 DIFFICULTY WALKING: ICD-10-CM

## 2020-12-01 PROCEDURE — 97110 THERAPEUTIC EXERCISES: CPT | Mod: HCNC,PN

## 2020-12-01 NOTE — PROGRESS NOTES
Physical Therapy Daily Treatment Note     Name: Marsha Lopez Kent  Clinic Number: [id number]    Therapy Diagnosis:   Encounter Diagnoses   Name Primary?    Decreased range of motion (ROM) of left knee     Difficulty walking      Physician: Francisca Lott MD    Visit Date: 12/1/2020    Physician Orders: PT Eval and Treat   Medical Diagnosis from Referral:    M25.561 (ICD-10-CM) - Acute pain of right knee     Evaluation Date: 11/25/2020  Plan of Care Expiration: 2/25/20     Authorization Period Expiration: 12/25/20  Visit # / Visits authorized: 1/ 1      Time In: 0915  Time Out: 1000    Total Billable Time: 45 minutes    Precautions: Standard, Fall risk    Subjective     Pt reports: the knee is doing a little better. .  She was compliant with home exercise program.  Response to previous treatment:  good  Functional change: none to note    Pain: 6/10  Location: left knee     Objective     Marsha received therapeutic exercises to develop strength, endurance, ROM, flexibility, posture and core stabilization for 40 minutes including:     -Nu step x 6 min  -gastroc stretch on wedge 30 sec x 3  -gastroc stretch vs table 30 sec x 3  -heel raises 3 x 10  -quad set 2 x 10  - 2 way SLR 2 x 8  -heel slide x 10  -towel stretch 30 sec x 3        Marsha received the following manual therapy techniques:  were applied to the: left knee for 5 minutes, including:  -tibiofemoral distraction     Education provided:   - HEP compliance      Written Home Exercises Provided: Patient instructed to cont prior HEP.  Exercises were reviewed and Marsha was able to demonstrate them prior to the end of the session.  Marsha demonstrated good  understanding of the education provided.     See EMR under Patient Instructions for exercises provided 12/1/2020.    Assessment     Pt continues to ambulate with an antalgic gait, decreased stance on left LE with a SPC.  Improved VMO response.  No c/o increased discomfort with prescribed activities.  Good response to initiation of CK activities. Marsha is progressing well towards her goals.     Pt prognosis is Good.     Pt will continue to benefit from skilled outpatient physical therapy to address the deficits listed in the problem list box on initial evaluation, provide pt/family education and to maximize pt's level of independence in the home and community environment.     Pt's spiritual, cultural and educational needs considered and pt agreeable to plan of care and goals.     Anticipated barriers to physical therapy: none    Short Term Goals (4 Weeks):     1.Pt to increase strength by a 1/2 grade of muscles test to allow for improvement in functional activities such as performing chores.  2.Pt to improve range of motion by 25% to allow for improved functional mobility to allow for improvement in IADLs.   3.Pt to report compliance with HEP and demonstrate proper exercise technique to PT to show competence with self management of condition.  4.Decrease pain by 25% during functional activities.    Long Term Goals (12 Weeks):     1. Increase ROM to allow improved joint biomechanics during functional activities.   2.Increase trunk and lower extremity strength to within normal limits during functional activities.   3. Independent with home exercise program.   4. Full return to functional activities with manageable complaints.  5. Patient to demonstrate improved posture and body mechanics.  6. Decrease pain by 75% during functional activities.         Plan       Recommended Treatment Plan: 2-3 times per week for 12 weeks with treatments to consist of:  Neuromuscular and postural re-education,  training, therapeutic exercise, therapeutic activities,balance training, gait training, manual therapy, soft tissue mobilization, ROM exercises, Cardiovascular,  Postural stabilization, manual traction, spinal mobilization, moist heat, cryotherapy, electrical stimulation, ultrasound, home exercise education and  planning.    Continue with established Plan of Care towards PT goals.     Delmar Owusu, PT

## 2020-12-02 ENCOUNTER — OFFICE VISIT (OUTPATIENT)
Dept: ENDOCRINOLOGY | Facility: CLINIC | Age: 75
End: 2020-12-02
Payer: MEDICARE

## 2020-12-02 VITALS
HEIGHT: 65 IN | HEART RATE: 68 BPM | DIASTOLIC BLOOD PRESSURE: 77 MMHG | OXYGEN SATURATION: 95 % | SYSTOLIC BLOOD PRESSURE: 120 MMHG | TEMPERATURE: 98 F | BODY MASS INDEX: 32.32 KG/M2 | RESPIRATION RATE: 18 BRPM | WEIGHT: 194 LBS

## 2020-12-02 DIAGNOSIS — M85.80 OSTEOPENIA, UNSPECIFIED LOCATION: ICD-10-CM

## 2020-12-02 DIAGNOSIS — E21.0 HYPERPARATHYROIDISM, PRIMARY: Primary | ICD-10-CM

## 2020-12-02 DIAGNOSIS — R79.9 ABNORMAL FINDING OF BLOOD CHEMISTRY, UNSPECIFIED: ICD-10-CM

## 2020-12-02 DIAGNOSIS — E55.9 VITAMIN D DEFICIENCY: ICD-10-CM

## 2020-12-02 DIAGNOSIS — E66.9 OBESITY (BMI 30.0-34.9): ICD-10-CM

## 2020-12-02 DIAGNOSIS — R74.8 ELEVATED ALKALINE PHOSPHATASE LEVEL: ICD-10-CM

## 2020-12-02 DIAGNOSIS — I10 ESSENTIAL HYPERTENSION: ICD-10-CM

## 2020-12-02 DIAGNOSIS — E66.9 OBESITY, UNSPECIFIED CLASSIFICATION, UNSPECIFIED OBESITY TYPE, UNSPECIFIED WHETHER SERIOUS COMORBIDITY PRESENT: ICD-10-CM

## 2020-12-02 DIAGNOSIS — I10 ESSENTIAL (PRIMARY) HYPERTENSION: ICD-10-CM

## 2020-12-02 PROCEDURE — 99214 OFFICE O/P EST MOD 30 MIN: CPT | Mod: HCNC,S$GLB,, | Performed by: HOSPITALIST

## 2020-12-02 PROCEDURE — 1100F PR PT FALLS ASSESS DOC 2+ FALLS/FALL W/INJURY/YR: ICD-10-PCS | Mod: HCNC,CPTII,S$GLB, | Performed by: HOSPITALIST

## 2020-12-02 PROCEDURE — 3074F SYST BP LT 130 MM HG: CPT | Mod: HCNC,CPTII,S$GLB, | Performed by: HOSPITALIST

## 2020-12-02 PROCEDURE — 3074F PR MOST RECENT SYSTOLIC BLOOD PRESSURE < 130 MM HG: ICD-10-PCS | Mod: HCNC,CPTII,S$GLB, | Performed by: HOSPITALIST

## 2020-12-02 PROCEDURE — 3008F BODY MASS INDEX DOCD: CPT | Mod: HCNC,CPTII,S$GLB, | Performed by: HOSPITALIST

## 2020-12-02 PROCEDURE — 1126F AMNT PAIN NOTED NONE PRSNT: CPT | Mod: HCNC,S$GLB,, | Performed by: HOSPITALIST

## 2020-12-02 PROCEDURE — 3008F PR BODY MASS INDEX (BMI) DOCUMENTED: ICD-10-PCS | Mod: HCNC,CPTII,S$GLB, | Performed by: HOSPITALIST

## 2020-12-02 PROCEDURE — 3078F PR MOST RECENT DIASTOLIC BLOOD PRESSURE < 80 MM HG: ICD-10-PCS | Mod: HCNC,CPTII,S$GLB, | Performed by: HOSPITALIST

## 2020-12-02 PROCEDURE — 1159F PR MEDICATION LIST DOCUMENTED IN MEDICAL RECORD: ICD-10-PCS | Mod: HCNC,S$GLB,, | Performed by: HOSPITALIST

## 2020-12-02 PROCEDURE — 99214 PR OFFICE/OUTPT VISIT, EST, LEVL IV, 30-39 MIN: ICD-10-PCS | Mod: HCNC,S$GLB,, | Performed by: HOSPITALIST

## 2020-12-02 PROCEDURE — 99499 UNLISTED E&M SERVICE: CPT | Mod: S$GLB,,, | Performed by: HOSPITALIST

## 2020-12-02 PROCEDURE — 3288F FALL RISK ASSESSMENT DOCD: CPT | Mod: HCNC,CPTII,S$GLB, | Performed by: HOSPITALIST

## 2020-12-02 PROCEDURE — 1159F MED LIST DOCD IN RCRD: CPT | Mod: HCNC,S$GLB,, | Performed by: HOSPITALIST

## 2020-12-02 PROCEDURE — 1126F PR PAIN SEVERITY QUANTIFIED, NO PAIN PRESENT: ICD-10-PCS | Mod: HCNC,S$GLB,, | Performed by: HOSPITALIST

## 2020-12-02 PROCEDURE — 99999 PR PBB SHADOW E&M-EST. PATIENT-LVL V: ICD-10-PCS | Mod: PBBFAC,HCNC,, | Performed by: HOSPITALIST

## 2020-12-02 PROCEDURE — 99499 RISK ADDL DX/OHS AUDIT: ICD-10-PCS | Mod: S$GLB,,, | Performed by: HOSPITALIST

## 2020-12-02 PROCEDURE — 1100F PTFALLS ASSESS-DOCD GE2>/YR: CPT | Mod: HCNC,CPTII,S$GLB, | Performed by: HOSPITALIST

## 2020-12-02 PROCEDURE — 99999 PR PBB SHADOW E&M-EST. PATIENT-LVL V: CPT | Mod: PBBFAC,HCNC,, | Performed by: HOSPITALIST

## 2020-12-02 PROCEDURE — 3288F PR FALLS RISK ASSESSMENT DOCUMENTED: ICD-10-PCS | Mod: HCNC,CPTII,S$GLB, | Performed by: HOSPITALIST

## 2020-12-02 PROCEDURE — 3078F DIAST BP <80 MM HG: CPT | Mod: HCNC,CPTII,S$GLB, | Performed by: HOSPITALIST

## 2020-12-02 RX ORDER — ACETAMINOPHEN 500 MG
5000 TABLET ORAL DAILY
Qty: 30 TABLET | Refills: 11 | Status: SHIPPED | OUTPATIENT
Start: 2020-12-02

## 2020-12-02 RX ORDER — AMLODIPINE BESYLATE 5 MG/1
5 TABLET ORAL DAILY
Qty: 30 TABLET | Refills: 11 | Status: SHIPPED | OUTPATIENT
Start: 2020-12-02 | End: 2021-10-05 | Stop reason: SDUPTHER

## 2020-12-02 NOTE — Clinical Note
I saw the patient today for hyperparathyroidism, has an issue with her Voltaren gel, she asked me to message to to get new refills.

## 2020-12-02 NOTE — ASSESSMENT & PLAN NOTE
Patient with primary hyperparathyroidism, with remote history of hypercalcemia, per chart review resolve with stopping of HCTZ   Calcium lab work recently have all been normal  Medications were reviewed:  Patient is on chlorthalidone, which can affect urine calcium  Dietary reviewed: not contributing  DXA with osteopenia in 2019  Mechanism of hyperparathyroidism leading to hypercalcemia was explained to pt, all questions were answered    Work up  - Checking TSH, Vit D, Renal function panel, PTH  - Checking urine Ca and Cr to monitor for hypercalciuria/ or FHH  - Check DXA , to be done on February, 2021    Plan  - Pending work up above  - Discussed indications for surgery if primary hyperparathyroidism worsens bone density  - Consider surgical therapy: Will need to get repeat NM Parathyroid scan, US thyroid, CT 4D Scan  - Advised to avoid dehydration, excessive calcium supplementations and avoid medication like HCTZ/Lithium that can worsen hypercalcemia.  - Follow up after results reviewed

## 2020-12-02 NOTE — PROGRESS NOTES
"Subjective:      Patient ID: Marsha Chun is a 75 y.o. female presented to Endocrinology clinic on 12/2/2020.    Chief Complaint:  Hyperparathyroidism      History of Present Illness: Marsha Chun is a 75 y.o. female with history of primary hyperparathyroidism, hypertension, obesity, in follow-up of hyperparathyroidism.  Patient was previously by Dr. Jaquez on 2//2019, previously seen by Dr. Flynn in 2013.  This is patient's 1st visit with me    Primary Hyperparathyroidism  She was first noted to have hypercalcemia in 2009 - this was the only episode noted   Her PTH was first checked at that time and was elevated   She was on hctz at that time which was stopped and since there has been normalization of the calcium but the pth has remained elevated     Sestamibi 2011   Impression:                                                                                                                                No findings to suggest parathyroid adenoma.                                                                                  U/s 2011   IMPRESSION:  No significant abnormalities are identified.                                                                                  Patient fell 2 months ago leading to a left knee injury, doing physical therapy at this time  Uses cane to assist with ambulation    No hystect    Vit D supplement:  5000 IU daily  Not on any calcium supplements, Tums    No   Yes  [x]    []  HCTZ   [x]    []  Lithium  []    [x]  Chlorthalidone      Dietary intake of calcium:   Occasionally will eat cheese    Symptoms  No   Yes  [x]    []  Neuro symptoms  [x]    []  Depression  [x]    []  Mental Fog  [x]    []  Anxiety    No   Yes  [x]    []  Fractures  []    [x]  Osteopenia/osteoporosis  [x]    []  >2" height loss  [x]    []  Kidney stones  [x]    []  GFR <60  [x]    []  Constipation  [x]    []  Bone pain  [x]    []  Muscle spasm      Patient has osteopenia, bone density done " on 02/21/2019  Lumbar Spine: Lumbar bone mineral density L1-L4 is 0.866g/cm2, which is a T-score of -1.6. The Z-score is -0.1.  Total Hip: Total hip bone mineral density is 0.888g/cm2.  The T-score is -0.4, and the Z-score is 0.3.  Femoral neck: Bone mineral density is 0.798g/cm2 and the T-score is -0.5 and the Z-score is 0.4 g/cm2.  Forearm: The left forearm (radius 33%) bone mineral density is equal to 0.604 g/cm squared with at T-Score of -1.5.  There is 0.8     There is a 4.5% risk of a major osteoporotic fracture and a 0.4% risk of hip fracture in the next 10 years (FRAX).     IMPRESSION:   1.  Osteopenia at the distal 1/3 radius and lumbar spine.  2.  FRAX calculations do not support treatment.    Postmenopausal symptoms age 52  Never had hysterectomy  No family history of osteoporosis that she knows    No personal history of diabetes    Lab Results   Component Value Date    .0 (H) 01/20/2020    .9 (H) 01/21/2019    PTH 81.3 (H) 01/04/2018    CXUJWPIK69ZH 31 01/21/2019    HWWCIBHZ06MQ 27 (L) 01/04/2018    SJDECJID98EF 32 07/11/2016    CALCIUM 10.2 06/04/2020    CALCIUM 9.0 01/20/2020    CALCIUM 9.8 01/21/2019    PHOS 4.0 07/11/2016    PHOS 3.9 06/24/2015    PHOS 3.8 12/18/2014    ALKPHOS 159 (H) 11/20/2020    ALKPHOS 167 (H) 06/04/2020    ALKPHOS 151 (H) 01/20/2020    TSH 3.943 01/20/2020       Reviewed past surgical, medical, family, social history and updated as appropriate.    Review of Systems   Constitutional: Negative for activity change and unexpected weight change.   HENT: Negative for sore throat and voice change.    Eyes: Negative for visual disturbance.   Respiratory: Negative for shortness of breath.    Cardiovascular: Negative for chest pain.   Gastrointestinal: Negative for abdominal pain, constipation, diarrhea, nausea and vomiting.   Genitourinary: Negative for urgency.   Musculoskeletal: Positive for arthralgias and gait problem (Left knee pain, leading to poor ambulation).  "  Skin: Negative for wound.   Neurological: Negative for headaches.   Psychiatric/Behavioral: Negative for confusion and sleep disturbance.       Objective:   /77 (BP Location: Left arm, Patient Position: Sitting, BP Method: Large (Automatic))   Pulse 68   Temp 97.8 °F (36.6 °C)   Resp 18   Ht 5' 5" (1.651 m)   Wt 88 kg (194 lb)   SpO2 95%   BMI 32.28 kg/m²     Body mass index is 32.28 kg/m².    Physical Exam  Vitals signs and nursing note reviewed.   Constitutional:       Appearance: She is well-developed.   HENT:      Head: Normocephalic.   Eyes:      General: No scleral icterus.     Conjunctiva/sclera: Conjunctivae normal.   Neck:      Musculoskeletal: Neck supple.      Thyroid: No thyromegaly.      Comments: No thyromegaly on exam  Cardiovascular:      Rate and Rhythm: Normal rate.      Heart sounds: Normal heart sounds.   Pulmonary:      Effort: Pulmonary effort is normal. No respiratory distress.   Abdominal:      Palpations: Abdomen is soft.      Tenderness: There is no abdominal tenderness.   Musculoskeletal: Normal range of motion.   Skin:     General: Skin is warm.      Findings: No erythema.   Neurological:      Mental Status: She is alert.      Coordination: Coordination normal.   Psychiatric:         Behavior: Behavior normal.         Lab Review:   Lab Results   Component Value Date    HGBA1C 5.4 01/20/2020       Lab Results   Component Value Date    CHOL 244 (H) 06/04/2020    HDL 82 (H) 06/04/2020    LDLCALC 139.4 06/04/2020    TRIG 113 06/04/2020    CHOLHDL 33.6 06/04/2020       Lab Results   Component Value Date     06/04/2020    K 4.6 06/04/2020     06/04/2020    CO2 27 06/04/2020    GLU 88 06/04/2020    BUN 9 06/04/2020    CREATININE 0.8 06/04/2020    CALCIUM 10.2 06/04/2020    PROT 7.5 11/20/2020    ALBUMIN 3.8 11/20/2020    BILITOT 0.4 11/20/2020    ALKPHOS 159 (H) 11/20/2020    AST 18 11/20/2020    ALT 12 11/20/2020    ANIONGAP 7 (L) 06/04/2020    ESTGFRAFRICA >60.0 " 06/04/2020    EGFRNONAA >60.0 06/04/2020    TSH 3.943 01/20/2020     Assessment and Plan     Marsha Chun is a 75 y.o. female here for management of the follow endocrinology disorder:  Primary hyperparathyroidism    Hyperparathyroidism, primary  Patient with primary hyperparathyroidism, with remote history of hypercalcemia, per chart review resolve with stopping of HCTZ   Calcium lab work recently have all been normal  Medications were reviewed:  Patient is on chlorthalidone, which can affect urine calcium  Dietary reviewed: not contributing  DXA with osteopenia in 2019  Mechanism of hyperparathyroidism leading to hypercalcemia was explained to pt, all questions were answered    Work up  - Checking TSH, Vit D, Renal function panel, PTH  - Checking urine Ca and Cr to monitor for hypercalciuria/ or FHH  - Check DXA , to be done on February, 2021    Plan  - Pending work up above  - Discussed indications for surgery if primary hyperparathyroidism worsens bone density  - Consider surgical therapy: Will need to get repeat NM Parathyroid scan, US thyroid, CT 4D Scan  - Advised to avoid dehydration, excessive calcium supplementations and avoid medication like HCTZ/Lithium that can worsen hypercalcemia.  - Follow up after results reviewed    Osteopenia  DXA in 2019 reviewed, no indication for treatment at that time  Patient is due for DXA February 2021  Continue vitamin-D supplementation  Advises weight-bearing exercise, walking    Obesity (BMI 30.0-34.9)  Screening for diabetes with A1c, TSH    Essential (primary) hypertension  Temporary stopping Chlorthalidone  Start amlodipine 5 mg daily for blood pressure management at this time  Can restart medication after urine studies are complete 3 month      RTC in 3 months after lab work to discuss further treatment plan      Boris Robbins MD  Endocrinology- [facility]  12/2/2020      Disclaimer: This note has been generated using voice-recognition software.  There may be typographical errors that have been missed during proof-reading.

## 2020-12-02 NOTE — PATIENT INSTRUCTIONS
EXAM DATE/TIME:  03/22/2018 00:16 

 

[facility] COMPARISON:     

CT BRAIN W/O CONTRAST, March 21, 2018, 22:06.

       

 

 

INDICATIONS :     

CVA. Slurred speech and left facial droop since this morning.

                     

 

MEDICAL HISTORY :     

None.     

 

SURGICAL HISTORY :     

Cholecystectomy.     Hernia repair.

 

ENCOUNTER:     

Initial

 

ACUITY:     

1 day

 

PAIN SCORE:     

4/10

 

LOCATION:         

Head.

 

TECHNIQUE:     

Multiplanar, multisequence MRI of the brain was performed without contrast.

 

FINDINGS:     

 

CEREBRUM:     

The ventricles are normal for age.  No evidence of midline shift, mass lesion, hemorrhage or acute in
farction.  No extraaxial fluid collections are seen.  The pituitary gland and suprasellar cistern are
 normal in configuration.

 

WHITE MATTER:     

No significant signal abnormalities are seen in the white matter.

 

POSTERIOR FOSSA:     

The cerebellum and brainstem are intact.  The 4th ventricle is midline. The cerebellopontine angle is
 unremarkable.  The cerebellar tonsils are normal in position.

 

DIFFUSION IMAGING:     

No focal areas of restricted diffusion are seen.  No evidence of acute infarction.

 

EXTRACRANIAL:     

The visualized portions of the orbits and paranasal sinuses are unremarkable.

 

CONCLUSION:     

1. Negative MRI of the brain.  No evidence of acute stroke.

 

 

 

 Joseph Magallanes MD on March 22, 2018 at 0:31           

Board Certified Radiologist.

 This report was verified electronically. Stop blood medication for 3 month, start you on alternative called Amlodipine 5mg daily    Continue vit D 5000IU daily    Continue exercise    Do lab work in 3 months, fasting, 8 AM  Collect urine for 24 hours.  Pick a day that you are free/at home, start in the morning, follow the written instruction and collect urine for the next 24 hours.  Return the urine to any [facility] lab    Bone Density in after 2/21/2021    Follow up with me in 3 months to discuss the results    Boris Robbins M.D  [facility]  Endocrinology  [address] [facility] [address] [address]  [place] [address]    Office:  [phone]  Fax:  [phone]

## 2020-12-02 NOTE — LETTER
December 2, 2020      Marcos Sevilla MD  [address]  [facility] [address]           [place] - Endo/Diabetes  [address]  [place] [address]  Phone: [phone]  Fax: [phone]          Patient: Marsha Chun   MR Number: [id number]   YOB: 1945   Date of Visit: 12/2/2020       Dear Dr. Marcos Sevilla:    Thank you for referring Marsha Chun to me for evaluation. Attached you will find relevant portions of my assessment and plan of care.    If you have questions, please do not hesitate to call me. I look forward to following Marsha Chun along with you.    Sincerely,    Boris Robbins MD    Enclosure  CC:  No Recipients    If you would like to receive this communication electronically, please contact [email or web address] or [phone] to request more information on [facility] access.    For providers and/or their staff who would like to refer a patient to [facility], please contact us through our one-stop-shop provider referral line, [facility], at [phone].    If you feel you have received this communication in error or would no longer like to receive these types of communications, please e-mail [email or web address]

## 2020-12-02 NOTE — ASSESSMENT & PLAN NOTE
Temporary stopping Chlorthalidone  Start amlodipine 5 mg daily for blood pressure management at this time  Can restart medication after urine studies are complete 3 month

## 2020-12-02 NOTE — ASSESSMENT & PLAN NOTE
DXA in 2019 reviewed, no indication for treatment at that time  Patient is due for DXA February 2021  Continue vitamin-D supplementation  Advises weight-bearing exercise, walking

## 2020-12-07 ENCOUNTER — PES CALL (OUTPATIENT)
Dept: ADMINISTRATIVE | Facility: CLINIC | Age: 75
End: 2020-12-07

## 2020-12-08 ENCOUNTER — TELEPHONE (OUTPATIENT)
Dept: FAMILY MEDICINE | Facility: CLINIC | Age: 75
End: 2020-12-08

## 2020-12-08 DIAGNOSIS — Z12.31 BREAST CANCER SCREENING BY MAMMOGRAM: Primary | ICD-10-CM

## 2020-12-08 NOTE — TELEPHONE ENCOUNTER
----- Message from Ceferino Perez sent at 12/8/2020  9:59 AM CST -----   Name of Who is Calling:     What is the request in detail: patient request call back in reference to mammogram order  Please contact to further discuss and advise      Can the clinic reply by [facility]: no     What Number to Call Back if not in [facility]:  [phone]

## 2020-12-10 ENCOUNTER — HOSPITAL ENCOUNTER (OUTPATIENT)
Dept: RADIOLOGY | Facility: HOSPITAL | Age: 75
Discharge: HOME | End: 2020-12-10
Attending: INTERNAL MEDICINE
Payer: MEDICARE

## 2020-12-10 ENCOUNTER — CLINICAL SUPPORT (OUTPATIENT)
Dept: REHABILITATION | Facility: HOSPITAL | Age: 75
End: 2020-12-10
Payer: MEDICARE

## 2020-12-10 VITALS — BODY MASS INDEX: 32.32 KG/M2 | HEIGHT: 65 IN | WEIGHT: 194 LBS

## 2020-12-10 DIAGNOSIS — M25.662 DECREASED RANGE OF MOTION (ROM) OF LEFT KNEE: ICD-10-CM

## 2020-12-10 DIAGNOSIS — R26.2 DIFFICULTY WALKING: ICD-10-CM

## 2020-12-10 DIAGNOSIS — Z12.31 BREAST CANCER SCREENING BY MAMMOGRAM: ICD-10-CM

## 2020-12-10 PROCEDURE — 77063 BREAST TOMOSYNTHESIS BI: CPT | Mod: 26,HCNC,, | Performed by: INTERNAL MEDICINE

## 2020-12-10 PROCEDURE — 97110 THERAPEUTIC EXERCISES: CPT | Mod: HCNC,PN

## 2020-12-10 PROCEDURE — 77063 MAMMO DIGITAL SCREENING BILAT WITH TOMO: ICD-10-PCS | Mod: 26,HCNC,, | Performed by: INTERNAL MEDICINE

## 2020-12-10 PROCEDURE — 77067 SCR MAMMO BI INCL CAD: CPT | Mod: TC,HCNC

## 2020-12-10 PROCEDURE — 77067 MAMMO DIGITAL SCREENING BILAT WITH TOMO: ICD-10-PCS | Mod: 26,HCNC,, | Performed by: INTERNAL MEDICINE

## 2020-12-10 PROCEDURE — 77067 SCR MAMMO BI INCL CAD: CPT | Mod: 26,HCNC,, | Performed by: INTERNAL MEDICINE

## 2020-12-10 NOTE — PROGRESS NOTES
Physical Therapy Daily Treatment Note     Name: Marsha Lopez [place]  Clinic Number: [id number]    Therapy Diagnosis:   Encounter Diagnoses   Name Primary?    Decreased range of motion (ROM) of left knee     Difficulty walking      Physician: Francisca Lott MD    Visit Date: 12/10/2020    Physician Orders: PT Eval and Treat   Medical Diagnosis from Referral:    M25.561 (ICD-10-CM) - Acute pain of right knee     Evaluation Date: 11/25/2020  Plan of Care Expiration: 2/25/20     Authorization Period Expiration: 12/25/20  Visit # / Visits authorized: 1/ 1      Time In: 1250  Time Out: 1355    Total Billable Time: 55 minutes    Precautions: Standard, Fall risk    Subjective     Pt reports: the knee is improving with therapy.    She was compliant with home exercise program.  Response to previous treatment:  good  Functional change: none to note    Pain: 3/10  Location: left knee     Objective     Marsha received therapeutic exercises to develop strength, endurance, ROM, flexibility, posture and core stabilization for 55 minutes including:     -Nu step x 7 min  -gastroc stretch on wedge 30 sec x 3  -gastroc stretch vs table 30 sec x 3  -heel raises 3 x 10  -quad set 2 x 10  -2 way SLR 2 x 8  -heel raises 2 x 10  -heel slide x 10  -towel stretch 30 sec x 3  -TKE with pink tubing x 20  -6 in step up 2 x 4        Marsha received the following manual therapy techniques:  were applied to the: left knee for 5 minutes, including:  -tibiofemoral distraction    CP left knee post treatment x 10 min     Education provided:   - HEP compliance      Written Home Exercises Provided: Patient instructed to cont prior HEP.  Exercises were reviewed and Marsha was able to demonstrate them prior to the end of the session.  Marsha demonstrated good  understanding of the education provided.     See EMR under Patient Instructions for exercises provided 12/10/2020.    Assessment     Pt continues to ambulate with an antalgic gait, decreased  stance on left LE with a SPC.  Improved VMO response.  No c/o increased discomfort with prescribed activities. Good response to progression of CKC activities. Marsha is progressing well towards her goals.     Pt prognosis is Good.     Pt will continue to benefit from skilled outpatient physical therapy to address the deficits listed in the problem list box on initial evaluation, provide pt/family education and to maximize pt's level of independence in the home and community environment.     Pt's spiritual, cultural and educational needs considered and pt agreeable to plan of care and goals.     Anticipated barriers to physical therapy: none    Short Term Goals (4 Weeks):     1.Pt to increase strength by a 1/2 grade of muscles test to allow for improvement in functional activities such as performing chores.  2.Pt to improve range of motion by 25% to allow for improved functional mobility to allow for improvement in IADLs.   3.Pt to report compliance with HEP and demonstrate proper exercise technique to PT to show competence with self management of condition.  4.Decrease pain by 25% during functional activities.    Long Term Goals (12 Weeks):     1. Increase ROM to allow improved joint biomechanics during functional activities.   2.Increase trunk and lower extremity strength to within normal limits during functional activities.   3. Independent with home exercise program.   4. Full return to functional activities with manageable complaints.  5. Patient to demonstrate improved posture and body mechanics.  6. Decrease pain by 75% during functional activities.         Plan       Recommended Treatment Plan: 2-3 times per week for 12 weeks with treatments to consist of:  Neuromuscular and postural re-education,  training, therapeutic exercise, therapeutic activities,balance training, gait training, manual therapy, soft tissue mobilization, ROM exercises, Cardiovascular,  Postural stabilization, manual  traction, spinal mobilization, moist heat, cryotherapy, electrical stimulation, ultrasound, home exercise education and planning.    Continue with established Plan of Care towards PT goals.     Delmar Owusu, PT

## 2020-12-17 ENCOUNTER — CLINICAL SUPPORT (OUTPATIENT)
Dept: REHABILITATION | Facility: HOSPITAL | Age: 75
End: 2020-12-17
Payer: MEDICARE

## 2020-12-17 DIAGNOSIS — M25.662 DECREASED RANGE OF MOTION (ROM) OF LEFT KNEE: ICD-10-CM

## 2020-12-17 DIAGNOSIS — R26.2 DIFFICULTY WALKING: ICD-10-CM

## 2020-12-17 PROCEDURE — 97110 THERAPEUTIC EXERCISES: CPT | Mod: HCNC,PN

## 2020-12-17 NOTE — PROGRESS NOTES
Physical Therapy Daily Treatment Note     Name: Marsha Lpoez [place]  Clinic Number: [id number]    Therapy Diagnosis:   Encounter Diagnoses   Name Primary?    Decreased range of motion (ROM) of left knee     Difficulty walking      Physician: Francisca Lott MD    Visit Date: 12/17/2020    Physician Orders: PT Eval and Treat   Medical Diagnosis from Referral:    M25.561 (ICD-10-CM) - Acute pain of right knee     Evaluation Date: 11/25/2020  Plan of Care Expiration: 2/25/20     Authorization Period Expiration: 12/25/20  Visit # / Visits authorized: 1/ 1      Time In: 1300  Time Out: 1355    Total Billable Time: 55 minutes    Precautions: Standard, Fall risk    Subjective     Pt reports: the knee is improving with therapy.  C/o continued discomfort at lateral knee.   She was compliant with home exercise program.  Response to previous treatment:  good  Functional change: none to note    Pain: 3/10  Location: left knee     Objective     Marsha received therapeutic exercises to develop strength, endurance, ROM, flexibility, posture and core stabilization for 55 minutes including:     -Nu step x 7 min  -gastroc stretch on wedge 30 sec x 3  -gastroc stretch vs table 30 sec x 3  -heel raises 3 x 10  -quad set 2 x 10  -2 way SLR 2 x 8  -heel raises 2 x 10  -heel slide x 10  -towel stretch 30 sec x 3  -TKE with pink tubing x 20  -6 in step up 2 x 4        Marsha received the following manual therapy techniques:  were applied to the: left knee for 5 minutes, including:  -tibiofemoral distraction    CP left knee post treatment x 10 min     Education provided:   - HEP compliance      Written Home Exercises Provided: Patient instructed to cont prior HEP.  Exercises were reviewed and Marsha was able to demonstrate them prior to the end of the session.  Marsha demonstrated good  understanding of the education provided.     See EMR under Patient Instructions for exercises provided 12/17/2020.    Assessment     Pt continues to  ambulate with an antalgic gait, decreased stance on left LE with a SPC. Able to perform 6 in step ups with single UE assist.  No c/o increased discomfort with prescribed activities. Improved response to progression of CKC activities. Marsha is progressing well towards her goals.     Pt prognosis is Good.     Pt will continue to benefit from skilled outpatient physical therapy to address the deficits listed in the problem list box on initial evaluation, provide pt/family education and to maximize pt's level of independence in the home and community environment.     Pt's spiritual, cultural and educational needs considered and pt agreeable to plan of care and goals.     Anticipated barriers to physical therapy: none    Short Term Goals (4 Weeks):     1.Pt to increase strength by a 1/2 grade of muscles test to allow for improvement in functional activities such as performing chores.  2.Pt to improve range of motion by 25% to allow for improved functional mobility to allow for improvement in IADLs.   3.Pt to report compliance with HEP and demonstrate proper exercise technique to PT to show competence with self management of condition.  4.Decrease pain by 25% during functional activities.    Long Term Goals (12 Weeks):     1. Increase ROM to allow improved joint biomechanics during functional activities.   2.Increase trunk and lower extremity strength to within normal limits during functional activities.   3. Independent with home exercise program.   4. Full return to functional activities with manageable complaints.  5. Patient to demonstrate improved posture and body mechanics.  6. Decrease pain by 75% during functional activities.         Plan       Recommended Treatment Plan: 2-3 times per week for 12 weeks with treatments to consist of:  Neuromuscular and postural re-education,  training, therapeutic exercise, therapeutic activities,balance training, gait training, manual therapy, soft tissue  mobilization, ROM exercises, Cardiovascular,  Postural stabilization, manual traction, spinal mobilization, moist heat, cryotherapy, electrical stimulation, ultrasound, home exercise education and planning.    Continue with established Plan of Care towards PT goals.     Delmar Owusu, PT

## 2020-12-23 NOTE — PROGRESS NOTES
"  Physical Therapy Daily Treatment Note     Name: Marsha Lopez [place]  Clinic Number: [id number]    Therapy Diagnosis:   Encounter Diagnoses   Name Primary?    Decreased range of motion (ROM) of left knee     Difficulty walking      Physician: Francisca Lott MD    Visit Date: 12/24/2020    Physician Orders: PT Eval and Treat   Medical Diagnosis from Referral:    M25.561 (ICD-10-CM) - Acute pain of right knee     Evaluation Date: 11/25/2020  Plan of Care Expiration: 2/25/20     Authorization Period Expiration: 12/25/20  Visit # / Visits authorized: 5/20      Time In: 9:13 AM  Time Out: 10:08 AM    Total Billable Time: 55  minutes    Precautions: Standard, Fall risk    Subjective     Pt reports: pt reports pain is not too bad today. She reports she would like to progress her strengthening to see if it will help the discomfort on the lateral aspect of her knee.   She was compliant with home exercise program.  Response to previous treatment:  good  Functional change: none to note    Pain: 2/10  Location: left knee     Objective     Marsha received therapeutic exercises to develop strength, endurance, ROM, flexibility, posture and core stabilization for 47 minutes including:     -Nu step x 5 min  -gastroc stretch on wedge 30 sec x 3  +supine hamstring stretch with strep 30" x 2  +Supine IT band stretch with strap 30" x 2 ( PTA assisted in deeper stretch- pt had good relief of symptoms in lateral aspect of knee)   + Sit to stands from 18 in plyo (UE support needed) x 15     -heel raises 3 x 10  -quad set 2 x 10  -SLR 2 X 10   -heel slide x 10  -towel stretch 30 sec x 3  -TKE with purple tubing x 20  -6 in step up x 10   + 6 in lateral step 2 x 10   +Standing hip flexion red TB 2 x 10 and extension 2 x 10 red TB  +Matrix hip abduction 30#s 2 x 10   +LAQ 3# 2 x 10        Marsha received the following manual therapy techniques:  were applied to the: left knee for 08 minutes, including:  -tibiofemoral distraction    CP " left knee post treatment x 10 min     Education provided:   - HEP compliance  - possibility of DOMs  - Updated HEP included: IT band stretch, SLR, sit to stands, and heel slides     Written Home Exercises Provided: Patient instructed to cont prior HEP.  Exercises were reviewed and Marsha was able to demonstrate them prior to the end of the session.  Marsha demonstrated good  understanding of the education provided.     See EMR under Patient Instructions for exercises provided 12/24/2020.    Assessment     Marsha tolerated treatment very well today. She was progressed on LLE CKC strengthening and tolerated everything well. She experienced some difficulties with sit to stands and required UE support to assist with standing. She was given an IT band stretch in supine with strap and had good relief of symptoms on lateral aspect of knee. Updated patients HEP today. She states she has one more appointment before the new year but will not be continuing therapy after new year.        Marsha is progressing well towards her goals.     Pt prognosis is Good.     Pt will continue to benefit from skilled outpatient physical therapy to address the deficits listed in the problem list box on initial evaluation, provide pt/family education and to maximize pt's level of independence in the home and community environment.     Pt's spiritual, cultural and educational needs considered and pt agreeable to plan of care and goals.     Anticipated barriers to physical therapy: none    Short Term Goals (4 Weeks):     1.Pt to increase strength by a 1/2 grade of muscles test to allow for improvement in functional activities such as performing chores.  2.Pt to improve range of motion by 25% to allow for improved functional mobility to allow for improvement in IADLs.   3.Pt to report compliance with HEP and demonstrate proper exercise technique to PT to show competence with self management of condition.  4.Decrease pain by 25% during functional  activities.    Long Term Goals (12 Weeks):     1. Increase ROM to allow improved joint biomechanics during functional activities.   2.Increase trunk and lower extremity strength to within normal limits during functional activities.   3. Independent with home exercise program.   4. Full return to functional activities with manageable complaints.  5. Patient to demonstrate improved posture and body mechanics.  6. Decrease pain by 75% during functional activities.         Plan       Recommended Treatment Plan: 2-3 times per week for 12 weeks with treatments to consist of:  Neuromuscular and postural re-education,  training, therapeutic exercise, therapeutic activities,balance training, gait training, manual therapy, soft tissue mobilization, ROM exercises, Cardiovascular,  Postural stabilization, manual traction, spinal mobilization, moist heat, cryotherapy, electrical stimulation, ultrasound, home exercise education and planning.    Continue with established Plan of Care towards PT goals.     Arely Montemayor, PTA   12/24/2020

## 2020-12-24 ENCOUNTER — CLINICAL SUPPORT (OUTPATIENT)
Dept: REHABILITATION | Facility: HOSPITAL | Age: 75
End: 2020-12-24
Payer: MEDICARE

## 2020-12-24 DIAGNOSIS — R26.2 DIFFICULTY WALKING: ICD-10-CM

## 2020-12-24 DIAGNOSIS — M25.662 DECREASED RANGE OF MOTION (ROM) OF LEFT KNEE: ICD-10-CM

## 2020-12-24 PROCEDURE — 97140 MANUAL THERAPY 1/> REGIONS: CPT | Mod: HCNC,PN,CQ

## 2020-12-24 PROCEDURE — 97110 THERAPEUTIC EXERCISES: CPT | Mod: HCNC,PN,CQ

## 2020-12-31 ENCOUNTER — CLINICAL SUPPORT (OUTPATIENT)
Dept: REHABILITATION | Facility: HOSPITAL | Age: 75
End: 2020-12-31
Payer: MEDICARE

## 2020-12-31 DIAGNOSIS — M25.662 DECREASED RANGE OF MOTION (ROM) OF LEFT KNEE: ICD-10-CM

## 2020-12-31 DIAGNOSIS — R26.2 DIFFICULTY WALKING: ICD-10-CM

## 2020-12-31 PROCEDURE — 97110 THERAPEUTIC EXERCISES: CPT | Mod: HCNC,PN

## 2020-12-31 NOTE — PROGRESS NOTES
"  Physical Therapy Daily Treatment Note     Name: Marsha Lopez [place]  Clinic Number: [id number]    Therapy Diagnosis:   Encounter Diagnoses   Name Primary?    Decreased range of motion (ROM) of left knee     Difficulty walking      Physician: Francisca Lott MD    Visit Date: 12/31/2020    Physician Orders: PT Eval and Treat   Medical Diagnosis from Referral:    M25.561 (ICD-10-CM) - Acute pain of right knee     Evaluation Date: 11/25/2020  Plan of Care Expiration: 2/25/20     Authorization Period Expiration: 12/25/20  Visit # / Visits authorized: 5/20       Time In: 1430  Time Out: 1515    Total Billable Time: 45  minutes    Precautions: Standard, Fall risk    Subjective     Pt reports: the knee continues to improve with therapy.   She was compliant with home exercise program.  Response to previous treatment:  good  Functional change: none to note    Pain: 2/10  Location: left knee     Objective     Palpation: mild lateral joint line tenderness, distal lateral hamstrings       Range of Motion/Strength:   .  Knee  Right    Left        AROM  MMT  AROM  MMT    Flexion  130 4 128 4-   Extension  0 4 -2 4-        Marsha received therapeutic exercises to develop strength, endurance, ROM, flexibility, posture and core stabilization for 40 minutes including:     -Nu step x 5 min  -gastroc stretch on wedge 30 sec x 3  +supine hamstring stretch with strep 30" x 2  +Supine IT band stretch with strap 30" x 2 ( PTA assisted in deeper stretch- pt had good relief of symptoms in lateral aspect of knee)   + Sit to stands from 18 in plyo (UE support needed) x 15     -heel raises 3 x 10  -quad set 2 x 10  -SLR 2 X 10   -heel slide x 10  -towel stretch 30 sec x 3  -TKE with purple tubing x 20  -6 in step up x 10   + 6 in lateral step 2 x 10   +Standing hip flexion red TB 2 x 10 and extension 2 x 10 red TB: np  +Matrix hip abduction 30#s 2 x 10   +LAQ 3# 2 x 10        Marsha received the following manual therapy techniques:  were " applied to the: left knee for 05 minutes, including:  -tibiofemoral distraction    CP left knee post treatment x 10 min     Education provided:   - HEP compliance    Written Home Exercises Provided: Patient instructed to cont prior HEP.  Exercises were reviewed and Marsha was able to demonstrate them prior to the end of the session.  Marsha demonstrated good  understanding of the education provided.     See EMR under Patient Instructions for exercises provided 12/31/2020.    Assessment     Pt continues to ambulate with an antalgic gait, decreased stance on left LE with a SPC.   No c/o increased discomfort with prescribed activities. Moderate improvements in active knee flexion ROM.  Good response to progression of CKC activities. Marsha is progressing well towards her goals.         Marsha is progressing well towards her goals.     Pt prognosis is Good.     Pt will continue to benefit from skilled outpatient physical therapy to address the deficits listed in the problem list box on initial evaluation, provide pt/family education and to maximize pt's level of independence in the home and community environment.     Pt's spiritual, cultural and educational needs considered and pt agreeable to plan of care and goals.     Anticipated barriers to physical therapy: none    Short Term Goals (4 Weeks):  Updated 12/31/20  MET    1.Pt to increase strength by a 1/2 grade of muscles test to allow for improvement in functional activities such as performing chores.  2.Pt to improve range of motion by 25% to allow for improved functional mobility to allow for improvement in IADLs.   3.Pt to report compliance with HEP and demonstrate proper exercise technique to PT to show competence with self management of condition.  4.Decrease pain by 25% during functional activities.    Long Term Goals (12 Weeks):     1. Increase ROM to allow improved joint biomechanics during functional activities.   2.Increase trunk and lower extremity  strength to within normal limits during functional activities.   3. Independent with home exercise program.   4. Full return to functional activities with manageable complaints.  5. Patient to demonstrate improved posture and body mechanics.  6. Decrease pain by 75% during functional activities.         Plan       Recommended Treatment Plan: 2-3 times per week for 12 weeks with treatments to consist of:  Neuromuscular and postural re-education,  training, therapeutic exercise, therapeutic activities,balance training, gait training, manual therapy, soft tissue mobilization, ROM exercises, Cardiovascular,  Postural stabilization, manual traction, spinal mobilization, moist heat, cryotherapy, electrical stimulation, ultrasound, home exercise education and planning.    Continue with established Plan of Care towards PT goals.     Delmar Owusu, PT   12/24/2020

## 2021-01-05 ENCOUNTER — CLINICAL SUPPORT (OUTPATIENT)
Dept: REHABILITATION | Facility: HOSPITAL | Age: 76
End: 2021-01-05
Payer: MEDICARE

## 2021-01-05 DIAGNOSIS — M25.662 DECREASED RANGE OF MOTION (ROM) OF LEFT KNEE: ICD-10-CM

## 2021-01-05 DIAGNOSIS — R26.2 DIFFICULTY WALKING: ICD-10-CM

## 2021-01-05 PROCEDURE — 97110 THERAPEUTIC EXERCISES: CPT | Mod: HCNC,PN

## 2021-01-10 ENCOUNTER — IMMUNIZATION (OUTPATIENT)
Dept: OBSTETRICS AND GYNECOLOGY | Facility: CLINIC | Age: 76
End: 2021-01-10
Payer: MEDICARE

## 2021-01-10 DIAGNOSIS — Z23 NEED FOR VACCINATION: ICD-10-CM

## 2021-01-10 PROCEDURE — 91300 COVID-19, MRNA, LNP-S, PF, 30 MCG/0.3 ML DOSE VACCINE: CPT | Mod: PBBFAC | Performed by: FAMILY MEDICINE

## 2021-01-12 ENCOUNTER — CLINICAL SUPPORT (OUTPATIENT)
Dept: REHABILITATION | Facility: HOSPITAL | Age: 76
End: 2021-01-12
Payer: MEDICARE

## 2021-01-12 DIAGNOSIS — M25.662 DECREASED RANGE OF MOTION (ROM) OF LEFT KNEE: ICD-10-CM

## 2021-01-12 DIAGNOSIS — R26.2 DIFFICULTY WALKING: ICD-10-CM

## 2021-01-12 PROCEDURE — 97110 THERAPEUTIC EXERCISES: CPT | Mod: HCNC,PN

## 2021-01-19 DIAGNOSIS — M17.12 OSTEOARTHRITIS OF LEFT KNEE, UNSPECIFIED OSTEOARTHRITIS TYPE: ICD-10-CM

## 2021-01-19 RX ORDER — DICLOFENAC SODIUM 10 MG/G
2 GEL TOPICAL 3 TIMES DAILY
Qty: 150 G | Refills: 5 | Status: SHIPPED | OUTPATIENT
Start: 2021-01-19 | End: 2021-03-03

## 2021-01-25 ENCOUNTER — CLINICAL SUPPORT (OUTPATIENT)
Dept: REHABILITATION | Facility: HOSPITAL | Age: 76
End: 2021-01-25
Payer: MEDICARE

## 2021-01-25 DIAGNOSIS — R26.2 DIFFICULTY WALKING: ICD-10-CM

## 2021-01-25 DIAGNOSIS — M25.662 DECREASED RANGE OF MOTION (ROM) OF LEFT KNEE: ICD-10-CM

## 2021-01-25 PROCEDURE — 97110 THERAPEUTIC EXERCISES: CPT | Mod: HCNC,PN

## 2021-01-31 ENCOUNTER — IMMUNIZATION (OUTPATIENT)
Dept: OBSTETRICS AND GYNECOLOGY | Facility: CLINIC | Age: 76
End: 2021-01-31
Payer: MEDICARE

## 2021-01-31 DIAGNOSIS — Z23 NEED FOR VACCINATION: Primary | ICD-10-CM

## 2021-01-31 PROCEDURE — 91300 COVID-19, MRNA, LNP-S, PF, 30 MCG/0.3 ML DOSE VACCINE: CPT | Mod: PBBFAC | Performed by: FAMILY MEDICINE

## 2021-01-31 PROCEDURE — 0002A COVID-19, MRNA, LNP-S, PF, 30 MCG/0.3 ML DOSE VACCINE: CPT | Mod: PBBFAC | Performed by: FAMILY MEDICINE

## 2021-02-02 ENCOUNTER — CLINICAL SUPPORT (OUTPATIENT)
Dept: REHABILITATION | Facility: HOSPITAL | Age: 76
End: 2021-02-02
Payer: MEDICARE

## 2021-02-02 DIAGNOSIS — M25.662 DECREASED RANGE OF MOTION (ROM) OF LEFT KNEE: ICD-10-CM

## 2021-02-02 DIAGNOSIS — R26.2 DIFFICULTY WALKING: ICD-10-CM

## 2021-02-02 PROCEDURE — 97110 THERAPEUTIC EXERCISES: CPT | Mod: HCNC,PN

## 2021-02-09 ENCOUNTER — OFFICE VISIT (OUTPATIENT)
Dept: FAMILY MEDICINE | Facility: CLINIC | Age: 76
End: 2021-02-09
Payer: MEDICARE

## 2021-02-09 VITALS
OXYGEN SATURATION: 98 % | DIASTOLIC BLOOD PRESSURE: 96 MMHG | HEIGHT: 65 IN | BODY MASS INDEX: 33.16 KG/M2 | SYSTOLIC BLOOD PRESSURE: 144 MMHG | TEMPERATURE: 98 F | WEIGHT: 199.06 LBS | HEART RATE: 90 BPM | RESPIRATION RATE: 18 BRPM

## 2021-02-09 DIAGNOSIS — E78.2 MIXED HYPERLIPIDEMIA: ICD-10-CM

## 2021-02-09 DIAGNOSIS — M25.569 ARTHRALGIA OF KNEE, UNSPECIFIED LATERALITY: Primary | ICD-10-CM

## 2021-02-09 DIAGNOSIS — E21.0 HYPERPARATHYROIDISM, PRIMARY: ICD-10-CM

## 2021-02-09 DIAGNOSIS — F43.21 GRIEF: ICD-10-CM

## 2021-02-09 PROCEDURE — 3008F PR BODY MASS INDEX (BMI) DOCUMENTED: ICD-10-PCS | Mod: CPTII,S$GLB,, | Performed by: INTERNAL MEDICINE

## 2021-02-09 PROCEDURE — 3077F SYST BP >= 140 MM HG: CPT | Mod: CPTII,S$GLB,, | Performed by: INTERNAL MEDICINE

## 2021-02-09 PROCEDURE — 99499 UNLISTED E&M SERVICE: CPT | Mod: HCNC,S$GLB,, | Performed by: INTERNAL MEDICINE

## 2021-02-09 PROCEDURE — 3078F PR MOST RECENT DIASTOLIC BLOOD PRESSURE < 80 MM HG: ICD-10-PCS | Mod: CPTII,S$GLB,, | Performed by: INTERNAL MEDICINE

## 2021-02-09 PROCEDURE — 3078F DIAST BP <80 MM HG: CPT | Mod: CPTII,S$GLB,, | Performed by: INTERNAL MEDICINE

## 2021-02-09 PROCEDURE — 99999 PR PBB SHADOW E&M-EST. PATIENT-LVL V: ICD-10-PCS | Mod: PBBFAC,,, | Performed by: INTERNAL MEDICINE

## 2021-02-09 PROCEDURE — 3077F PR MOST RECENT SYSTOLIC BLOOD PRESSURE >= 140 MM HG: ICD-10-PCS | Mod: CPTII,S$GLB,, | Performed by: INTERNAL MEDICINE

## 2021-02-09 PROCEDURE — 99214 OFFICE O/P EST MOD 30 MIN: CPT | Mod: S$GLB,,, | Performed by: INTERNAL MEDICINE

## 2021-02-09 PROCEDURE — 3008F BODY MASS INDEX DOCD: CPT | Mod: CPTII,S$GLB,, | Performed by: INTERNAL MEDICINE

## 2021-02-09 PROCEDURE — 99214 PR OFFICE/OUTPT VISIT, EST, LEVL IV, 30-39 MIN: ICD-10-PCS | Mod: S$GLB,,, | Performed by: INTERNAL MEDICINE

## 2021-02-09 PROCEDURE — 1126F PR PAIN SEVERITY QUANTIFIED, NO PAIN PRESENT: ICD-10-PCS | Mod: S$GLB,,, | Performed by: INTERNAL MEDICINE

## 2021-02-09 PROCEDURE — 1159F MED LIST DOCD IN RCRD: CPT | Mod: S$GLB,,, | Performed by: INTERNAL MEDICINE

## 2021-02-09 PROCEDURE — 1126F AMNT PAIN NOTED NONE PRSNT: CPT | Mod: S$GLB,,, | Performed by: INTERNAL MEDICINE

## 2021-02-09 PROCEDURE — 99999 PR PBB SHADOW E&M-EST. PATIENT-LVL V: CPT | Mod: PBBFAC,,, | Performed by: INTERNAL MEDICINE

## 2021-02-09 PROCEDURE — 99499 RISK ADDL DX/OHS AUDIT: ICD-10-PCS | Mod: HCNC,S$GLB,, | Performed by: INTERNAL MEDICINE

## 2021-02-09 PROCEDURE — 1159F PR MEDICATION LIST DOCUMENTED IN MEDICAL RECORD: ICD-10-PCS | Mod: S$GLB,,, | Performed by: INTERNAL MEDICINE

## 2021-02-09 RX ORDER — IBUPROFEN 800 MG/1
800 TABLET ORAL EVERY 8 HOURS PRN
Qty: 30 TABLET | Refills: 0 | Status: SHIPPED | OUTPATIENT
Start: 2021-02-09 | End: 2021-10-26 | Stop reason: SDUPTHER

## 2021-02-19 ENCOUNTER — CLINICAL SUPPORT (OUTPATIENT)
Dept: FAMILY MEDICINE | Facility: CLINIC | Age: 76
End: 2021-02-19
Payer: MEDICARE

## 2021-02-19 VITALS — HEART RATE: 90 BPM | DIASTOLIC BLOOD PRESSURE: 80 MMHG | SYSTOLIC BLOOD PRESSURE: 132 MMHG

## 2021-02-19 DIAGNOSIS — Z01.30 BP CHECK: Primary | ICD-10-CM

## 2021-02-19 PROCEDURE — 99999 PR PBB SHADOW E&M-EST. PATIENT-LVL II: CPT | Mod: PBBFAC,,,

## 2021-02-19 PROCEDURE — 99499 NO LOS: ICD-10-PCS | Mod: S$GLB,,, | Performed by: INTERNAL MEDICINE

## 2021-02-19 PROCEDURE — 99499 UNLISTED E&M SERVICE: CPT | Mod: S$GLB,,, | Performed by: INTERNAL MEDICINE

## 2021-02-19 PROCEDURE — 99999 PR PBB SHADOW E&M-EST. PATIENT-LVL II: ICD-10-PCS | Mod: PBBFAC,,,

## 2021-02-22 ENCOUNTER — HOSPITAL ENCOUNTER (OUTPATIENT)
Dept: RADIOLOGY | Facility: CLINIC | Age: 76
Discharge: HOME | End: 2021-02-22
Attending: HOSPITALIST
Payer: MEDICARE

## 2021-02-22 DIAGNOSIS — E21.0 HYPERPARATHYROIDISM, PRIMARY: ICD-10-CM

## 2021-02-22 DIAGNOSIS — M85.80 OSTEOPENIA, UNSPECIFIED LOCATION: ICD-10-CM

## 2021-02-22 PROCEDURE — 77080 DXA BONE DENSITY AXIAL: CPT | Mod: TC

## 2021-02-22 PROCEDURE — 77080 DEXA BONE DENSITY SPINE HIP: ICD-10-PCS | Mod: 26,,, | Performed by: INTERNAL MEDICINE

## 2021-02-22 PROCEDURE — 77080 DXA BONE DENSITY AXIAL: CPT | Mod: 26,,, | Performed by: INTERNAL MEDICINE

## 2021-03-03 ENCOUNTER — OFFICE VISIT (OUTPATIENT)
Dept: ENDOCRINOLOGY | Facility: CLINIC | Age: 76
End: 2021-03-03
Payer: MEDICARE

## 2021-03-03 VITALS
WEIGHT: 198.63 LBS | BODY MASS INDEX: 33.05 KG/M2 | DIASTOLIC BLOOD PRESSURE: 87 MMHG | HEART RATE: 100 BPM | TEMPERATURE: 98 F | SYSTOLIC BLOOD PRESSURE: 131 MMHG

## 2021-03-03 DIAGNOSIS — E66.9 OBESITY (BMI 30.0-34.9): ICD-10-CM

## 2021-03-03 DIAGNOSIS — M85.80 OSTEOPENIA, UNSPECIFIED LOCATION: ICD-10-CM

## 2021-03-03 DIAGNOSIS — E21.0 HYPERPARATHYROIDISM, PRIMARY: Primary | ICD-10-CM

## 2021-03-03 DIAGNOSIS — I10 ESSENTIAL (PRIMARY) HYPERTENSION: ICD-10-CM

## 2021-03-03 PROCEDURE — 3075F PR MOST RECENT SYSTOLIC BLOOD PRESS GE 130-139MM HG: ICD-10-PCS | Mod: CPTII,S$GLB,, | Performed by: HOSPITALIST

## 2021-03-03 PROCEDURE — 3079F DIAST BP 80-89 MM HG: CPT | Mod: CPTII,S$GLB,, | Performed by: HOSPITALIST

## 2021-03-03 PROCEDURE — 99999 PR PBB SHADOW E&M-EST. PATIENT-LVL III: CPT | Mod: PBBFAC,,, | Performed by: HOSPITALIST

## 2021-03-03 PROCEDURE — 99999 PR PBB SHADOW E&M-EST. PATIENT-LVL III: ICD-10-PCS | Mod: PBBFAC,,, | Performed by: HOSPITALIST

## 2021-03-03 PROCEDURE — 1126F AMNT PAIN NOTED NONE PRSNT: CPT | Mod: S$GLB,,, | Performed by: HOSPITALIST

## 2021-03-03 PROCEDURE — 3008F BODY MASS INDEX DOCD: CPT | Mod: CPTII,S$GLB,, | Performed by: HOSPITALIST

## 2021-03-03 PROCEDURE — 1126F PR PAIN SEVERITY QUANTIFIED, NO PAIN PRESENT: ICD-10-PCS | Mod: S$GLB,,, | Performed by: HOSPITALIST

## 2021-03-03 PROCEDURE — 1159F PR MEDICATION LIST DOCUMENTED IN MEDICAL RECORD: ICD-10-PCS | Mod: S$GLB,,, | Performed by: HOSPITALIST

## 2021-03-03 PROCEDURE — 99214 PR OFFICE/OUTPT VISIT, EST, LEVL IV, 30-39 MIN: ICD-10-PCS | Mod: S$GLB,,, | Performed by: HOSPITALIST

## 2021-03-03 PROCEDURE — 3079F PR MOST RECENT DIASTOLIC BLOOD PRESSURE 80-89 MM HG: ICD-10-PCS | Mod: CPTII,S$GLB,, | Performed by: HOSPITALIST

## 2021-03-03 PROCEDURE — 99214 OFFICE O/P EST MOD 30 MIN: CPT | Mod: S$GLB,,, | Performed by: HOSPITALIST

## 2021-03-03 PROCEDURE — 3008F PR BODY MASS INDEX (BMI) DOCUMENTED: ICD-10-PCS | Mod: CPTII,S$GLB,, | Performed by: HOSPITALIST

## 2021-03-03 PROCEDURE — 1159F MED LIST DOCD IN RCRD: CPT | Mod: S$GLB,,, | Performed by: HOSPITALIST

## 2021-03-03 PROCEDURE — 3075F SYST BP GE 130 - 139MM HG: CPT | Mod: CPTII,S$GLB,, | Performed by: HOSPITALIST

## 2021-03-18 ENCOUNTER — TELEPHONE (OUTPATIENT)
Dept: ENDOCRINOLOGY | Facility: CLINIC | Age: 76
End: 2021-03-18

## 2021-05-25 ENCOUNTER — PATIENT OUTREACH (OUTPATIENT)
Dept: ADMINISTRATIVE | Facility: HOSPITAL | Age: 76
End: 2021-05-25

## 2021-09-29 ENCOUNTER — LAB VISIT (OUTPATIENT)
Dept: LAB | Facility: HOSPITAL | Age: 76
End: 2021-09-29
Attending: HOSPITALIST
Payer: MEDICARE

## 2021-09-29 DIAGNOSIS — E21.0 HYPERPARATHYROIDISM, PRIMARY: ICD-10-CM

## 2021-09-29 DIAGNOSIS — E66.9 OBESITY (BMI 30.0-34.9): ICD-10-CM

## 2021-09-29 LAB
25(OH)D3+25(OH)D2 SERPL-MCNC: 35 NG/ML (ref 30–96)
ALBUMIN SERPL BCP-MCNC: 4 G/DL (ref 3.5–5.2)
ALP SERPL-CCNC: 124 U/L (ref 55–135)
ALT SERPL W/O P-5'-P-CCNC: 9 U/L (ref 10–44)
ANION GAP SERPL CALC-SCNC: 12 MMOL/L (ref 8–16)
AST SERPL-CCNC: 17 U/L (ref 10–40)
BILIRUB SERPL-MCNC: 0.6 MG/DL (ref 0.1–1)
BUN SERPL-MCNC: 8 MG/DL (ref 8–23)
CALCIUM SERPL-MCNC: 10.3 MG/DL (ref 8.7–10.5)
CHLORIDE SERPL-SCNC: 104 MMOL/L (ref 95–110)
CO2 SERPL-SCNC: 19 MMOL/L (ref 23–29)
CREAT SERPL-MCNC: 0.9 MG/DL (ref 0.5–1.4)
EST. GFR  (AFRICAN AMERICAN): >60 ML/MIN/1.73 M^2
EST. GFR  (NON AFRICAN AMERICAN): >60 ML/MIN/1.73 M^2
GLUCOSE SERPL-MCNC: 87 MG/DL (ref 70–110)
POTASSIUM SERPL-SCNC: 4.2 MMOL/L (ref 3.5–5.1)
PROT SERPL-MCNC: 7.6 G/DL (ref 6–8.4)
PTH-INTACT SERPL-MCNC: 82 PG/ML (ref 9–77)
SODIUM SERPL-SCNC: 135 MMOL/L (ref 136–145)

## 2021-09-29 PROCEDURE — 83970 ASSAY OF PARATHORMONE: CPT | Mod: HCNC | Performed by: HOSPITALIST

## 2021-09-29 PROCEDURE — 36415 COLL VENOUS BLD VENIPUNCTURE: CPT | Mod: HCNC,PO | Performed by: HOSPITALIST

## 2021-09-29 PROCEDURE — 82306 VITAMIN D 25 HYDROXY: CPT | Mod: HCNC | Performed by: HOSPITALIST

## 2021-09-29 PROCEDURE — 80053 COMPREHEN METABOLIC PANEL: CPT | Mod: HCNC | Performed by: HOSPITALIST

## 2021-09-30 ENCOUNTER — IMMUNIZATION (OUTPATIENT)
Dept: OBSTETRICS AND GYNECOLOGY | Facility: CLINIC | Age: 76
End: 2021-09-30
Payer: MEDICARE

## 2021-09-30 DIAGNOSIS — Z23 NEED FOR VACCINATION: Primary | ICD-10-CM

## 2021-09-30 PROCEDURE — 0003A COVID-19, MRNA, LNP-S, PF, 30 MCG/0.3 ML DOSE VACCINE: CPT | Mod: HCNC,PBBFAC | Performed by: FAMILY MEDICINE

## 2021-09-30 PROCEDURE — 91300 COVID-19, MRNA, LNP-S, PF, 30 MCG/0.3 ML DOSE VACCINE: CPT | Mod: HCNC,PBBFAC | Performed by: FAMILY MEDICINE

## 2021-10-05 ENCOUNTER — OFFICE VISIT (OUTPATIENT)
Dept: ENDOCRINOLOGY | Facility: CLINIC | Age: 76
End: 2021-10-05
Payer: MEDICARE

## 2021-10-05 VITALS
TEMPERATURE: 98 F | BODY MASS INDEX: 31.73 KG/M2 | SYSTOLIC BLOOD PRESSURE: 132 MMHG | DIASTOLIC BLOOD PRESSURE: 90 MMHG | HEART RATE: 72 BPM | WEIGHT: 190.69 LBS

## 2021-10-05 DIAGNOSIS — I10 ESSENTIAL (PRIMARY) HYPERTENSION: ICD-10-CM

## 2021-10-05 DIAGNOSIS — E21.0 HYPERPARATHYROIDISM, PRIMARY: Primary | ICD-10-CM

## 2021-10-05 DIAGNOSIS — E55.9 VITAMIN D DEFICIENCY: ICD-10-CM

## 2021-10-05 DIAGNOSIS — M85.80 OSTEOPENIA, UNSPECIFIED LOCATION: ICD-10-CM

## 2021-10-05 DIAGNOSIS — I10 ESSENTIAL HYPERTENSION: ICD-10-CM

## 2021-10-05 PROCEDURE — 99213 OFFICE O/P EST LOW 20 MIN: CPT | Mod: HCNC,S$GLB,, | Performed by: HOSPITALIST

## 2021-10-05 PROCEDURE — 3080F DIAST BP >= 90 MM HG: CPT | Mod: HCNC,CPTII,S$GLB, | Performed by: HOSPITALIST

## 2021-10-05 PROCEDURE — 99499 UNLISTED E&M SERVICE: CPT | Mod: S$GLB,,, | Performed by: HOSPITALIST

## 2021-10-05 PROCEDURE — 1126F PR PAIN SEVERITY QUANTIFIED, NO PAIN PRESENT: ICD-10-PCS | Mod: HCNC,CPTII,S$GLB, | Performed by: HOSPITALIST

## 2021-10-05 PROCEDURE — 99499 RISK ADDL DX/OHS AUDIT: ICD-10-PCS | Mod: S$GLB,,, | Performed by: HOSPITALIST

## 2021-10-05 PROCEDURE — 1160F PR REVIEW ALL MEDS BY PRESCRIBER/CLIN PHARMACIST DOCUMENTED: ICD-10-PCS | Mod: HCNC,CPTII,S$GLB, | Performed by: HOSPITALIST

## 2021-10-05 PROCEDURE — 3008F BODY MASS INDEX DOCD: CPT | Mod: HCNC,CPTII,S$GLB, | Performed by: HOSPITALIST

## 2021-10-05 PROCEDURE — 3080F PR MOST RECENT DIASTOLIC BLOOD PRESSURE >= 90 MM HG: ICD-10-PCS | Mod: HCNC,CPTII,S$GLB, | Performed by: HOSPITALIST

## 2021-10-05 PROCEDURE — 3075F SYST BP GE 130 - 139MM HG: CPT | Mod: HCNC,CPTII,S$GLB, | Performed by: HOSPITALIST

## 2021-10-05 PROCEDURE — 1126F AMNT PAIN NOTED NONE PRSNT: CPT | Mod: HCNC,CPTII,S$GLB, | Performed by: HOSPITALIST

## 2021-10-05 PROCEDURE — 3075F PR MOST RECENT SYSTOLIC BLOOD PRESS GE 130-139MM HG: ICD-10-PCS | Mod: HCNC,CPTII,S$GLB, | Performed by: HOSPITALIST

## 2021-10-05 PROCEDURE — 99213 PR OFFICE/OUTPT VISIT, EST, LEVL III, 20-29 MIN: ICD-10-PCS | Mod: HCNC,S$GLB,, | Performed by: HOSPITALIST

## 2021-10-05 PROCEDURE — 99999 PR PBB SHADOW E&M-EST. PATIENT-LVL III: ICD-10-PCS | Mod: PBBFAC,HCNC,, | Performed by: HOSPITALIST

## 2021-10-05 PROCEDURE — 3044F HG A1C LEVEL LT 7.0%: CPT | Mod: HCNC,CPTII,S$GLB, | Performed by: HOSPITALIST

## 2021-10-05 PROCEDURE — 3008F PR BODY MASS INDEX (BMI) DOCUMENTED: ICD-10-PCS | Mod: HCNC,CPTII,S$GLB, | Performed by: HOSPITALIST

## 2021-10-05 PROCEDURE — 1160F RVW MEDS BY RX/DR IN RCRD: CPT | Mod: HCNC,CPTII,S$GLB, | Performed by: HOSPITALIST

## 2021-10-05 PROCEDURE — 1159F PR MEDICATION LIST DOCUMENTED IN MEDICAL RECORD: ICD-10-PCS | Mod: HCNC,CPTII,S$GLB, | Performed by: HOSPITALIST

## 2021-10-05 PROCEDURE — 99999 PR PBB SHADOW E&M-EST. PATIENT-LVL III: CPT | Mod: PBBFAC,HCNC,, | Performed by: HOSPITALIST

## 2021-10-05 PROCEDURE — 1159F MED LIST DOCD IN RCRD: CPT | Mod: HCNC,CPTII,S$GLB, | Performed by: HOSPITALIST

## 2021-10-05 PROCEDURE — 3044F PR MOST RECENT HEMOGLOBIN A1C LEVEL <7.0%: ICD-10-PCS | Mod: HCNC,CPTII,S$GLB, | Performed by: HOSPITALIST

## 2021-10-05 RX ORDER — CHLORHEXIDINE GLUCONATE ORAL RINSE 1.2 MG/ML
SOLUTION DENTAL
COMMUNITY
Start: 2021-04-23

## 2021-10-05 RX ORDER — AMLODIPINE BESYLATE 5 MG/1
5 TABLET ORAL DAILY
Qty: 90 TABLET | Refills: 3 | Status: SHIPPED | OUTPATIENT
Start: 2021-10-05 | End: 2022-03-07 | Stop reason: SDUPTHER

## 2021-10-26 DIAGNOSIS — M25.569 ARTHRALGIA OF KNEE, UNSPECIFIED LATERALITY: ICD-10-CM

## 2021-10-26 RX ORDER — IBUPROFEN 800 MG/1
800 TABLET ORAL EVERY 8 HOURS PRN
Qty: 30 TABLET | Refills: 0 | Status: SHIPPED | OUTPATIENT
Start: 2021-10-26 | End: 2021-11-11 | Stop reason: SDUPTHER

## 2021-11-11 DIAGNOSIS — M25.569 ARTHRALGIA OF KNEE, UNSPECIFIED LATERALITY: ICD-10-CM

## 2021-11-15 RX ORDER — IBUPROFEN 800 MG/1
800 TABLET ORAL EVERY 8 HOURS PRN
Qty: 30 TABLET | Refills: 0 | Status: SHIPPED | OUTPATIENT
Start: 2021-11-15 | End: 2022-03-07 | Stop reason: SDUPTHER

## 2021-12-15 ENCOUNTER — OFFICE VISIT (OUTPATIENT)
Dept: FAMILY MEDICINE | Facility: CLINIC | Age: 76
End: 2021-12-15
Payer: MEDICARE

## 2021-12-15 VITALS
RESPIRATION RATE: 16 BRPM | OXYGEN SATURATION: 96 % | SYSTOLIC BLOOD PRESSURE: 134 MMHG | BODY MASS INDEX: 31.37 KG/M2 | DIASTOLIC BLOOD PRESSURE: 68 MMHG | HEART RATE: 106 BPM | TEMPERATURE: 98 F | WEIGHT: 188.5 LBS

## 2021-12-15 DIAGNOSIS — Z13.220 ENCOUNTER FOR LIPID SCREENING FOR CARDIOVASCULAR DISEASE: ICD-10-CM

## 2021-12-15 DIAGNOSIS — Z23 INFLUENZA VACCINE NEEDED: ICD-10-CM

## 2021-12-15 DIAGNOSIS — Z00.00 HEALTHCARE MAINTENANCE: ICD-10-CM

## 2021-12-15 DIAGNOSIS — E21.0 HYPERPARATHYROIDISM, PRIMARY: ICD-10-CM

## 2021-12-15 DIAGNOSIS — Z12.39 ENCOUNTER FOR SCREENING FOR MALIGNANT NEOPLASM OF BREAST, UNSPECIFIED SCREENING MODALITY: Primary | ICD-10-CM

## 2021-12-15 DIAGNOSIS — Z12.31 ENCOUNTER FOR SCREENING MAMMOGRAM FOR MALIGNANT NEOPLASM OF BREAST: ICD-10-CM

## 2021-12-15 DIAGNOSIS — I10 ESSENTIAL (PRIMARY) HYPERTENSION: ICD-10-CM

## 2021-12-15 DIAGNOSIS — Z13.6 ENCOUNTER FOR LIPID SCREENING FOR CARDIOVASCULAR DISEASE: ICD-10-CM

## 2021-12-15 PROCEDURE — 90694 VACC AIIV4 NO PRSRV 0.5ML IM: CPT | Mod: HCNC,S$GLB,, | Performed by: INTERNAL MEDICINE

## 2021-12-15 PROCEDURE — 99999 PR PBB SHADOW E&M-EST. PATIENT-LVL III: ICD-10-PCS | Mod: PBBFAC,HCNC,, | Performed by: INTERNAL MEDICINE

## 2021-12-15 PROCEDURE — 99214 OFFICE O/P EST MOD 30 MIN: CPT | Mod: 25,HCNC,S$GLB, | Performed by: INTERNAL MEDICINE

## 2021-12-15 PROCEDURE — G0008 FLU VACCINE - QUADRIVALENT - ADJUVANTED: ICD-10-PCS | Mod: HCNC,S$GLB,, | Performed by: INTERNAL MEDICINE

## 2021-12-15 PROCEDURE — G0008 ADMIN INFLUENZA VIRUS VAC: HCPCS | Mod: HCNC,S$GLB,, | Performed by: INTERNAL MEDICINE

## 2021-12-15 PROCEDURE — 99214 PR OFFICE/OUTPT VISIT, EST, LEVL IV, 30-39 MIN: ICD-10-PCS | Mod: 25,HCNC,S$GLB, | Performed by: INTERNAL MEDICINE

## 2021-12-15 PROCEDURE — 99999 PR PBB SHADOW E&M-EST. PATIENT-LVL III: CPT | Mod: PBBFAC,HCNC,, | Performed by: INTERNAL MEDICINE

## 2021-12-15 PROCEDURE — 90694 FLU VACCINE - QUADRIVALENT - ADJUVANTED: ICD-10-PCS | Mod: HCNC,S$GLB,, | Performed by: INTERNAL MEDICINE

## 2022-01-14 ENCOUNTER — HOSPITAL ENCOUNTER (OUTPATIENT)
Dept: RADIOLOGY | Facility: HOSPITAL | Age: 77
Discharge: HOME | End: 2022-01-14
Attending: INTERNAL MEDICINE
Payer: MEDICARE

## 2022-01-14 DIAGNOSIS — Z12.39 ENCOUNTER FOR SCREENING FOR MALIGNANT NEOPLASM OF BREAST, UNSPECIFIED SCREENING MODALITY: ICD-10-CM

## 2022-01-14 DIAGNOSIS — Z12.31 ENCOUNTER FOR SCREENING MAMMOGRAM FOR MALIGNANT NEOPLASM OF BREAST: ICD-10-CM

## 2022-01-14 PROCEDURE — 77067 SCR MAMMO BI INCL CAD: CPT | Mod: TC,HCNC,PO

## 2022-01-14 PROCEDURE — 77067 SCR MAMMO BI INCL CAD: CPT | Mod: 26,HCNC,, | Performed by: RADIOLOGY

## 2022-01-14 PROCEDURE — 77067 MAMMO DIGITAL SCREENING BILAT WITH TOMO: ICD-10-PCS | Mod: 26,HCNC,, | Performed by: RADIOLOGY

## 2022-01-14 PROCEDURE — 77063 BREAST TOMOSYNTHESIS BI: CPT | Mod: 26,HCNC,, | Performed by: RADIOLOGY

## 2022-01-14 PROCEDURE — 77063 MAMMO DIGITAL SCREENING BILAT WITH TOMO: ICD-10-PCS | Mod: 26,HCNC,, | Performed by: RADIOLOGY

## 2022-01-14 PROCEDURE — 77063 BREAST TOMOSYNTHESIS BI: CPT | Mod: TC,HCNC,PO

## 2022-03-07 DIAGNOSIS — I10 ESSENTIAL HYPERTENSION: ICD-10-CM

## 2022-03-07 DIAGNOSIS — M25.569 ARTHRALGIA OF KNEE, UNSPECIFIED LATERALITY: ICD-10-CM

## 2022-03-07 RX ORDER — IBUPROFEN 800 MG/1
800 TABLET ORAL EVERY 8 HOURS PRN
Qty: 30 TABLET | Refills: 0 | Status: SHIPPED | OUTPATIENT
Start: 2022-03-07 | End: 2022-07-26

## 2022-03-07 RX ORDER — AMLODIPINE BESYLATE 5 MG/1
5 TABLET ORAL DAILY
Qty: 90 TABLET | Refills: 3 | Status: SHIPPED | OUTPATIENT
Start: 2022-03-07 | End: 2022-11-23 | Stop reason: SDUPTHER

## 2022-03-07 NOTE — TELEPHONE ENCOUNTER
----- Message from Monique Elise sent at 3/7/2022  4:03 PM CST -----  Type: RX Refill Request    Who Called: self     Have you contacted your pharmacy: yes     Refill or New Rx: refill     RX Name and Strength: amLODIPine (NORVASC) 5 MG tablet, ibuprofen (ADVIL,MOTRIN) 800 MG tablet    Preferred Pharmacy with phone number:   [id number] - [address] AT [facility] [place]  [place]  [address] [place], [place] [address] [address]  Phone: [phone] Fax: [phone]    [facility]   [phone]     Local or Mail Order: local and mail     Would the patient rather a call back or a response via [facility]? Call back     Best Call Back Number: [phone]    Add info: Patient needs a supply of meds until [facility] can ship out her medications. Could not provide [facility] address.

## 2022-05-25 ENCOUNTER — HOSPITAL ENCOUNTER (EMERGENCY)
Facility: HOSPITAL | Age: 77
Discharge: HOME | End: 2022-05-25
Attending: EMERGENCY MEDICINE
Payer: COMMERCIAL

## 2022-05-25 VITALS
OXYGEN SATURATION: 98 % | DIASTOLIC BLOOD PRESSURE: 77 MMHG | BODY MASS INDEX: 28.32 KG/M2 | RESPIRATION RATE: 20 BRPM | SYSTOLIC BLOOD PRESSURE: 143 MMHG | WEIGHT: 170 LBS | HEIGHT: 65 IN | TEMPERATURE: 98 F | HEART RATE: 75 BPM

## 2022-05-25 DIAGNOSIS — N12 PYELONEPHRITIS: ICD-10-CM

## 2022-05-25 DIAGNOSIS — R50.9 ACUTE FEBRILE ILLNESS: Primary | ICD-10-CM

## 2022-05-25 LAB
ALBUMIN SERPL-MCNC: 3.5 G/DL (ref 3.3–5.5)
ALP SERPL-CCNC: 109 U/L (ref 42–141)
BILIRUB SERPL-MCNC: 0.7 MG/DL (ref 0.2–1.6)
BILIRUBIN, POC UA: NEGATIVE
BLOOD, POC UA: NEGATIVE
BUN SERPL-MCNC: 13 MG/DL (ref 7–22)
CALCIUM SERPL-MCNC: 10.2 MG/DL (ref 8–10.3)
CHLORIDE SERPL-SCNC: 102 MMOL/L (ref 98–108)
CLARITY, POC UA: CLEAR
COLOR, POC UA: YELLOW
CREAT SERPL-MCNC: 1.2 MG/DL (ref 0.6–1.2)
GLUCOSE SERPL-MCNC: 116 MG/DL (ref 73–118)
GLUCOSE, POC UA: NEGATIVE
HCT, POC: NORMAL
HGB, POC: NORMAL (ref 14–18)
INFLUENZA A ANTIGEN, POC: NEGATIVE
INFLUENZA B ANTIGEN, POC: NEGATIVE
KETONES, POC UA: NEGATIVE
LEUKOCYTE EST, POC UA: ABNORMAL
MCH, POC: NORMAL
MCHC, POC: NORMAL
MCV, POC: NORMAL
MPV, POC: NORMAL
NITRITE, POC UA: NEGATIVE
PH UR STRIP: 6 [PH]
POC ALT (SGPT): 18 U/L (ref 10–47)
POC AST (SGOT): 29 U/L (ref 11–38)
POC PLATELET COUNT: NORMAL
POC TCO2: 26 MMOL/L (ref 18–33)
POCT GLUCOSE: 88 MG/DL (ref 70–110)
POTASSIUM BLD-SCNC: 4.3 MMOL/L (ref 3.6–5.1)
PROTEIN, POC UA: NEGATIVE
PROTEIN, POC: 7.6 G/DL (ref 6.4–8.1)
RBC, POC: NORMAL
RDW, POC: NORMAL
SODIUM BLD-SCNC: 134 MMOL/L (ref 128–145)
SPECIFIC GRAVITY, POC UA: 1.01
UROBILINOGEN, POC UA: 0.2 E.U./DL
WBC, POC: NORMAL

## 2022-05-25 PROCEDURE — 85025 COMPLETE CBC W/AUTO DIFF WBC: CPT | Mod: ER

## 2022-05-25 PROCEDURE — 25000003 PHARM REV CODE 250: Mod: ER | Performed by: EMERGENCY MEDICINE

## 2022-05-25 PROCEDURE — 96365 THER/PROPH/DIAG IV INF INIT: CPT | Mod: ER

## 2022-05-25 PROCEDURE — 80053 COMPREHEN METABOLIC PANEL: CPT | Mod: ER

## 2022-05-25 PROCEDURE — 82962 GLUCOSE BLOOD TEST: CPT | Mod: ER

## 2022-05-25 PROCEDURE — 81003 URINALYSIS AUTO W/O SCOPE: CPT | Mod: ER

## 2022-05-25 PROCEDURE — 63600175 PHARM REV CODE 636 W HCPCS: Mod: ER | Performed by: EMERGENCY MEDICINE

## 2022-05-25 PROCEDURE — 99285 EMERGENCY DEPT VISIT HI MDM: CPT | Mod: 25,ER

## 2022-05-25 RX ORDER — ACETAMINOPHEN 500 MG
1000 TABLET ORAL
Status: COMPLETED | OUTPATIENT
Start: 2022-05-25 | End: 2022-05-25

## 2022-05-25 RX ORDER — IBUPROFEN 600 MG/1
600 TABLET ORAL EVERY 6 HOURS PRN
Qty: 20 TABLET | Refills: 0 | Status: SHIPPED | OUTPATIENT
Start: 2022-05-25 | End: 2022-07-26 | Stop reason: SDUPTHER

## 2022-05-25 RX ORDER — CIPROFLOXACIN 500 MG/1
500 TABLET ORAL 2 TIMES DAILY
Qty: 20 TABLET | Refills: 0 | Status: SHIPPED | OUTPATIENT
Start: 2022-05-25 | End: 2022-06-04

## 2022-05-25 RX ADMIN — CEFTRIAXONE 1 G: 1 INJECTION, SOLUTION INTRAVENOUS at 06:05

## 2022-05-25 RX ADMIN — ACETAMINOPHEN 1000 MG: 500 TABLET ORAL at 01:05

## 2022-05-25 NOTE — FIRST PROVIDER EVALUATION
Emergency Department TeleTriage Encounter Note      CHIEF COMPLAINT    Chief Complaint   Patient presents with    Abdominal Pain     For a week    Leg Pain     Bilateral leg pain x 1 week, denies injury or fall, poor historian. Reports also increased weakness       VITAL SIGNS   Initial Vitals [05/25/22 1309]   BP Pulse Resp Temp SpO2   (!) 154/81 109 20 (!) 102.6 °F (39.2 °C) 95 %      MAP       --            ALLERGIES    Review of patient's allergies indicates:  No Known Allergies    PROVIDER TRIAGE NOTE  Patient presenting febrile (unknown to patient), low back pain and lower abdominal pain gradually worsening over past week. No urinary symptoms, diarrhea or vomiting. No respiratory symptoms. She was treated recently for dfental infection, but denies dental pain, facial swelling or headache.       ORDERS  Labs Reviewed   URINALYSIS, REFLEX TO URINE CULTURE   CBC W/ AUTO DIFFERENTIAL   COMPREHENSIVE METABOLIC PANEL       ED Orders (720h ago, onward)    Start Ordered     Status Ordering Provider    05/25/22 1348 05/25/22 1348  CBC auto differential  STAT         Ordered PANCOAST, WARREN H.    05/25/22 1348 05/25/22 1348  Comprehensive metabolic panel  STAT         Ordered PANCOAST, WARREN H.    05/25/22 1348 05/25/22 1348  Insert Saline lock IV  Once         Ordered PANCOAST, WARREN H.    05/25/22 1347 05/25/22 1346  Urinalysis, Reflex to Urine Culture Urine, Clean Catch  STAT         Ordered PANCOAST, WARREN H.    05/25/22 1330 05/25/22 1322  acetaminophen tablet 1,000 mg  ED 1 Time         Last MAR action: Given - by SYLVIA MARTINES on 05/25/22 at 1342 DENISSE VILLANUEVA            Virtual Visit Note: The provider triage portion of this emergency department evaluation and documentation was performed via [employer], a HIPAA-compliant telemedicine application, in concert with a tele-presenter in the room. A face to face patient evaluation with one of my colleagues will occur once the patient is placed in an  emergency department room.      DISCLAIMER: This note was prepared with [employer] voice recognition transcription software. Garbled syntax, mangled pronouns, and other bizarre constructions may be attributed to that software system.

## 2022-05-25 NOTE — DISCHARGE INSTRUCTIONS
Drink lots of water.  Take ALL of the prescribed antibiotics.  Continue motrin for pain or fever.  If you have fever not responding to motrin or tylenol, severe pain, nausea and vomiting, are unable to take the prescribed antibiotics, or worsening symptoms - return to the ER. You may need to be admitted.

## 2022-05-25 NOTE — ED PROVIDER NOTES
Encounter Date: 5/25/2022    SCRIBE #1 NOTE: I, Emile Alfonso, am scribing for, and in the presence of,  Tenisha Mg MD. I have scribed the following portions of the note - Other sections scribed: HPI,ROS,PE.       History     Chief Complaint   Patient presents with    Abdominal Pain     For a week    Leg Pain     Bilateral leg pain x 1 week, denies injury or fall, poor historian. Reports also increased weakness     Patient is a 77 year old female with PMHx of HTN who presents to ED with complaints of back pain and abdominal pain that began a few days ago.  Lower back pain was acutely worse this morning, making it difficult to get out of bed.  She also reports dysuria earlier in the week that is now resolved. She is febrile at 102.6 F but was unaware she had fever.  She was recently on antibiotics for a dental infection.  Patient also states that she has unrelated bilateral knee pain that she is taking Ibuprofen for. She was given Tylenol at ED, which has helped her back pain significantly. Denies any associated urinary frequency, vomiting, loss of appetite, cold symptoms, or coughing. She endorses smoking tobacco. No other complaints at this time.     The history is provided by the patient. No  was used.     Review of patient's allergies indicates:  No Known Allergies  Past Medical History:   Diagnosis Date    History of TB skin testing     Hyperparathyroidism, unspecified     Hypertension     Osteopenia     Overweight(278.02)      Past Surgical History:   Procedure Laterality Date    COLONOSCOPY N/A 10/29/2019    Procedure: COLONOSCOPY;  Surgeon: Amy Alex MD;  Location: [facility];  Service: Endoscopy;  Laterality: N/A;  confirmed appt-sp    lasik and a rotator cuff surgery      SHOULDER SURGERY       Family History   Problem Relation Age of Onset    Stroke Sister     Diabetes Neg Hx     Thyroid cancer Neg Hx     Osteoporosis Neg Hx      Social History     Tobacco Use     Smoking status: Never Smoker    Smokeless tobacco: Never Used   Substance Use Topics    Alcohol use: No     Alcohol/week: 0.0 standard drinks    Drug use: No     Review of Systems   Constitutional: Positive for fever. Negative for activity change, appetite change and chills.   HENT: Negative for congestion, rhinorrhea, sneezing and sore throat.    Respiratory: Negative for cough, choking, shortness of breath and wheezing.    Cardiovascular: Negative for chest pain and palpitations.   Gastrointestinal: Positive for abdominal pain. Negative for diarrhea, nausea and vomiting.   Genitourinary: Positive for dysuria (resolved). Negative for frequency, hematuria and urgency.   Musculoskeletal: Positive for back pain.   Neurological: Negative for dizziness, syncope, light-headedness and headaches.   All other systems reviewed and are negative.      Physical Exam     Initial Vitals [05/25/22 1309]   BP Pulse Resp Temp SpO2   (!) 154/81 109 20 (!) 102.6 °F (39.2 °C) 95 %      MAP       --         Physical Exam    Nursing note and vitals reviewed.  Constitutional: She appears well-developed and well-nourished. No distress.   HENT:   Head: Normocephalic and atraumatic.   Eyes: Conjunctivae are normal.   Neck:   Normal range of motion.  Cardiovascular: Normal rate, regular rhythm and normal heart sounds.   No murmur heard.  Pulmonary/Chest: Breath sounds normal. No respiratory distress.   Abdominal: Bowel sounds are normal. She exhibits no distension. There is abdominal tenderness in the right lower quadrant. There is no rebound and no guarding.   Musculoskeletal:         General: Normal range of motion.      Cervical back: Normal range of motion.     Neurological: She is alert and oriented to person, place, and time.   Skin: Skin is warm and dry.   Psychiatric: She has a normal mood and affect. Her behavior is normal.         ED Course   Procedures  Labs Reviewed   POCT URINALYSIS W/O SCOPE - Abnormal; Notable for the  following components:       Result Value    Leukocytes, UA 1+ (*)     All other components within normal limits   POCT CBC   POCT URINALYSIS W/O SCOPE   POCT INFLUENZA A/B MOLECULAR   SARS-COV-2 RDRP GENE   POCT CMP   POCT CMP   POCT RAPID INFLUENZA A/B          Imaging Results          CT Abdomen Pelvis  Without Contrast (Final result)  Result time 05/25/22 17:04:48    Final result by Bernardo Reese MD (05/25/22 17:04:48)                 Impression:      1. Right perinephric and periureteral inflammation.  Finding is nonspecific and could reflect sequela of recently passed calculus or infection, correlation is advised.  There is right nonobstructive nephrolithiasis.  2. Colonic diverticulosis noting slight indistinctness about a few diverticula involving the descending colon.  This is suspected to be on the basis of motion artifact and adjacent perinephric inflammation however correlation with any current symptomatology to suggest early changes of acute diverticulitis recommended.  3. Mild pericardial effusion.  4. Please see above for additional findings.      Electronically signed by: Bernardo Reese MD  Date:    05/25/2022  Time:    17:04             Narrative:    EXAMINATION:  CT ABDOMEN PELVIS WITHOUT CONTRAST    CLINICAL HISTORY:  RLQ abdominal pain (Age >= 14y);    TECHNIQUE:  Low dose axial images, sagittal and coronal reformations were obtained from the lung bases to the pubic symphysis.  Oral contrast was not administered.    COMPARISON:  None    FINDINGS:  Images of the lower thorax are remarkable for bilateral dependent atelectasis.  There is a mild pericardial effusion.  There is a moderate hiatal hernia.    The liver, spleen, pancreas, gallbladder and adrenal glands have a grossly unremarkable noncontrast appearance.  There is no biliary dilation or ascites.  The pancreatic duct is not dilated.  No significant abdominal lymphadenopathy.    The left kidney has a grossly unremarkable noncontrast  appearance without hydronephrosis or nephrolithiasis.  The left ureter is unremarkable without calculi seen.  There is right perinephric and periureteral inflammation.  There is right nonobstructive nephrolithiasis.  No right hydronephrosis.  The right ureter is unable to be followed in its entirety to the urinary bladder.  No definite calculi seen along its course.  The urinary bladder is unremarkable.  The uterus and adnexa are unremarkable.    There is a large amount of stool within the rectum without rectal wall thickening.  There are several scattered colonic diverticula noting some indistinctness about a few diverticula involving the descending colon..  The terminal ileum and appendix are grossly unremarkable.  The small bowel is unremarkable.  Several scattered shotty to mildly prominent periaortic, pericaval, and mesenteric lymph nodes are noted.  There is atherosclerotic calcification of the aorta and its branches.  No focal organized pelvic fluid collection.    Degenerative changes are noted of the pubic symphysis and spine.  There is osteopenia.                               X-Ray Chest AP Portable (Final result)  Result time 05/25/22 15:19:53    Final result by Gumaro Pérez MD (05/25/22 15:19:53)                 Impression:      See above      Electronically signed by: Gumaro Pérez MD  Date:    05/25/2022  Time:    15:19             Narrative:    EXAMINATION:  XR CHEST AP PORTABLE    CLINICAL HISTORY:  Chest Pain;    TECHNIQUE:  Single frontal view of the chest was performed.    COMPARISON:  None    FINDINGS:  Heart size normal. .  Slight opacification identified at the right lung base and the small amount of underlying pleural effusion and volume loss cannot be ruled out.  The upper lung fields are clear.                                 Medications   cefTRIAXone (ROCEPHIN) 1 g/50 mL D5W IVPB (1 g Intravenous New Bag 5/25/22 1821)   acetaminophen tablet 1,000 mg (1,000 mg Oral Given 5/25/22 1342)      Medical Decision Making:   History:   Old Medical Records: I decided to obtain old medical records.  Independently Interpreted Test(s):   I have ordered and independently interpreted X-rays - see prior notes.  Clinical Tests:   Lab Tests: Ordered and Reviewed  Radiological Study: Ordered and Reviewed  ED Management:  Work up shows pyelonephritis - given rocephin in ER.  No dehydration, KALLI, n/v, looks well.  Able to treat with PO antibiotics.  Strict return precautions give.          Scribe Attestation:   Scribe #1: I performed the above scribed service and the documentation accurately describes the services I performed. I attest to the accuracy of the note.                 I, Dr. Tenisha Mg, personally performed the services described in this documentation.   All medical record entries made by the scribe were at my direction and in my presence.   I have reviewed the chart and agree that the record is accurate and complete.   Tenisha Mg MD.  3:06 PM 05/25/2022   Clinical Impression:   Final diagnoses:  [R50.9] Acute febrile illness (Primary)  [N12] Pyelonephritis                 Tenisha Mg MD  05/25/22 1829

## 2022-06-17 ENCOUNTER — OFFICE VISIT (OUTPATIENT)
Dept: FAMILY MEDICINE | Facility: CLINIC | Age: 77
End: 2022-06-17
Payer: MEDICARE

## 2022-06-17 ENCOUNTER — LAB VISIT (OUTPATIENT)
Dept: LAB | Facility: HOSPITAL | Age: 77
End: 2022-06-17
Attending: HOSPITALIST
Payer: MEDICARE

## 2022-06-17 ENCOUNTER — OFFICE VISIT (OUTPATIENT)
Dept: ENDOCRINOLOGY | Facility: CLINIC | Age: 77
End: 2022-06-17
Payer: MEDICARE

## 2022-06-17 VITALS
TEMPERATURE: 97 F | WEIGHT: 183.88 LBS | RESPIRATION RATE: 18 BRPM | BODY MASS INDEX: 30.6 KG/M2 | DIASTOLIC BLOOD PRESSURE: 101 MMHG | SYSTOLIC BLOOD PRESSURE: 165 MMHG | HEART RATE: 89 BPM

## 2022-06-17 VITALS
HEART RATE: 62 BPM | DIASTOLIC BLOOD PRESSURE: 94 MMHG | BODY MASS INDEX: 30.56 KG/M2 | OXYGEN SATURATION: 97 % | RESPIRATION RATE: 18 BRPM | HEIGHT: 65 IN | TEMPERATURE: 98 F | WEIGHT: 183.44 LBS | SYSTOLIC BLOOD PRESSURE: 162 MMHG

## 2022-06-17 DIAGNOSIS — E03.8 OTHER SPECIFIED HYPOTHYROIDISM: ICD-10-CM

## 2022-06-17 DIAGNOSIS — M17.0 BILATERAL PRIMARY OSTEOARTHRITIS OF KNEE: Primary | ICD-10-CM

## 2022-06-17 DIAGNOSIS — E21.0 HYPERPARATHYROIDISM, PRIMARY: ICD-10-CM

## 2022-06-17 DIAGNOSIS — M85.89 OSTEOPENIA OF MULTIPLE SITES: Primary | ICD-10-CM

## 2022-06-17 DIAGNOSIS — I10 ESSENTIAL (PRIMARY) HYPERTENSION: ICD-10-CM

## 2022-06-17 DIAGNOSIS — N20.0 NEPHROLITHIASIS: ICD-10-CM

## 2022-06-17 LAB
25(OH)D3+25(OH)D2 SERPL-MCNC: 31 NG/ML (ref 30–96)
ALBUMIN SERPL BCP-MCNC: 3.8 G/DL (ref 3.5–5.2)
ALP SERPL-CCNC: 154 U/L (ref 55–135)
ALT SERPL W/O P-5'-P-CCNC: 10 U/L (ref 10–44)
ANION GAP SERPL CALC-SCNC: 11 MMOL/L (ref 8–16)
AST SERPL-CCNC: 16 U/L (ref 10–40)
BILIRUB SERPL-MCNC: 0.4 MG/DL (ref 0.1–1)
BUN SERPL-MCNC: 10 MG/DL (ref 8–23)
CALCIUM SERPL-MCNC: 10.1 MG/DL (ref 8.7–10.5)
CHLORIDE SERPL-SCNC: 105 MMOL/L (ref 95–110)
CO2 SERPL-SCNC: 24 MMOL/L (ref 23–29)
CREAT SERPL-MCNC: 1.2 MG/DL (ref 0.5–1.4)
EST. GFR  (AFRICAN AMERICAN): 51.1 ML/MIN/1.73 M^2
EST. GFR  (NON AFRICAN AMERICAN): 44.3 ML/MIN/1.73 M^2
GLUCOSE SERPL-MCNC: 97 MG/DL (ref 70–110)
POTASSIUM SERPL-SCNC: 3.9 MMOL/L (ref 3.5–5.1)
PROT SERPL-MCNC: 7.6 G/DL (ref 6–8.4)
PTH-INTACT SERPL-MCNC: 97.5 PG/ML (ref 9–77)
SODIUM SERPL-SCNC: 140 MMOL/L (ref 136–145)
T4 FREE SERPL-MCNC: 0.86 NG/DL (ref 0.71–1.51)
THYROPEROXIDASE IGG SERPL-ACNC: <6 IU/ML
TSH SERPL DL<=0.005 MIU/L-ACNC: 3.14 UIU/ML (ref 0.4–4)

## 2022-06-17 PROCEDURE — 1101F PT FALLS ASSESS-DOCD LE1/YR: CPT | Mod: CPTII,S$GLB,, | Performed by: HOSPITALIST

## 2022-06-17 PROCEDURE — 3044F PR MOST RECENT HEMOGLOBIN A1C LEVEL <7.0%: ICD-10-PCS | Mod: CPTII,S$GLB,, | Performed by: NURSE PRACTITIONER

## 2022-06-17 PROCEDURE — 1159F MED LIST DOCD IN RCRD: CPT | Mod: CPTII,S$GLB,, | Performed by: HOSPITALIST

## 2022-06-17 PROCEDURE — 99214 OFFICE O/P EST MOD 30 MIN: CPT | Mod: S$GLB,,, | Performed by: NURSE PRACTITIONER

## 2022-06-17 PROCEDURE — 1125F AMNT PAIN NOTED PAIN PRSNT: CPT | Mod: CPTII,S$GLB,, | Performed by: NURSE PRACTITIONER

## 2022-06-17 PROCEDURE — 99999 PR PBB SHADOW E&M-EST. PATIENT-LVL III: CPT | Mod: PBBFAC,,, | Performed by: HOSPITALIST

## 2022-06-17 PROCEDURE — 84443 ASSAY THYROID STIM HORMONE: CPT | Performed by: HOSPITALIST

## 2022-06-17 PROCEDURE — 1125F PR PAIN SEVERITY QUANTIFIED, PAIN PRESENT: ICD-10-PCS | Mod: CPTII,S$GLB,, | Performed by: HOSPITALIST

## 2022-06-17 PROCEDURE — 99214 OFFICE O/P EST MOD 30 MIN: CPT | Mod: S$GLB,,, | Performed by: HOSPITALIST

## 2022-06-17 PROCEDURE — 99499 UNLISTED E&M SERVICE: CPT | Mod: S$GLB,,, | Performed by: HOSPITALIST

## 2022-06-17 PROCEDURE — 3044F HG A1C LEVEL LT 7.0%: CPT | Mod: CPTII,S$GLB,, | Performed by: NURSE PRACTITIONER

## 2022-06-17 PROCEDURE — 1125F PR PAIN SEVERITY QUANTIFIED, PAIN PRESENT: ICD-10-PCS | Mod: CPTII,S$GLB,, | Performed by: NURSE PRACTITIONER

## 2022-06-17 PROCEDURE — 3077F PR MOST RECENT SYSTOLIC BLOOD PRESSURE >= 140 MM HG: ICD-10-PCS | Mod: CPTII,S$GLB,, | Performed by: HOSPITALIST

## 2022-06-17 PROCEDURE — 1159F PR MEDICATION LIST DOCUMENTED IN MEDICAL RECORD: ICD-10-PCS | Mod: CPTII,S$GLB,, | Performed by: HOSPITALIST

## 2022-06-17 PROCEDURE — 99999 PR PBB SHADOW E&M-EST. PATIENT-LVL V: CPT | Mod: PBBFAC,,, | Performed by: NURSE PRACTITIONER

## 2022-06-17 PROCEDURE — 99214 PR OFFICE/OUTPT VISIT, EST, LEVL IV, 30-39 MIN: ICD-10-PCS | Mod: S$GLB,,, | Performed by: NURSE PRACTITIONER

## 2022-06-17 PROCEDURE — 99999 PR PBB SHADOW E&M-EST. PATIENT-LVL III: ICD-10-PCS | Mod: PBBFAC,,, | Performed by: HOSPITALIST

## 2022-06-17 PROCEDURE — 3288F FALL RISK ASSESSMENT DOCD: CPT | Mod: CPTII,S$GLB,, | Performed by: HOSPITALIST

## 2022-06-17 PROCEDURE — 3044F PR MOST RECENT HEMOGLOBIN A1C LEVEL <7.0%: ICD-10-PCS | Mod: CPTII,S$GLB,, | Performed by: HOSPITALIST

## 2022-06-17 PROCEDURE — 1159F MED LIST DOCD IN RCRD: CPT | Mod: CPTII,S$GLB,, | Performed by: NURSE PRACTITIONER

## 2022-06-17 PROCEDURE — 3288F FALL RISK ASSESSMENT DOCD: CPT | Mod: CPTII,S$GLB,, | Performed by: NURSE PRACTITIONER

## 2022-06-17 PROCEDURE — 83970 ASSAY OF PARATHORMONE: CPT | Performed by: HOSPITALIST

## 2022-06-17 PROCEDURE — 99499 RISK ADDL DX/OHS AUDIT: ICD-10-PCS | Mod: S$GLB,,, | Performed by: HOSPITALIST

## 2022-06-17 PROCEDURE — 36415 COLL VENOUS BLD VENIPUNCTURE: CPT | Mod: PO | Performed by: HOSPITALIST

## 2022-06-17 PROCEDURE — 80053 COMPREHEN METABOLIC PANEL: CPT | Performed by: HOSPITALIST

## 2022-06-17 PROCEDURE — 3288F PR FALLS RISK ASSESSMENT DOCUMENTED: ICD-10-PCS | Mod: CPTII,S$GLB,, | Performed by: HOSPITALIST

## 2022-06-17 PROCEDURE — 3077F PR MOST RECENT SYSTOLIC BLOOD PRESSURE >= 140 MM HG: ICD-10-PCS | Mod: CPTII,S$GLB,, | Performed by: NURSE PRACTITIONER

## 2022-06-17 PROCEDURE — 1101F PT FALLS ASSESS-DOCD LE1/YR: CPT | Mod: CPTII,S$GLB,, | Performed by: NURSE PRACTITIONER

## 2022-06-17 PROCEDURE — 3080F PR MOST RECENT DIASTOLIC BLOOD PRESSURE >= 90 MM HG: ICD-10-PCS | Mod: CPTII,S$GLB,, | Performed by: HOSPITALIST

## 2022-06-17 PROCEDURE — 99499 RISK ADDL DX/OHS AUDIT: ICD-10-PCS | Mod: S$GLB,,, | Performed by: NURSE PRACTITIONER

## 2022-06-17 PROCEDURE — 86376 MICROSOMAL ANTIBODY EACH: CPT | Performed by: HOSPITALIST

## 2022-06-17 PROCEDURE — 99499 UNLISTED E&M SERVICE: CPT | Mod: S$GLB,,, | Performed by: NURSE PRACTITIONER

## 2022-06-17 PROCEDURE — 99999 PR PBB SHADOW E&M-EST. PATIENT-LVL V: ICD-10-PCS | Mod: PBBFAC,,, | Performed by: NURSE PRACTITIONER

## 2022-06-17 PROCEDURE — 3080F DIAST BP >= 90 MM HG: CPT | Mod: CPTII,S$GLB,, | Performed by: NURSE PRACTITIONER

## 2022-06-17 PROCEDURE — 3077F SYST BP >= 140 MM HG: CPT | Mod: CPTII,S$GLB,, | Performed by: NURSE PRACTITIONER

## 2022-06-17 PROCEDURE — 3288F PR FALLS RISK ASSESSMENT DOCUMENTED: ICD-10-PCS | Mod: CPTII,S$GLB,, | Performed by: NURSE PRACTITIONER

## 2022-06-17 PROCEDURE — 84439 ASSAY OF FREE THYROXINE: CPT | Performed by: HOSPITALIST

## 2022-06-17 PROCEDURE — 1160F PR REVIEW ALL MEDS BY PRESCRIBER/CLIN PHARMACIST DOCUMENTED: ICD-10-PCS | Mod: CPTII,S$GLB,, | Performed by: NURSE PRACTITIONER

## 2022-06-17 PROCEDURE — 82306 VITAMIN D 25 HYDROXY: CPT | Performed by: HOSPITALIST

## 2022-06-17 PROCEDURE — 1159F PR MEDICATION LIST DOCUMENTED IN MEDICAL RECORD: ICD-10-PCS | Mod: CPTII,S$GLB,, | Performed by: NURSE PRACTITIONER

## 2022-06-17 PROCEDURE — 3080F PR MOST RECENT DIASTOLIC BLOOD PRESSURE >= 90 MM HG: ICD-10-PCS | Mod: CPTII,S$GLB,, | Performed by: NURSE PRACTITIONER

## 2022-06-17 PROCEDURE — 1101F PR PT FALLS ASSESS DOC 0-1 FALLS W/OUT INJ PAST YR: ICD-10-PCS | Mod: CPTII,S$GLB,, | Performed by: NURSE PRACTITIONER

## 2022-06-17 PROCEDURE — 99214 PR OFFICE/OUTPT VISIT, EST, LEVL IV, 30-39 MIN: ICD-10-PCS | Mod: S$GLB,,, | Performed by: HOSPITALIST

## 2022-06-17 PROCEDURE — 1101F PR PT FALLS ASSESS DOC 0-1 FALLS W/OUT INJ PAST YR: ICD-10-PCS | Mod: CPTII,S$GLB,, | Performed by: HOSPITALIST

## 2022-06-17 PROCEDURE — 3077F SYST BP >= 140 MM HG: CPT | Mod: CPTII,S$GLB,, | Performed by: HOSPITALIST

## 2022-06-17 PROCEDURE — 3080F DIAST BP >= 90 MM HG: CPT | Mod: CPTII,S$GLB,, | Performed by: HOSPITALIST

## 2022-06-17 PROCEDURE — 3044F HG A1C LEVEL LT 7.0%: CPT | Mod: CPTII,S$GLB,, | Performed by: HOSPITALIST

## 2022-06-17 PROCEDURE — 1125F AMNT PAIN NOTED PAIN PRSNT: CPT | Mod: CPTII,S$GLB,, | Performed by: HOSPITALIST

## 2022-06-17 PROCEDURE — 1160F RVW MEDS BY RX/DR IN RCRD: CPT | Mod: CPTII,S$GLB,, | Performed by: NURSE PRACTITIONER

## 2022-06-17 NOTE — PROGRESS NOTES
Subjective:      Patient ID: Marsha Chun is a 77 y.o. female presented to Endocrinology clinic on 6/17/2022.    Chief Complaint:  Hyperthyroidism    History of Present Illness: Marsha Chun is a 77 y.o. female with history of primary hyperparathyroidism.  Other significant past by for history including: hypertension, obesity      Interval history:  No acute issue since last visit.  Doing well at this time.    Hypertension noted, saw PCP team, plan to follow up soon for blood pressure check  Bone density done showing: osteopenia, vitamin-D, renal function: stable.  PTH trending down  CT abd: 5/22: There is right nonobstructive nephrolithiasis.>> seen urologist      1) In regard to Primary Hyperparathyroidism  Patient was previously by Dr. Jaquez on 2/2019, previously seen by Dr. Flynn in 2013.  Patient is following up with me at this time.  She was first noted to have hypercalcemia in 2009 - this was the only episode noted   Her PTH was first checked at that time and was elevated   She was on hctz at that time which was stopped and since there has been normalization of the calcium but the pth has remained elevated     Sestamibi 2011    Impression:                                                                                                                                No findings to suggest parathyroid adenoma.                                                                                  U/s 2011   IMPRESSION:  No significant abnormalities are identified.                                                                                  Patient fell 2 months ago leading to a left knee injury, doing physical therapy at this time  Uses cane to assist with ambulation  Vit D supplement:  5000 IU daily  Not on any calcium supplements, Tums    No   Yes  [x]    []  HCTZ   [x]    []  Lithium  [x]    []  Chlorthalidone>> off medication for the last 3 month      Dietary intake of calcium:  "  Occasionally will eat cheese    No   Yes  [x]    []  Fractures  []    [x]  Osteopenia/osteoporosis  [x]    []  >2" height loss  [x]    [x]  Kidney stones  [x]    []  GFR <60  [x]    []  Constipation  [x]    []  Bone pain  [x]    []  Muscle spasm    Lab Results   Component Value Date    PTH 82.0 (H) 09/29/2021    .0 (H) 02/22/2021    .0 (H) 01/20/2020    KJLOQMBF99CZ 35 09/29/2021    FHELKTLG72PG 35 02/22/2021    AOWHQKIK35GV 31 01/21/2019    CALCIUM 10.2 01/14/2022    CALCIUM 10.3 09/29/2021    CALCIUM 9.7 02/22/2021    PHOS 3.3 02/22/2021    PHOS 4.0 07/11/2016    PHOS 3.9 06/24/2015    ALKPHOS 156 (H) 01/14/2022    ALKPHOS 124 09/29/2021    ALKPHOS 171 (H) 02/22/2021    TSH 4.316 (H) 01/14/2022    LABCALC 6.6 02/22/2021    BABSASJ69HYR 6 02/22/2021      Latest Reference Range & Units 07/11/16 09:55 01/04/18 09:10 01/21/19 09:32 01/20/20 08:43 02/22/21 09:41 09/29/21 15:14 01/14/22 08:30   Calcium 8.7 - 10.5 mg/dL 9.8 10.4 9.8 9.0 9.7 10.3 10.2   Ionized Calcium 1.06 - 1.42 mmol/L    1.29      Vit D, 25-Hydroxy 30 - 96 ng/mL 32 27 (L) 31  35 35    PTH 9.0 - 77.0 pg/mL 93.0 (H) 81.3 (H) 111.9 (H) 130.0 (H) 123.0 (H) 82.0 (H)      Calcium/Creatinine Clearance Ratio = 0.010   Latest Reference Range & Units 02/22/21 09:23   Calcium, Urine 0.0 - 15.0 mg/dL 6.6   Calcium, 24 Hr Urine 4 - 12 mg/Hr 6   Calcium, Urine (mg/spec) mg/Spec 135   Creatinine, Urinr (mg/spec) mg/Spec 1066.0   Creatinine, Timed Urine 40.0 - 75.0 mg/Hr 44.4       2) Osteopenia  Postmenopausal symptoms age 52  Never had hysterectomy  No family history of osteoporosis that she knows    2/21/21  Lumbar spine (L1-L4):  BMD is 0.841 g/cm2, T-score is -1.9, and Z-score is -0.2.  Total hip:  BMD is 0.851 g/cm2, T-score is -0.7, and Z-score is 0.1.  Femoral neck: BMD is 0.742 g/cm2, T-score is -1.0, and Z-score is 0.1.     Comparison  Lumbar spine:  BMD 0.866 g/cm2 and T-score -1.6.  Total Hip:        BMD 0.888 g/cm2 and T-score " -0.4.    FRAX: 4.0% risk of a major osteoporotic fracture in the next 10 years.  0.5% risk of hip fracture in the next 10 years.     Impression:  1. Osteopenia; FRAX calculations do not support treatment as osteoporosis.  2. Compared with previous DXA, BMD at the lumbar spine has declined by 2.9%, and the BMD at the total hip has declined by 4.1%.      02/21/2019  Lumbar spine (L1-L4): BMD is 0.841 g/cm2, T-score is -1.9, and Z-score is -0.2.  Total hip: BMD is 0.851 g/cm2, T-score is -0.7, and Z-score is 0.1.  Femoral neck:BMD is 0.742 g/cm2, T-score is -1.0, and Z-score is 0.1.    Comparison  Lumbar spine: BMD 0.866 g/cm2 and T-score -1.6.  Total Hip: BMD 0.888 g/cm2 and T-score -0.4.    FRAX: 4.0% risk of a major osteoporotic fracture in the next 10 years. 0.5% risk of hip fracture in the next 10 years.     Impression: Osteopenia; FRAX calculations do not support treatment as osteoporosis.  Compared with previous DXA, BMD at the lumbar spine has declined by 2.9%, and the BMD at the total hip has declined by 4.1%.r spine.      3) Abnormal TSH  - TSH elevation noted on recent blood work.  No history of hyperthyroidism  - Sister with thyroid medication  - incidental finding noted on lab work 1/22, not on therapy at this time    Reviewed past surgical, medical, family, social history and updated as appropriate.    Review of Systems : See above     Objective:   BP (!) 165/101 (BP Location: Right arm, Patient Position: Sitting, BP Method: Large (Automatic))   Pulse 89   Temp 97.3 °F (36.3 °C) (Oral)   Resp 18   Wt 83.4 kg (183 lb 14.4 oz)   BMI 30.60 kg/m²     Body mass index is 30.6 kg/m².    Physical Exam  Vitals and nursing note reviewed.   Constitutional:       Appearance: She is well-developed.   HENT:      Head: Normocephalic.   Eyes:      General: No scleral icterus.     Conjunctiva/sclera: Conjunctivae normal.   Cardiovascular:      Rate and Rhythm: Normal rate.      Heart sounds: Normal heart sounds.    Pulmonary:      Effort: Pulmonary effort is normal. No respiratory distress.   Abdominal:      Palpations: Abdomen is soft.      Tenderness: There is no abdominal tenderness.   Musculoskeletal:         General: Normal range of motion.      Cervical back: Neck supple.   Skin:     General: Skin is warm.      Findings: No erythema.   Neurological:      Mental Status: She is alert.      Coordination: Coordination normal.   Psychiatric:         Behavior: Behavior normal.         Lab Review:   Lab Results   Component Value Date    HGBA1C 5.4 01/14/2022       Lab Results   Component Value Date    CHOL 213 (H) 01/14/2022    HDL 73 01/14/2022    LDLCALC 114.6 01/14/2022    TRIG 127 01/14/2022    CHOLHDL 34.3 01/14/2022       Lab Results   Component Value Date     (L) 01/14/2022    K 4.5 01/14/2022     01/14/2022    CO2 20 (L) 01/14/2022    GLU 84 01/14/2022    BUN 8 01/14/2022    CREATININE 0.7 01/14/2022    CALCIUM 10.2 01/14/2022    PROT 7.2 01/14/2022    ALBUMIN 3.6 01/14/2022    BILITOT 0.4 01/14/2022    ALKPHOS 156 (H) 01/14/2022    AST 21 01/14/2022    ALT 8 (L) 01/14/2022    ANIONGAP 11 01/14/2022    ESTGFRAFRICA >60.0 01/14/2022    EGFRNONAA >60.0 01/14/2022    TSH 4.316 (H) 01/14/2022       Assessment     1. Osteopenia of multiple sites     2. Hyperparathyroidism, primary     3. Essential (primary) hypertension     4. Other specified hypothyroidism  TSH    Thyroid Peroxidase Antibody    T4, Free        Plan     Hyperparathyroidism, primary  - Patient with primary hyperparathyroidism, with remote history of hypercalcemia, per chart review resolve with stopping of HCTZ   - Previously worked up in 2011 with negative NM scan  - Calcium lab work recently have all been normal,  off Chlorthalidone  - Dietary reviewed: not contributing  - DXA with osteopenia most recently 2/2021  - Secondary workup stable, stable TSH, normal vitamin-D  - Urine Ca/Cr ratio: 0.010 (due to thiazide?)  - recent diagnosis of kidney  stone right    Plan  - repeat lab work for this year, monitor vitamin-D, calcium, PTH  - previously had normal serum calcium, stable renal function, stable bone density, PTH level decreasing,  no indication for pursuing of parathyroidectomy  - long discussion with patient, will both in agreement to continue active monitoring with blood work  - otherwise can consider Sensipar if calcium and PTH are elevated  - Advised to avoid dehydration, excessive calcium supplementations and avoid medication like HCTZ/Lithium that can worsen hypercalcemia.  - Follow up with lab work and 6 months, follow up at that time    Osteopenia  - DXA in 2019 reviewed, no indication for treatment at that time  - Done on 2/2021: Bone density shows osteopenia  - Continue vitamin-D supplementation  - Advises weight-bearing exercise, walking    Essential (primary) hypertension  - Blood pressure is stable here and at home  - Continue amlodipine 5 mg daily  - Avoid Chlorthalidone, HCTZ      Subclinical hypothyroidism  - noted on lab work 1/22, elevation of TSH  - positive family history with sister  - will check TFTs, check antibodies  - pending level can consider to LT4 if needed      RTC in 6 months with lab work to continue monitor    Boris Robbins MD  Endocrinology- [facility]  6/17/2022      Disclaimer: This note has been generated using voice-recognition software. There may be typographical errors that have been missed during proof-reading.

## 2022-06-17 NOTE — ASSESSMENT & PLAN NOTE
- Blood pressure is stable here and at home  - Continue amlodipine 5 mg daily  - Avoid Chlorthalidone, HCTZ

## 2022-06-17 NOTE — PROGRESS NOTES
Routine Office Visit    Patient Name: Marsha Chun    : 1945  MRN: [id number]    Chief Complaint:  ER follow-up, knee pain    Subjective for this now.:  Marsha is a 77 y.o. female who presents today for:    1. ER follow-up, knee pain - patient who is known to me reports today for evaluation.  She is here for an emergency room follow-up.  Was diagnosed with pyelonephritis and was given antibiotics.  She has finished those and denies any dysuria, fevers, chills, or nausea, vomiting, or abdominal pain.  She does deal with overactive bladder which she has been on medicines for in the past, however she states she does not take anything for this.  She endorses significant chronic bilateral knee pain from arthritis.  She would like a handicap placard for this.  CT imaging from the ER resulted as follows:    Narrative & Impression  EXAMINATION:  CT ABDOMEN PELVIS WITHOUT CONTRAST     CLINICAL HISTORY:  RLQ abdominal pain (Age >= 14y);     TECHNIQUE:  Low dose axial images, sagittal and coronal reformations were obtained from the lung bases to the pubic symphysis.  Oral contrast was not administered.     COMPARISON:  None     FINDINGS:  Images of the lower thorax are remarkable for bilateral dependent atelectasis.  There is a mild pericardial effusion.  There is a moderate hiatal hernia.     The liver, spleen, pancreas, gallbladder and adrenal glands have a grossly unremarkable noncontrast appearance.  There is no biliary dilation or ascites.  The pancreatic duct is not dilated.  No significant abdominal lymphadenopathy.     The left kidney has a grossly unremarkable noncontrast appearance without hydronephrosis or nephrolithiasis.  The left ureter is unremarkable without calculi seen.  There is right perinephric and periureteral inflammation.  There is right nonobstructive nephrolithiasis.  No right hydronephrosis.  The right ureter is unable to be followed in its entirety to the urinary bladder.  No definite  calculi seen along its course.  The urinary bladder is unremarkable.  The uterus and adnexa are unremarkable.     There is a large amount of stool within the rectum without rectal wall thickening.  There are several scattered colonic diverticula noting some indistinctness about a few diverticula involving the descending colon..  The terminal ileum and appendix are grossly unremarkable.  The small bowel is unremarkable.  Several scattered shotty to mildly prominent periaortic, pericaval, and mesenteric lymph nodes are noted.  There is atherosclerotic calcification of the aorta and its branches.  No focal organized pelvic fluid collection.     Degenerative changes are noted of the pubic symphysis and spine.  There is osteopenia.     Impression:     1. Right perinephric and periureteral inflammation.  Finding is nonspecific and could reflect sequela of recently passed calculus or infection, correlation is advised.  There is right nonobstructive nephrolithiasis.  2. Colonic diverticulosis noting slight indistinctness about a few diverticula involving the descending colon.  This is suspected to be on the basis of motion artifact and adjacent perinephric inflammation however correlation with any current symptomatology to suggest early changes of acute diverticulitis recommended.  3. Mild pericardial effusion.  4. Please see above for additional findings.    She did have some constipation at the time and she states this has resolved.    Past Medical History  Past Medical History:   Diagnosis Date    History of TB skin testing     Hyperparathyroidism, unspecified     Hypertension     Osteopenia     Overweight(278.02)        Past Surgical History  Past Surgical History:   Procedure Laterality Date    COLONOSCOPY N/A 10/29/2019    Procedure: COLONOSCOPY;  Surgeon: Amy Alex MD;  Location: [facility];  Service: Endoscopy;  Laterality: N/A;  confirmed appt-sp    lasik and a rotator cuff surgery      SHOULDER  SURGERY         Family History  Family History   Problem Relation Age of Onset    Stroke Sister     Diabetes Neg Hx     Thyroid cancer Neg Hx     Osteoporosis Neg Hx        Social History  Social History     Socioeconomic History    Marital status:    Tobacco Use    Smoking status: Never Smoker    Smokeless tobacco: Never Used   Substance and Sexual Activity    Alcohol use: No     Alcohol/week: 0.0 standard drinks    Drug use: No       Current Medications  Current Outpatient Medications on File Prior to Visit   Medication Sig Dispense Refill    amLODIPine (NORVASC) 5 MG tablet Take 1 tablet (5 mg total) by mouth once daily. 90 tablet 3    chlorhexidine (PERIDEX) 0.12 % solution       cholecalciferol, vitamin D3, (VITAMIN D3) 125 mcg (5,000 unit) Tab Take 1 tablet (5,000 Units total) by mouth once daily. 30 tablet 11    fish oil-omega-3 fatty acids 300-1,000 mg capsule Take 2 g by mouth once daily.      ibuprofen (ADVIL,MOTRIN) 600 MG tablet Take 1 tablet (600 mg total) by mouth every 6 (six) hours as needed for Pain. 20 tablet 0    ibuprofen (ADVIL,MOTRIN) 800 MG tablet Take 1 tablet (800 mg total) by mouth every 8 (eight) hours as needed for Pain. 30 tablet 0    UNABLE TO FIND medication name: Omega XL       No current facility-administered medications on file prior to visit.       Allergies   Review of patient's allergies indicates:  No Known Allergies    Review of Systems (Pertinent positives)  Review of Systems   Constitutional: Negative for chills, diaphoresis, fever, malaise/fatigue and weight loss.   HENT: Negative.    Eyes: Negative.    Respiratory: Negative.    Cardiovascular: Negative.    Gastrointestinal: Negative for abdominal pain, diarrhea, heartburn, nausea and vomiting.   Genitourinary: Positive for urgency. Negative for dysuria, flank pain, frequency and hematuria.   Musculoskeletal: Positive for joint pain. Negative for back pain, falls, myalgias and neck pain.   Skin:  "Negative.    Neurological: Negative.    Endo/Heme/Allergies: Negative.    Psychiatric/Behavioral: Negative.        BP (!) 162/94 (BP Location: Left arm, Patient Position: Sitting, BP Method: Medium (Manual))   Pulse 62   Temp 98.2 °F (36.8 °C) (Oral)   Resp 18   Ht 5' 5" (1.651 m)   Wt 83.2 kg (183 lb 6.8 oz)   SpO2 97%   BMI 30.52 kg/m²     Physical Exam  Vitals reviewed.   Constitutional:       General: She is not in acute distress.     Appearance: Normal appearance. She is not ill-appearing, toxic-appearing or diaphoretic.   HENT:      Head: Normocephalic and atraumatic.   Cardiovascular:      Rate and Rhythm: Normal rate and regular rhythm.      Pulses: Normal pulses.      Heart sounds: Normal heart sounds.   Pulmonary:      Effort: Pulmonary effort is normal.      Breath sounds: Normal breath sounds.   Skin:     General: Skin is warm and dry.   Neurological:      Mental Status: She is alert and oriented to person, place, and time.   Psychiatric:         Mood and Affect: Mood normal.         Behavior: Behavior normal.          Assessment/Plan:  Marsha Chun is a 77 y.o. female who presents today for :    Diagnoses and all orders for this visit:    Bilateral primary osteoarthritis of knee  -     Ambulatory referral/consult to Orthopedics; Future    Referral to orthopedics placed.  Handicap placard filled out for patient.    Nephrolithiasis  -     Ambulatory referral/consult to Urology; Future    Urinary symptoms resolved.  Patient requesting referral to specialist for nephrolithiasis.    Essential (primary) hypertension    Patient has not taken her amlodipine yet.  Recommended patient to take her medicine when she gets home.  Nurse blood pressure check in 2 weeks.        This office note has been dictated.  This dictation has been generated using M-Modal Fluency Direct dictation; some phonetic errors may occur.   My collaborating physician is Dr. Jam Baez.     "

## 2022-06-17 NOTE — ASSESSMENT & PLAN NOTE
- DXA in 2019 reviewed, no indication for treatment at that time  - Done on 2/2021: Bone density shows osteopenia  - Continue vitamin-D supplementation  - Advises weight-bearing exercise, walking

## 2022-06-17 NOTE — ASSESSMENT & PLAN NOTE
- Patient with primary hyperparathyroidism, with remote history of hypercalcemia, per chart review resolve with stopping of HCTZ   - Previously worked up in 2011 with negative NM scan  - Calcium lab work recently have all been normal,  off Chlorthalidone  - Dietary reviewed: not contributing  - DXA with osteopenia most recently 2/2021  - Secondary workup stable, stable TSH, normal vitamin-D  - Urine Ca/Cr ratio: 0.010 (due to thiazide?)  - recent diagnosis of kidney stone right    Plan  - repeat lab work for this year, monitor vitamin-D, calcium, PTH  - previously had normal serum calcium, stable renal function, stable bone density, PTH level decreasing,  no indication for pursuing of parathyroidectomy  - long discussion with patient, will both in agreement to continue active monitoring with blood work  - otherwise can consider Sensipar if calcium and PTH are elevated  - Advised to avoid dehydration, excessive calcium supplementations and avoid medication like HCTZ/Lithium that can worsen hypercalcemia.  - Follow up with lab work and 6 months, follow up at that time

## 2022-06-20 DIAGNOSIS — M25.561 PAIN IN BOTH KNEES, UNSPECIFIED CHRONICITY: Primary | ICD-10-CM

## 2022-06-20 DIAGNOSIS — M25.562 PAIN IN BOTH KNEES, UNSPECIFIED CHRONICITY: Primary | ICD-10-CM

## 2022-06-23 ENCOUNTER — OFFICE VISIT (OUTPATIENT)
Dept: ORTHOPEDICS | Facility: CLINIC | Age: 77
End: 2022-06-23
Payer: MEDICARE

## 2022-06-23 VITALS
RESPIRATION RATE: 15 BRPM | OXYGEN SATURATION: 99 % | SYSTOLIC BLOOD PRESSURE: 132 MMHG | BODY MASS INDEX: 30.49 KG/M2 | HEIGHT: 65 IN | HEART RATE: 89 BPM | DIASTOLIC BLOOD PRESSURE: 85 MMHG | WEIGHT: 183 LBS

## 2022-06-23 DIAGNOSIS — M17.0 BILATERAL PRIMARY OSTEOARTHRITIS OF KNEE: ICD-10-CM

## 2022-06-23 PROCEDURE — 3079F PR MOST RECENT DIASTOLIC BLOOD PRESSURE 80-89 MM HG: ICD-10-PCS | Mod: CPTII,S$GLB,, | Performed by: ORTHOPAEDIC SURGERY

## 2022-06-23 PROCEDURE — 99999 PR PBB SHADOW E&M-EST. PATIENT-LVL IV: CPT | Mod: PBBFAC,,, | Performed by: ORTHOPAEDIC SURGERY

## 2022-06-23 PROCEDURE — 3075F PR MOST RECENT SYSTOLIC BLOOD PRESS GE 130-139MM HG: ICD-10-PCS | Mod: CPTII,S$GLB,, | Performed by: ORTHOPAEDIC SURGERY

## 2022-06-23 PROCEDURE — 1159F MED LIST DOCD IN RCRD: CPT | Mod: CPTII,S$GLB,, | Performed by: ORTHOPAEDIC SURGERY

## 2022-06-23 PROCEDURE — 3075F SYST BP GE 130 - 139MM HG: CPT | Mod: CPTII,S$GLB,, | Performed by: ORTHOPAEDIC SURGERY

## 2022-06-23 PROCEDURE — 1125F AMNT PAIN NOTED PAIN PRSNT: CPT | Mod: CPTII,S$GLB,, | Performed by: ORTHOPAEDIC SURGERY

## 2022-06-23 PROCEDURE — 1159F PR MEDICATION LIST DOCUMENTED IN MEDICAL RECORD: ICD-10-PCS | Mod: CPTII,S$GLB,, | Performed by: ORTHOPAEDIC SURGERY

## 2022-06-23 PROCEDURE — 3044F HG A1C LEVEL LT 7.0%: CPT | Mod: CPTII,S$GLB,, | Performed by: ORTHOPAEDIC SURGERY

## 2022-06-23 PROCEDURE — 99213 OFFICE O/P EST LOW 20 MIN: CPT | Mod: S$GLB,,, | Performed by: ORTHOPAEDIC SURGERY

## 2022-06-23 PROCEDURE — 1125F PR PAIN SEVERITY QUANTIFIED, PAIN PRESENT: ICD-10-PCS | Mod: CPTII,S$GLB,, | Performed by: ORTHOPAEDIC SURGERY

## 2022-06-23 PROCEDURE — 3044F PR MOST RECENT HEMOGLOBIN A1C LEVEL <7.0%: ICD-10-PCS | Mod: CPTII,S$GLB,, | Performed by: ORTHOPAEDIC SURGERY

## 2022-06-23 PROCEDURE — 99999 PR PBB SHADOW E&M-EST. PATIENT-LVL IV: ICD-10-PCS | Mod: PBBFAC,,, | Performed by: ORTHOPAEDIC SURGERY

## 2022-06-23 PROCEDURE — 3079F DIAST BP 80-89 MM HG: CPT | Mod: CPTII,S$GLB,, | Performed by: ORTHOPAEDIC SURGERY

## 2022-06-23 PROCEDURE — 99213 PR OFFICE/OUTPT VISIT, EST, LEVL III, 20-29 MIN: ICD-10-PCS | Mod: S$GLB,,, | Performed by: ORTHOPAEDIC SURGERY

## 2022-06-23 NOTE — PROGRESS NOTES
NEW PATIENT ORTHOPAEDIC: Knee    PRIMARY CARE PHYSICIAN: Marcos Sevilla MD   REFERRING PROVIDER: Spenser Seaman, JULIO CESAR  [address] [place] [address]     ASSESSMENT & PLAN:    Impression:  Bilateral Knee Moderate Osteoarthritis, Primary      Follow Up Plan: PRN     Non operative care:    Marsha Chun has physical exam evidence of above and wishes to pursue an non-operative care. I am recommending the following: activity modification and HEP.  Patient has a long history of bilateral knee pain left worse than right.  The pains primarily localized over the lateral aspect of her knee.  She she has great benefits with formal physical therapy in the past.  She takes as needed anti-inflammatories.  She has a cane for ambulation.  Patient has relatively high functioning with regard to her pain.  She is not limited significantly aside from stairs and long distance walking.  She is not interested in injections or long-term oral medicines.  I advised that based on her PA flexion view which has bone-on-bone arthritis of her lateral compartment she could consider surgical intervention.  However I have reservations about meeting her expectations for the knee replacement based on her current level of pain and the pain relief that she would get after surgery.  I feel like there is a relatively long recovery process in may not see full benefit at this point I expect she without ever having previously tried injections.  Ultimately I discussed home exercise program to work on her quadriceps strength and see if she gets improvement in her pains long-term by using this modality.  If she fails to have long term relief in these knees continue to progress in terms of the pain she can come back and see me for consideration of surgery versus injections at that time.    The patient has been ordered:  None    CONSULTS:   None    ACTIVE PROBLEM LIST  Patient Active Problem List   Diagnosis    Essential (primary) hypertension     History of transient ischemic attack (TIA)    Hyperparathyroidism, primary    Osteopenia    Closed nondisplaced fracture of base of fifth metacarpal bone of right hand with routine healing    Sprain of left wrist    Sprain of anterior talofibular ligament of left ankle    Joint stiffness    Joint pain    OAB (overactive bladder)    Breast cancer screening    Mixed hyperlipidemia    Obesity (BMI 30.0-34.9)    Acute pain of left knee    Left leg pain    Numerous moles    Dislocation of left wrist    Diverticulosis    Osteoarthritis of left knee    Decreased range of motion (ROM) of left knee    Difficulty walking           SUBJECTIVE    CHIEF COMPLAINT: Knee Pain    HPI:   Marsha Chun is a 77 y.o. female here for evaluation and management of bilateral knee pain. There is not a specific incident that brought about this pain. she has had progressive problems with the knee(s) starting 2 years ago but is now progressing to interfere with activities which include: walking, functional household ADL's and climbing stairs     Currently the pain in the joint is rated at mild with activity. The pain is intermittent and is located in the knee, at level of joint line and located laterally. The pain is described as aching, stiffness and throbbing. Relieving factors include ambulatory device, rest and over the counter medication .       Marsha Chun has no additional complaints.     PROGRESSIVE SYMPTOMS:  Pain worsened by weight bearing  Pain effecting living situation  Pain limiting ability to stay fit and healthy    FUNCTIONAL STATUS:   Climb a flight of stairs or walk up a hill     PREVIOUS TREATMENTS:  Medical: RX NSAIDS  Physical Therapy: Activities Modified  and Formal Physical Therapy for 6 weeks  Previous Orthopaedic Surgery: Rotator Cuff     REVIEW OF SYSTEMS:  PAIN ASSESSMENT:  See HPI.  MUSCULOSKELETAL: See HPI.  OTHER 10 point review of systems is negative except as stated in HPI  "above    PAST MEDICAL HISTORY   has a past medical history of History of TB skin testing, Hyperparathyroidism, unspecified, Hypertension, Osteopenia, and Overweight(278.02).     PAST SURGICAL HISTORY   has a past surgical history that includes lasik and a rotator cuff surgery; Shoulder surgery; and Colonoscopy (N/A, 10/29/2019).     FAMILY HISTORY  family history includes Stroke in her sister.     SOCIAL HISTORY   reports that she has never smoked. She has never used smokeless tobacco. She reports that she does not drink alcohol and does not use drugs.     ALLERGIES   Review of patient's allergies indicates:  No Known Allergies     MEDICATIONS  Current Outpatient Medications on File Prior to Visit   Medication Sig Dispense Refill    amLODIPine (NORVASC) 5 MG tablet Take 1 tablet (5 mg total) by mouth once daily. 90 tablet 3    chlorhexidine (PERIDEX) 0.12 % solution       cholecalciferol, vitamin D3, (VITAMIN D3) 125 mcg (5,000 unit) Tab Take 1 tablet (5,000 Units total) by mouth once daily. 30 tablet 11    fish oil-omega-3 fatty acids 300-1,000 mg capsule Take 2 g by mouth once daily.      ibuprofen (ADVIL,MOTRIN) 600 MG tablet Take 1 tablet (600 mg total) by mouth every 6 (six) hours as needed for Pain. 20 tablet 0    ibuprofen (ADVIL,MOTRIN) 800 MG tablet Take 1 tablet (800 mg total) by mouth every 8 (eight) hours as needed for Pain. 30 tablet 0    UNABLE TO FIND medication name: Omega XL       No current facility-administered medications on file prior to visit.          PHYSICAL EXAM   height is 5' 5" (1.651 m) and weight is 83 kg (183 lb). Her blood pressure is 132/85 and her pulse is 89. Her respiration is 15 and oxygen saturation is 99%.   Body mass index is 30.45 kg/m².      All other systems deferred.  GENERAL:  No acute distress  HABITUS: Normal  GAIT: Antalgic  SKIN: Normal     KNEE EXAM:    bilateral:   Effusion: Minimal joint effusion  TTP: yes over Lateral Joint Line   no crepitus with passive " knee ROM  Passive ROM: Extension 0, Flexion 130  No pain with manipulation of patella  Stable to varus/valgus stress. No increased laxity to anterior/posterior drawer testing  negative Sandor's test  No pain with IR/ER rotation of the hip  5/5 strength in knee flexion and extension, ankle plantarflexion and dorsiflexion  Neurovascular Status: Sensation intact to light touch in Sural, Saphenous, SPN, DPN, Tibial nerve distribution  2+ pulse DP/PT, normal capillary refill, foot has normal warmth    DATA:  Diagnostic tests reviewed for today's visit:     4v of the knee reveal Moderate degenerative changes of the Lateral compartment. There is evidence of advanced osteoarthritis changes with Subchondral sclerosis, Osteophyte formation and Joint space narrowing. The limb is in netural alignment. The patella is tracking midline.

## 2022-06-27 ENCOUNTER — TELEPHONE (OUTPATIENT)
Dept: ENDOCRINOLOGY | Facility: CLINIC | Age: 77
End: 2022-06-27
Payer: MEDICARE

## 2022-06-27 NOTE — TELEPHONE ENCOUNTER
----- Message from Boris Robbins MD sent at 6/27/2022  8:30 AM CDT -----  Please call the patient  to let her know that lab work are stable. No issue with her calcium, no thyroid issue. Follow up with me every 6 months to monitor, thanks!

## 2022-07-01 ENCOUNTER — CLINICAL SUPPORT (OUTPATIENT)
Dept: FAMILY MEDICINE | Facility: CLINIC | Age: 77
End: 2022-07-01
Payer: MEDICARE

## 2022-07-01 VITALS — DIASTOLIC BLOOD PRESSURE: 80 MMHG | HEART RATE: 74 BPM | SYSTOLIC BLOOD PRESSURE: 125 MMHG

## 2022-07-01 DIAGNOSIS — I10 ESSENTIAL (PRIMARY) HYPERTENSION: Primary | ICD-10-CM

## 2022-07-01 PROCEDURE — 99499 NO LOS: ICD-10-PCS | Mod: S$GLB,,, | Performed by: INTERNAL MEDICINE

## 2022-07-01 PROCEDURE — 99999 PR PBB SHADOW E&M-EST. PATIENT-LVL I: CPT | Mod: PBBFAC,,,

## 2022-07-01 PROCEDURE — 99999 PR PBB SHADOW E&M-EST. PATIENT-LVL I: ICD-10-PCS | Mod: PBBFAC,,,

## 2022-07-01 PROCEDURE — 99499 UNLISTED E&M SERVICE: CPT | Mod: S$GLB,,, | Performed by: INTERNAL MEDICINE

## 2022-07-01 NOTE — PROGRESS NOTES
.  Marsha Chun 77 y.o. female is here today for Blood Pressure check.   History of HTN yes.    Review of patient's allergies indicates:  No Known Allergies  Creatinine   Date Value Ref Range Status   06/17/2022 1.2 0.5 - 1.4 mg/dL Final     Sodium   Date Value Ref Range Status   06/17/2022 140 136 - 145 mmol/L Final     Potassium   Date Value Ref Range Status   06/17/2022 3.9 3.5 - 5.1 mmol/L Final   ]  Patient verifies taking blood pressure medications on a regular basis at the same time of the day.     Current Outpatient Medications:     amLODIPine (NORVASC) 5 MG tablet, Take 1 tablet (5 mg total) by mouth once daily., Disp: 90 tablet, Rfl: 3    chlorhexidine (PERIDEX) 0.12 % solution, , Disp: , Rfl:     cholecalciferol, vitamin D3, (VITAMIN D3) 125 mcg (5,000 unit) Tab, Take 1 tablet (5,000 Units total) by mouth once daily., Disp: 30 tablet, Rfl: 11    fish oil-omega-3 fatty acids 300-1,000 mg capsule, Take 2 g by mouth once daily., Disp: , Rfl:     ibuprofen (ADVIL,MOTRIN) 600 MG tablet, Take 1 tablet (600 mg total) by mouth every 6 (six) hours as needed for Pain., Disp: 20 tablet, Rfl: 0    ibuprofen (ADVIL,MOTRIN) 800 MG tablet, Take 1 tablet (800 mg total) by mouth every 8 (eight) hours as needed for Pain., Disp: 30 tablet, Rfl: 0    UNABLE TO FIND, medication name: Omega XL, Disp: , Rfl:   Does patient have record of home blood pressure readings yes. Readings have been averaging 120s-160s/80s.   Last dose of blood pressure medication was taken at 07/01/2022 0900.  Patient is asymptomatic.   Complains of n/a.    BP: 125/80 , Pulse: 74 .      Dr. Sevilla notified.

## 2022-07-06 ENCOUNTER — OFFICE VISIT (OUTPATIENT)
Dept: UROLOGY | Facility: CLINIC | Age: 77
End: 2022-07-06
Payer: MEDICARE

## 2022-07-06 VITALS — BODY MASS INDEX: 30.23 KG/M2 | WEIGHT: 181.69 LBS

## 2022-07-06 DIAGNOSIS — N20.0 NEPHROLITHIASIS: ICD-10-CM

## 2022-07-06 DIAGNOSIS — Z87.448 HISTORY OF PYELONEPHRITIS: Primary | ICD-10-CM

## 2022-07-06 PROCEDURE — 1101F PR PT FALLS ASSESS DOC 0-1 FALLS W/OUT INJ PAST YR: ICD-10-PCS | Mod: CPTII,S$GLB,, | Performed by: STUDENT IN AN ORGANIZED HEALTH CARE EDUCATION/TRAINING PROGRAM

## 2022-07-06 PROCEDURE — 81001 URINALYSIS AUTO W/SCOPE: CPT | Mod: S$GLB,,, | Performed by: STUDENT IN AN ORGANIZED HEALTH CARE EDUCATION/TRAINING PROGRAM

## 2022-07-06 PROCEDURE — 1160F RVW MEDS BY RX/DR IN RCRD: CPT | Mod: CPTII,S$GLB,, | Performed by: STUDENT IN AN ORGANIZED HEALTH CARE EDUCATION/TRAINING PROGRAM

## 2022-07-06 PROCEDURE — 1126F AMNT PAIN NOTED NONE PRSNT: CPT | Mod: CPTII,S$GLB,, | Performed by: STUDENT IN AN ORGANIZED HEALTH CARE EDUCATION/TRAINING PROGRAM

## 2022-07-06 PROCEDURE — 99999 PR PBB SHADOW E&M-EST. PATIENT-LVL IV: CPT | Mod: PBBFAC,,, | Performed by: STUDENT IN AN ORGANIZED HEALTH CARE EDUCATION/TRAINING PROGRAM

## 2022-07-06 PROCEDURE — 3288F PR FALLS RISK ASSESSMENT DOCUMENTED: ICD-10-PCS | Mod: CPTII,S$GLB,, | Performed by: STUDENT IN AN ORGANIZED HEALTH CARE EDUCATION/TRAINING PROGRAM

## 2022-07-06 PROCEDURE — 1160F PR REVIEW ALL MEDS BY PRESCRIBER/CLIN PHARMACIST DOCUMENTED: ICD-10-PCS | Mod: CPTII,S$GLB,, | Performed by: STUDENT IN AN ORGANIZED HEALTH CARE EDUCATION/TRAINING PROGRAM

## 2022-07-06 PROCEDURE — 1159F PR MEDICATION LIST DOCUMENTED IN MEDICAL RECORD: ICD-10-PCS | Mod: CPTII,S$GLB,, | Performed by: STUDENT IN AN ORGANIZED HEALTH CARE EDUCATION/TRAINING PROGRAM

## 2022-07-06 PROCEDURE — 3288F FALL RISK ASSESSMENT DOCD: CPT | Mod: CPTII,S$GLB,, | Performed by: STUDENT IN AN ORGANIZED HEALTH CARE EDUCATION/TRAINING PROGRAM

## 2022-07-06 PROCEDURE — 81001 POCT URINALYSIS, DIPSTICK OR TABLET REAGENT, AUTOMATED, WITH MICROSCOP: ICD-10-PCS | Mod: S$GLB,,, | Performed by: STUDENT IN AN ORGANIZED HEALTH CARE EDUCATION/TRAINING PROGRAM

## 2022-07-06 PROCEDURE — 1101F PT FALLS ASSESS-DOCD LE1/YR: CPT | Mod: CPTII,S$GLB,, | Performed by: STUDENT IN AN ORGANIZED HEALTH CARE EDUCATION/TRAINING PROGRAM

## 2022-07-06 PROCEDURE — 3044F HG A1C LEVEL LT 7.0%: CPT | Mod: CPTII,S$GLB,, | Performed by: STUDENT IN AN ORGANIZED HEALTH CARE EDUCATION/TRAINING PROGRAM

## 2022-07-06 PROCEDURE — 99999 PR PBB SHADOW E&M-EST. PATIENT-LVL IV: ICD-10-PCS | Mod: PBBFAC,,, | Performed by: STUDENT IN AN ORGANIZED HEALTH CARE EDUCATION/TRAINING PROGRAM

## 2022-07-06 PROCEDURE — 3044F PR MOST RECENT HEMOGLOBIN A1C LEVEL <7.0%: ICD-10-PCS | Mod: CPTII,S$GLB,, | Performed by: STUDENT IN AN ORGANIZED HEALTH CARE EDUCATION/TRAINING PROGRAM

## 2022-07-06 PROCEDURE — 1126F PR PAIN SEVERITY QUANTIFIED, NO PAIN PRESENT: ICD-10-PCS | Mod: CPTII,S$GLB,, | Performed by: STUDENT IN AN ORGANIZED HEALTH CARE EDUCATION/TRAINING PROGRAM

## 2022-07-06 PROCEDURE — 99203 OFFICE O/P NEW LOW 30 MIN: CPT | Mod: S$GLB,,, | Performed by: STUDENT IN AN ORGANIZED HEALTH CARE EDUCATION/TRAINING PROGRAM

## 2022-07-06 PROCEDURE — 99203 PR OFFICE/OUTPT VISIT, NEW, LEVL III, 30-44 MIN: ICD-10-PCS | Mod: S$GLB,,, | Performed by: STUDENT IN AN ORGANIZED HEALTH CARE EDUCATION/TRAINING PROGRAM

## 2022-07-06 PROCEDURE — 1159F MED LIST DOCD IN RCRD: CPT | Mod: CPTII,S$GLB,, | Performed by: STUDENT IN AN ORGANIZED HEALTH CARE EDUCATION/TRAINING PROGRAM

## 2022-07-06 NOTE — PROGRESS NOTES
Patient ID: Marsha Chun is a 77 y.o. female.    Chief Complaint:  Abdominal pain    Referral: Spenser Seaman, NP  [address] [place] [address]     [facility]  77 y.o. who presents to the Urology clinic for evaluation of possible nephrolithiasis. Patient presented to the ED on 5/25/22 with concern for abdominal pain and back pain. CT revealed perinephric stranding without obstructing stone. She reported fever, there were WBC in her urine, but no urine culture was obtained. Patient was diagnosed with pyelonephritis and sent home with antibiotics. To date patient feels much better after antibiotics. Denies dysuria, flank pain, fevers, chills. Patient drinks 4 bottles of water daily, has daily firm BMs. Patient takes vit C. Denies FH of stones. Denies prior knowledge of stones, no prior imaging with stones.         Medically Necessary ROS documented in HPI    Review of Systems   Constitutional: Negative for chills and fever.   HENT: Negative for congestion and sore throat.    Eyes: Negative for blurred vision and redness.   Respiratory: Negative for cough and shortness of breath.    Cardiovascular: Negative for chest pain and leg swelling.   Gastrointestinal: Positive for constipation. Negative for abdominal pain, blood in stool, nausea and vomiting.   Genitourinary: Negative for dysuria, flank pain, frequency, hematuria and urgency.   Musculoskeletal: Positive for back pain.   Skin: Negative for rash.   Neurological: Negative for dizziness and headaches.   Psychiatric/Behavioral: Negative for depression. The patient is not nervous/anxious.          Past Medical History  Active Ambulatory Problems     Diagnosis Date Noted    Essential (primary) hypertension 11/16/2012    History of transient ischemic attack (TIA) 11/16/2012    Hyperparathyroidism, primary 11/16/2012    Osteopenia     Closed nondisplaced fracture of base of fifth metacarpal bone of right hand with routine healing 03/28/2018     Sprain of left wrist 04/19/2018    Sprain of anterior talofibular ligament of left ankle 04/19/2018    Joint stiffness 04/19/2018    Joint pain 04/19/2018    OAB (overactive bladder) 09/10/2018    Breast cancer screening 09/10/2018    Mixed hyperlipidemia 01/21/2019    Obesity (BMI 30.0-34.9) 01/21/2019    Acute pain of left knee 09/30/2019    Left leg pain 09/30/2019    Numerous moles 09/30/2019    Dislocation of left wrist 10/22/2019    Diverticulosis 10/29/2019    Osteoarthritis of left knee 04/28/2020    Decreased range of motion (ROM) of left knee 11/25/2020    Difficulty walking 11/25/2020     Resolved Ambulatory Problems     Diagnosis Date Noted    Hyperparathyroidism, unspecified     Annual physical exam 01/13/2017    Weakness 04/19/2018    Screen for STD (sexually transmitted disease) 09/10/2018    Healthcare maintenance 01/21/2019    Healthcare maintenance 09/30/2019    Colon cancer screening 10/29/2019     Past Medical History:   Diagnosis Date    History of TB skin testing     Hypertension     Overweight(278.02)          Past Surgical History  Past Surgical History:   Procedure Laterality Date    COLONOSCOPY N/A 10/29/2019    Procedure: COLONOSCOPY;  Surgeon: Amy Alex MD;  Location: [facility];  Service: Endoscopy;  Laterality: N/A;  confirmed appt-sp    lasik and a rotator cuff surgery      SHOULDER SURGERY         Social History  Social Connections: Not on file       Medications    Current Outpatient Medications:     amLODIPine (NORVASC) 5 MG tablet, Take 1 tablet (5 mg total) by mouth once daily., Disp: 90 tablet, Rfl: 3    chlorhexidine (PERIDEX) 0.12 % solution, , Disp: , Rfl:     cholecalciferol, vitamin D3, (VITAMIN D3) 125 mcg (5,000 unit) Tab, Take 1 tablet (5,000 Units total) by mouth once daily., Disp: 30 tablet, Rfl: 11    fish oil-omega-3 fatty acids 300-1,000 mg capsule, Take 2 g by mouth once daily., Disp: , Rfl:     ibuprofen (ADVIL,MOTRIN) 600 MG  tablet, Take 1 tablet (600 mg total) by mouth every 6 (six) hours as needed for Pain., Disp: 20 tablet, Rfl: 0    ibuprofen (ADVIL,MOTRIN) 800 MG tablet, Take 1 tablet (800 mg total) by mouth every 8 (eight) hours as needed for Pain., Disp: 30 tablet, Rfl: 0    UNABLE TO FIND, medication name: Omega XL, Disp: , Rfl:     Allergies  Review of patient's allergies indicates:  No Known Allergies    Patient's PMH, FH, Social hx, Medications, allergies reviewed and updated as pertinent to today's visit    Objective:      Physical Exam  Constitutional:       Appearance: She is well-developed.   HENT:      Head: Normocephalic and atraumatic.   Eyes:      Conjunctiva/sclera: Conjunctivae normal.   Pulmonary:      Effort: Pulmonary effort is normal. No respiratory distress.   Abdominal:      General: Abdomen is flat. There is no distension.      Palpations: Abdomen is soft.      Tenderness: There is no abdominal tenderness. There is no right CVA tenderness or left CVA tenderness.   Skin:     General: Skin is warm.      Findings: No rash.   Neurological:      Mental Status: She is alert and oriented to person, place, and time.   Psychiatric:         Behavior: Behavior normal.         EXAMINATION:  CT ABDOMEN PELVIS WITHOUT CONTRAST     CLINICAL HISTORY:  RLQ abdominal pain (Age >= 14y);     TECHNIQUE:  Low dose axial images, sagittal and coronal reformations were obtained from the lung bases to the pubic symphysis.  Oral contrast was not administered.     COMPARISON:  None     FINDINGS:  Images of the lower thorax are remarkable for bilateral dependent atelectasis.  There is a mild pericardial effusion.  There is a moderate hiatal hernia.     The liver, spleen, pancreas, gallbladder and adrenal glands have a grossly unremarkable noncontrast appearance.  There is no biliary dilation or ascites.  The pancreatic duct is not dilated.  No significant abdominal lymphadenopathy.     The left kidney has a grossly unremarkable  noncontrast appearance without hydronephrosis or nephrolithiasis.  The left ureter is unremarkable without calculi seen.  There is right perinephric and periureteral inflammation.  There is right nonobstructive nephrolithiasis.  No right hydronephrosis.  The right ureter is unable to be followed in its entirety to the urinary bladder.  No definite calculi seen along its course.  The urinary bladder is unremarkable.  The uterus and adnexa are unremarkable.     There is a large amount of stool within the rectum without rectal wall thickening.  There are several scattered colonic diverticula noting some indistinctness about a few diverticula involving the descending colon..  The terminal ileum and appendix are grossly unremarkable.  The small bowel is unremarkable.  Several scattered shotty to mildly prominent periaortic, pericaval, and mesenteric lymph nodes are noted.  There is atherosclerotic calcification of the aorta and its branches.  No focal organized pelvic fluid collection.     Degenerative changes are noted of the pubic symphysis and spine.  There is osteopenia.     Impression:     1. Right perinephric and periureteral inflammation.  Finding is nonspecific and could reflect sequela of recently passed calculus or infection, correlation is advised.  There is right nonobstructive nephrolithiasis.  2. Colonic diverticulosis noting slight indistinctness about a few diverticula involving the descending colon.  This is suspected to be on the basis of motion artifact and adjacent perinephric inflammation however correlation with any current symptomatology to suggest early changes of acute diverticulitis recommended.  3. Mild pericardial effusion.  4. Please see above for additional findings.        Electronically signed by: Bernardo Reese MD  Date:                                            05/25/2022  Time:                                           17:04             Exam Ended: 05/25/22 16:54 Last Resulted:  05/25/22 17:04                 Assessment:       1. History of pyelonephritis    2. Nephrolithiasis        Plan:         No significant stone in either kidney when CT from 5/25/22 reviewed personally    Large amount of stool on imaging, constipation is a risk factor for urinary infections and abdominal pain      No prior imaging to confirm patient had a stone, imaging findings most consistent with pyelonephritis    POCT UA w/o signs of infection    Discussed with patient who verbalized understanding      RTC 1 year with KP/KUB

## 2022-07-07 LAB
BILIRUB SERPL-MCNC: NEGATIVE MG/DL
BLOOD URINE, POC: NEGATIVE
COLOR, POC UA: NORMAL
GLUCOSE UR QL STRIP: NORMAL
KETONES UR QL STRIP: NEGATIVE
LEUKOCYTE ESTERASE URINE, POC: NEGATIVE
NITRITE, POC UA: NEGATIVE
PH, POC UA: 7
PROTEIN, POC: NEGATIVE
SPECIFIC GRAVITY, POC UA: 1005
UROBILINOGEN, POC UA: NORMAL

## 2022-07-21 ENCOUNTER — IMMUNIZATION (OUTPATIENT)
Dept: OBSTETRICS AND GYNECOLOGY | Facility: CLINIC | Age: 77
End: 2022-07-21
Payer: MEDICARE

## 2022-07-21 DIAGNOSIS — Z23 NEED FOR VACCINATION: Primary | ICD-10-CM

## 2022-07-21 PROCEDURE — 0054A COVID-19, MRNA, LNP-S, PF, 30 MCG/0.3 ML DOSE VACCINE (PFIZER): CPT | Mod: PBBFAC | Performed by: FAMILY MEDICINE

## 2022-07-26 ENCOUNTER — OFFICE VISIT (OUTPATIENT)
Dept: FAMILY MEDICINE | Facility: CLINIC | Age: 77
End: 2022-07-26
Payer: MEDICARE

## 2022-07-26 VITALS
WEIGHT: 182.13 LBS | RESPIRATION RATE: 19 BRPM | OXYGEN SATURATION: 98 % | DIASTOLIC BLOOD PRESSURE: 80 MMHG | HEART RATE: 86 BPM | SYSTOLIC BLOOD PRESSURE: 128 MMHG | BODY MASS INDEX: 30.3 KG/M2

## 2022-07-26 DIAGNOSIS — Z13.6 ENCOUNTER FOR LIPID SCREENING FOR CARDIOVASCULAR DISEASE: ICD-10-CM

## 2022-07-26 DIAGNOSIS — M17.0 BILATERAL PRIMARY OSTEOARTHRITIS OF KNEE: ICD-10-CM

## 2022-07-26 DIAGNOSIS — Z86.39 H/O: OBESITY: ICD-10-CM

## 2022-07-26 DIAGNOSIS — I10 ESSENTIAL (PRIMARY) HYPERTENSION: ICD-10-CM

## 2022-07-26 DIAGNOSIS — K57.90 DIVERTICULOSIS: ICD-10-CM

## 2022-07-26 DIAGNOSIS — E78.2 MIXED HYPERLIPIDEMIA: ICD-10-CM

## 2022-07-26 DIAGNOSIS — Z13.220 ENCOUNTER FOR LIPID SCREENING FOR CARDIOVASCULAR DISEASE: ICD-10-CM

## 2022-07-26 DIAGNOSIS — Z00.00 HEALTHCARE MAINTENANCE: Primary | ICD-10-CM

## 2022-07-26 DIAGNOSIS — Z23 INFLUENZA VACCINE NEEDED: ICD-10-CM

## 2022-07-26 DIAGNOSIS — E21.0 HYPERPARATHYROIDISM, PRIMARY: ICD-10-CM

## 2022-07-26 PROCEDURE — 1160F PR REVIEW ALL MEDS BY PRESCRIBER/CLIN PHARMACIST DOCUMENTED: ICD-10-PCS | Mod: CPTII,S$GLB,, | Performed by: INTERNAL MEDICINE

## 2022-07-26 PROCEDURE — 3074F PR MOST RECENT SYSTOLIC BLOOD PRESSURE < 130 MM HG: ICD-10-PCS | Mod: CPTII,S$GLB,, | Performed by: INTERNAL MEDICINE

## 2022-07-26 PROCEDURE — 99999 PR PBB SHADOW E&M-EST. PATIENT-LVL IV: ICD-10-PCS | Mod: PBBFAC,,, | Performed by: INTERNAL MEDICINE

## 2022-07-26 PROCEDURE — 3079F DIAST BP 80-89 MM HG: CPT | Mod: CPTII,S$GLB,, | Performed by: INTERNAL MEDICINE

## 2022-07-26 PROCEDURE — 3044F HG A1C LEVEL LT 7.0%: CPT | Mod: CPTII,S$GLB,, | Performed by: INTERNAL MEDICINE

## 2022-07-26 PROCEDURE — 1159F PR MEDICATION LIST DOCUMENTED IN MEDICAL RECORD: ICD-10-PCS | Mod: CPTII,S$GLB,, | Performed by: INTERNAL MEDICINE

## 2022-07-26 PROCEDURE — 1160F RVW MEDS BY RX/DR IN RCRD: CPT | Mod: CPTII,S$GLB,, | Performed by: INTERNAL MEDICINE

## 2022-07-26 PROCEDURE — 99214 PR OFFICE/OUTPT VISIT, EST, LEVL IV, 30-39 MIN: ICD-10-PCS | Mod: S$GLB,,, | Performed by: INTERNAL MEDICINE

## 2022-07-26 PROCEDURE — 3074F SYST BP LT 130 MM HG: CPT | Mod: CPTII,S$GLB,, | Performed by: INTERNAL MEDICINE

## 2022-07-26 PROCEDURE — 1159F MED LIST DOCD IN RCRD: CPT | Mod: CPTII,S$GLB,, | Performed by: INTERNAL MEDICINE

## 2022-07-26 PROCEDURE — 99999 PR PBB SHADOW E&M-EST. PATIENT-LVL IV: CPT | Mod: PBBFAC,,, | Performed by: INTERNAL MEDICINE

## 2022-07-26 PROCEDURE — 3079F PR MOST RECENT DIASTOLIC BLOOD PRESSURE 80-89 MM HG: ICD-10-PCS | Mod: CPTII,S$GLB,, | Performed by: INTERNAL MEDICINE

## 2022-07-26 PROCEDURE — 99214 OFFICE O/P EST MOD 30 MIN: CPT | Mod: S$GLB,,, | Performed by: INTERNAL MEDICINE

## 2022-07-26 PROCEDURE — 3044F PR MOST RECENT HEMOGLOBIN A1C LEVEL <7.0%: ICD-10-PCS | Mod: CPTII,S$GLB,, | Performed by: INTERNAL MEDICINE

## 2022-07-26 RX ORDER — IBUPROFEN 600 MG/1
600 TABLET ORAL EVERY 6 HOURS PRN
Qty: 20 TABLET | Refills: 0 | Status: SHIPPED | OUTPATIENT
Start: 2022-07-26 | End: 2022-11-23 | Stop reason: SDUPTHER

## 2022-07-26 RX ORDER — ACETAMINOPHEN 500 MG
1000 TABLET ORAL 2 TIMES DAILY PRN
Qty: 50 TABLET | Refills: 2 | Status: SHIPPED | OUTPATIENT
Start: 2022-07-26

## 2022-07-26 RX ORDER — IBUPROFEN 600 MG/1
600 TABLET ORAL EVERY 6 HOURS PRN
Qty: 20 TABLET | Refills: 0 | Status: SHIPPED | OUTPATIENT
Start: 2022-07-26 | End: 2022-07-26

## 2022-07-26 NOTE — PROGRESS NOTES
82-year-old male presenting ER after having a slip and fall landing on his left lateral chest/flank. Patient reports that it is his birthday and he was going out to eat at a nice restaurant so put on his nice leather soled shoes that are slippery. Went out to the garage and slipped falling onto his left side landing on the edge of a cooler. No head trauma. No loss of consciousness and no anticoagulation. Patient reports immediate pain and worsened pain with movement in deep inspiration. Patient tried to tough it out and went out to dinner but any movement worsens his symptoms. No dizziness lightheadedness. No blood in the urine. Patient took Motrin this evening and his wife gave him 1 Ativan to see if he would be able to sleep but this did not improve his symptoms. Patient complaining of left flank pain worsened to palpation and respiration but also has abdominal pain to palpation.            Past Medical History:   Diagnosis Date    Hypertension     Skin cancer     BCC face, neck       Past Surgical History:   Procedure Laterality Date    COLONOSCOPY N/A 8/27/2019    COLONOSCOPY performed by Jose Herrera MD at [address] HX MOHS PROCEDURES  04/10/2017    [address] right postauricular by Dr. Marina Child Bilateral     hip replacement    HX ORTHOPAEDIC Left     rotator cuff repair         Family History:   Problem Relation Age of Onset   Daniella [place] Cancer Sister         bladder    Cancer Brother         colon cancer    Cancer Other         breast cancer       Social History     Socioeconomic History    Marital status:      Spouse name: Not on file    Number of children: Not on file    Years of education: Not on file    Highest education level: Not on file   Occupational History    Not on file   Social Needs    Financial resource strain: Not on file    Food insecurity     Worry: Not on file     Inability: Not on file    Transportation needs     Medical: Not on file HISTORY OF PRESENT ILLNESS:  Marsha Chun is a 77 y.o. female who presents to the clinic today for 6 month follow up.    Last seen by me 12/2021.  Eligible for shingles vaccine but does not want to get it.    Treated for pyelonephritis and possible kidney stone from ED 5/25/22.  Seen by urology 7/6/22.  No symptoms today.    Hyperparathyroidism, Osteopenia  Active monitoring and follows with endocrinology.    HTN  Managed with amlodipine.  Home BP was 120/72.    Bilateral knee osteoarthritis  Seen by Ortho last month.  Currently being managed medically.  Takes ibuprofen as needed and using a cane occasionally to help stabilize her gait.    PAST MEDICAL HISTORY:  Past Medical History:   Diagnosis Date    History of TB skin testing     Hyperparathyroidism, unspecified     Hypertension     Osteopenia     Overweight(278.02)        PAST SURGICAL HISTORY:  Past Surgical History:   Procedure Laterality Date    COLONOSCOPY N/A 10/29/2019    Procedure: COLONOSCOPY;  Surgeon: Amy Alex MD;  Location: [facility];  Service: Endoscopy;  Laterality: N/A;  confirmed appt-sp    lasik and a rotator cuff surgery      SHOULDER SURGERY         SOCIAL HISTORY:  Social History     Socioeconomic History    Marital status:    Tobacco Use    Smoking status: Never Smoker    Smokeless tobacco: Never Used   Substance and Sexual Activity    Alcohol use: No     Alcohol/week: 0.0 standard drinks    Drug use: No       FAMILY HISTORY:  Family History   Problem Relation Age of Onset    Stroke Sister     Diabetes Neg Hx     Thyroid cancer Neg Hx     Osteoporosis Neg Hx        ALLERGIES AND MEDICATIONS: updated and reviewed.  Review of patient's allergies indicates:  No Known Allergies  Medication List with Changes/Refills   Current Medications    AMLODIPINE (NORVASC) 5 MG TABLET    Take 1 tablet (5 mg total) by mouth once daily.    CHLORHEXIDINE (PERIDEX) 0.12 % SOLUTION        CHOLECALCIFEROL, VITAMIN D3,  (VITAMIN D3) 125 MCG (5,000 UNIT) TAB    Take 1 tablet (5,000 Units total) by mouth once daily.    FISH OIL-OMEGA-3 FATTY ACIDS 300-1,000 MG CAPSULE    Take 2 g by mouth once daily.    IBUPROFEN (ADVIL,MOTRIN) 600 MG TABLET    Take 1 tablet (600 mg total) by mouth every 6 (six) hours as needed for Pain.    IBUPROFEN (ADVIL,MOTRIN) 800 MG TABLET    Take 1 tablet (800 mg total) by mouth every 8 (eight) hours as needed for Pain.    UNABLE TO FIND    medication name: Omega XL          CARE TEAM:  Patient Care Team:  Marcos Sevilla MD as PCP - General (Internal Medicine)  Nisa Dunaway MA as Care Coordinator         REVIEW OF SYSTEMS:  Review of Systems   Constitutional: Negative for chills and fever.   HENT: Negative for congestion and postnasal drip.    Eyes: Negative for photophobia and visual disturbance.   Respiratory: Negative for cough and shortness of breath.    Cardiovascular: Negative for chest pain and palpitations.   Gastrointestinal: Negative for nausea and vomiting.   Genitourinary: Negative for dysuria and frequency.   Musculoskeletal: Positive for arthralgias (both knees). Negative for joint swelling.   Neurological: Negative for light-headedness and headaches.   Psychiatric/Behavioral: Negative for dysphoric mood. The patient is not nervous/anxious.          PHYSICAL EXAM:   Vitals:    07/26/22 1152   BP: 128/80   Pulse:    Resp:              Body mass index is 30.3 kg/m².     General appearance - alert, well appearing, and in no distress and oriented to person, place, and time  Mental status - normal mood, behavior, speech, dress, motor activity, and thought processes  Eyes - sclera anicteric, left eye normal, right eye normal  Chest - clear to auscultation, no wheezes, rales or rhonchi, symmetric air entry  Heart - normal rate, regular rhythm, normal S1, S2, no murmurs, rubs, clicks or gallops  Neurological - alert, oriented, normal speech, no focal findings or movement disorder  Non-medical: Not on file   Tobacco Use    Smoking status: Former Smoker     Quit date: 1972     Years since quittin.7    Smokeless tobacco: Never Used   Substance and Sexual Activity    Alcohol use: No    Drug use: Not on file    Sexual activity: Not on file   Lifestyle    Physical activity     Days per week: Not on file     Minutes per session: Not on file    Stress: Not on file   Relationships    Social connections     Talks on phone: Not on file     Gets together: Not on file     Attends Quaker service: Not on file     Active member of club or organization: Not on file     Attends meetings of clubs or organizations: Not on file     Relationship status: Not on file    Intimate partner violence     Fear of current or ex partner: Not on file     Emotionally abused: Not on file     Physically abused: Not on file     Forced sexual activity: Not on file   Other Topics Concern    Not on file   Social History Narrative    Not on file         ALLERGIES: Patient has no known allergies. Review of Systems   Constitutional: Negative for chills and fever. HENT: Negative for congestion and sore throat. Eyes: Negative for pain. Respiratory: Negative for shortness of breath. Cardiovascular: Positive for chest pain. Gastrointestinal: Positive for abdominal pain. Negative for diarrhea, nausea and vomiting. Genitourinary: Negative for dysuria and flank pain. Musculoskeletal: Negative for back pain and neck pain. Skin: Negative for rash. Neurological: Negative for dizziness and headaches. Vitals:    21 2249   BP: (!) 141/78   Pulse: 68   Resp: 18   Temp: 96.8 °F (36 °C)   SpO2: 98%   Weight: 90.7 kg (200 lb)   Height: 5' 9\" (1.753 m)            Physical Exam  Constitutional:       Appearance: Normal appearance. HENT:      Head: Normocephalic and atraumatic. Nose: Nose normal.      Mouth/Throat:      Pharynx: Oropharynx is clear.    Eyes:      Extraocular Movements: Extraocular movements intact. Conjunctiva/sclera: Conjunctivae normal.      Pupils: Pupils are equal, round, and reactive to light. Neck:      Musculoskeletal: Normal range of motion and neck supple. No muscular tenderness. Cardiovascular:      Rate and Rhythm: Normal rate. Pulses: Normal pulses. Heart sounds: No murmur. Pulmonary:      Effort: Pulmonary effort is normal.   Chest:      Chest wall: Swelling and tenderness present. No deformity, crepitus or edema. Abdominal:      General: Abdomen is flat. Bowel sounds are normal.      Palpations: Abdomen is soft. Tenderness: There is abdominal tenderness. There is left CVA tenderness. There is no right CVA tenderness, guarding or rebound. Negative signs include Perkins's sign and McBurney's sign. Musculoskeletal: Normal range of motion. Comments: No midline back tenderness   Skin:     General: Skin is warm. Capillary Refill: Capillary refill takes less than 2 seconds. Neurological:      General: No focal deficit present. Mental Status: He is alert and oriented to person, place, and time. MDM  Number of Diagnoses or Management Options  Chest wall pain  Contusion of left chest wall, initial encounter  Flank pain  Diagnosis management comments: Patient who had a mechanical fall landing on his left lateral chest/flank having pain with deep inspiration as well as pain in the upper quadrant abdomen. Signs of contusion over the site no ecchymosis. No reported blood in the urine. No anticoagulations. Concern for possible rib fracture versus contusion as well as traumatic injury possibly to the spleen. CAT scan was performed with no significant findings found. Diagnosed with contusion. Discussed symptomatic treatment including icing, anti-inflammatories high-dose Tylenol and lidocaine patch    Discussed the discharge impression and any labs and the results with the patient.  Answered any questions and noted  Extremities - peripheral pulses normal, no pedal edema, no clubbing or cyanosis      ASSESSMENT AND PLAN:  Healthcare maintenance  -     Hemoglobin A1C; Future; Expected date: 01/16/2023  -     Comprehensive Metabolic Panel; Future; Expected date: 01/16/2023  -     Lipid Panel; Future; Expected date: 01/16/2023  -     CBC Auto Differential; Future; Expected date: 01/16/2023  -     TSH; Future; Expected date: 01/16/2023  -     Vitamin D; Future; Expected date: 01/16/2023    Bilateral primary osteoarthritis of knee  -     Discontinue: ibuprofen (ADVIL,MOTRIN) 600 MG tablet; Take 1 tablet (600 mg total) by mouth every 6 (six) hours as needed for Pain.  Dispense: 20 tablet; Refill: 0  -     acetaminophen (TYLENOL) 500 MG tablet; Take 2 tablets (1,000 mg total) by mouth 2 (two) times daily as needed for Pain.  Dispense: 50 tablet; Refill: 2  -     Ambulatory referral/consult to Physical/Occupational Therapy; Future; Expected date: 08/02/2022  -     ibuprofen (ADVIL,MOTRIN) 600 MG tablet; Take 1 tablet (600 mg total) by mouth every 6 (six) hours as needed for Pain.  Dispense: 20 tablet; Refill: 0    Essential (primary) hypertension        - Controlled on current regimen.  Will avoid HCTZ and chlorthalidone given h/o hypercalcemia.    Mixed hyperlipidemia        - Patient has declined taking statin medication and understands increased risk of cardiovascular disease.  She is taking omege-3 supplement.    The 10-year ASCVD risk score (Pilar ERIKA Jr., et al., 2013) is: 17.9%    Values used to calculate the score:      Age: 77 years      Sex: Female      Is Non- : Yes      Diabetic: No      Tobacco smoker: No      Systolic Blood Pressure: 128 mmHg      Is BP treated: Yes      HDL Cholesterol: 73 mg/dL      Total Cholesterol: 213 mg/dL    Encounter for lipid screening for cardiovascular disease  -     Lipid Panel; Future; Expected date: 01/16/2023    Hyperparathyroidism, primary  -     Vitamin D;  Future; Expected date: 01/16/2023    Diverticulosis        - Advised for high fiber diet and regular exercise.    Influenza vaccine needed  -     Influenza (FLUAD) - Quadrivalent (Adjuvanted) *Preferred* (65+) (PF); Future; Expected date: 10/03/2022    H/O: obesity   -     Hemoglobin A1C; Future; Expected date: 01/16/2023             Follow up 6 months or sooner as needed.     addressed any concerns. Discussed the importance of following up with their primary care provider and/or specialist.  Discussed signs or symptoms that would warrant return back to the ER for further evaluation. The patient is agreeable with discharge. Amount and/or Complexity of Data Reviewed  Tests in the radiology section of CPT®: reviewed      ED Course as of Mar 07 0102   Sat Mar 06, 2021   2140 EKG: Normal sinus rhythm rate of 82 beats minute with normal GA, QRS interval prolonged QT at 656 ms. No ST elevation or depressions. Q wave in lead III and aVF. EKG interpreted by Reta Rose MD      [ZD]      ED Course User Index  [ZD] Stiven Irizarry MD       Procedures    No results found for this or any previous visit (from the past 24 hour(s)). Ct Chest Wo Cont    Result Date: 3/7/2021  EXAM: CT ABD PELV WO CONT, CT CHEST WO CONT INDICATION: left flank pain, fall, trauma COMPARISON: CT chest 2007 CONTRAST:  None. TECHNIQUE: Thin axial images were obtained through the abdomen and pelvis. Coronal and sagittal reformats were generated. Oral contrast was not administered. CT dose reduction was achieved through use of a standardized protocol tailored for this examination and automatic exposure control for dose modulation. The absence of intravenous contrast material reduces the sensitivity for evaluation of the vasculature and solid organs. FINDINGS: LOWER THORAX: No significant abnormality in the incidentally imaged lower chest. LIVER: No mass. BILIARY TREE: Gallbladder is within normal limits. CBD is not dilated. SPLEEN: within normal limits. PANCREAS: No focal abnormality. ADRENALS: Unremarkable. KIDNEYS/URETERS: No calculus or hydronephrosis. STOMACH: Unremarkable. SMALL BOWEL: No dilatation or wall thickening. COLON: No dilatation or wall thickening. Sigmoid diverticulosis of. APPENDIX: Appears normal. PERITONEUM: No ascites or pneumoperitoneum.  RETROPERITONEUM: No lymphadenopathy or aortic aneurysm. URINARY BLADDER: No mass or calculus. BONES: No destructive bone lesion. Bilateral hip prostheses. ABDOMINAL WALL: No mass or hernia. ADDITIONAL COMMENTS: N/A     No acute findings. Ct Abd Pelv Wo Cont    Result Date: 3/7/2021  EXAM: CT ABD PELV WO CONT, CT CHEST WO CONT INDICATION: left flank pain, fall, trauma COMPARISON: CT chest 2007 CONTRAST:  None. TECHNIQUE: Thin axial images were obtained through the abdomen and pelvis. Coronal and sagittal reformats were generated. Oral contrast was not administered. CT dose reduction was achieved through use of a standardized protocol tailored for this examination and automatic exposure control for dose modulation. The absence of intravenous contrast material reduces the sensitivity for evaluation of the vasculature and solid organs. FINDINGS: LOWER THORAX: No significant abnormality in the incidentally imaged lower chest. LIVER: No mass. BILIARY TREE: Gallbladder is within normal limits. CBD is not dilated. SPLEEN: within normal limits. PANCREAS: No focal abnormality. ADRENALS: Unremarkable. KIDNEYS/URETERS: No calculus or hydronephrosis. STOMACH: Unremarkable. SMALL BOWEL: No dilatation or wall thickening. COLON: No dilatation or wall thickening. Sigmoid diverticulosis of. APPENDIX: Appears normal. PERITONEUM: No ascites or pneumoperitoneum. RETROPERITONEUM: No lymphadenopathy or aortic aneurysm. URINARY BLADDER: No mass or calculus. BONES: No destructive bone lesion. Bilateral hip prostheses. ABDOMINAL WALL: No mass or hernia. ADDITIONAL COMMENTS: N/A     No acute findings.

## 2022-08-16 ENCOUNTER — PES CALL (OUTPATIENT)
Dept: ADMINISTRATIVE | Facility: CLINIC | Age: 77
End: 2022-08-16
Payer: MEDICARE

## 2022-08-19 ENCOUNTER — CLINICAL SUPPORT (OUTPATIENT)
Dept: REHABILITATION | Facility: HOSPITAL | Age: 77
End: 2022-08-19
Attending: INTERNAL MEDICINE
Payer: MEDICARE

## 2022-08-19 DIAGNOSIS — M17.0 BILATERAL PRIMARY OSTEOARTHRITIS OF KNEE: ICD-10-CM

## 2022-08-19 DIAGNOSIS — M25.662 DECREASED RANGE OF MOTION (ROM) OF BOTH KNEES: ICD-10-CM

## 2022-08-19 DIAGNOSIS — M25.661 DECREASED RANGE OF MOTION (ROM) OF BOTH KNEES: ICD-10-CM

## 2022-08-19 DIAGNOSIS — Z74.09 IMPAIRED FUNCTIONAL MOBILITY AND ACTIVITY TOLERANCE: ICD-10-CM

## 2022-08-19 DIAGNOSIS — M62.81 QUADRICEPS WEAKNESS: ICD-10-CM

## 2022-08-19 PROCEDURE — 97110 THERAPEUTIC EXERCISES: CPT | Mod: PO

## 2022-08-19 PROCEDURE — 97161 PT EVAL LOW COMPLEX 20 MIN: CPT | Mod: PO

## 2022-08-19 NOTE — PLAN OF CARE
[facility]   Physical Therapy Initial Evaluation     Date: 8/19/2022   Name: Marsha Lopez [place]  Clinic Number: [id number]    Therapy Diagnosis:   Encounter Diagnoses   Name Primary?    Bilateral primary osteoarthritis of knee     Impaired functional mobility and activity tolerance     Decreased range of motion (ROM) of both knees     Quadriceps weakness      Physician: Marcos Sevilla MD    Physician Orders: PT Eval and Treat   Medical Diagnosis from Referral: M17.0 (ICD-10-CM) - Bilateral primary osteoarthritis of knee   Evaluation Date: 8/19/2022  Authorization Period Expiration: 08/30/2022   Plan of Care Expiration: 10/31/2022  Progress Note Due: 9/19/2022  Visit # / Visits authorized: 1/ 1   FOTO: 1/3    Precautions: Standard     Time In: 1221  Time Out: 100  Total Appointment Time (timed & untimed codes): 39 minutes      SUBJECTIVE     Date of onset: chronic    History of current condition - Marsha reports: bilateral knee pain (left>right) affecting her ability to walk and navigate stairs. She is taking ibuprofen a few times a day which helps.     Falls: no    Imaging,   XR Bilateral Knees: FINDINGS:  DJD with narrowing of the patellofemoral and the tibiofemoral joint spaces bilaterally.  It is more marked on the lateral side.  No acute fracture or dislocation.  No bone destruction identified        Prior Therapy: yes - prior to COVID  Social History: one story house with 4-5 steps to enter lives with their family  Occupation: retired   Prior Level of Function: modified independence using hurricane cane, limited with going down stairs, limited with walking  Current Level of Function: same as above      Pain:  Current 0/10, worst 9/10, best 0/10   Location: bilateral knee    Description: Aching and Tight  Aggravating Factors: Walking and Morning  Easing Factors:  Voltaren Gel and Ibuprofen     Patients goals: she would like to be able to walk >/= 30 minutes prior to onset of  knee pain, walk without an assistive device       Medical History:   Past Medical History:   Diagnosis Date    History of TB skin testing     Hyperparathyroidism, unspecified     Hypertension     Osteopenia     Overweight(278.02)        Surgical History:   Marsha Chun  has a past surgical history that includes lasik and a rotator cuff surgery; Shoulder surgery; and Colonoscopy (N/A, 10/29/2019).    Medications:   Marsha has a current medication list which includes the following prescription(s): acetaminophen, amlodipine, chlorhexidine, cholecalciferol (vitamin d3), fish oil-omega-3 fatty acids, ibuprofen, and UNABLE TO FIND.    Allergies:   Review of patient's allergies indicates:  No Known Allergies       OBJECTIVE     Observation:   -Difficulty rolling in bed  -poor left quadriceps contraction with extension lag during straight leg raise    Gait Analysis: The patient ambulated with the following assistive device: straight cane; the pt presents with the following gait abnormalities: decreased step length, agapito, and arm swing; increased knee flexion on right during midstance     Knee ROM: (measured in degrees):    Knee AROM PROM Comments   Right 5-130 3-131    Left 8-120 5-123        Lower Extremity Strength (graded 0-5 out of 5)    Right LE Left LE   Hip flexion: 4/5 4-/5   Knee extension:  5/5 5/5   Ankle dorsiflexion: 5/5 5/5   Posterior fibers of Gluteus Medius 2+/5 3-/5   Hip extension: Not Tested  Not Tested    Knee flexion:  5/5 5/5   Ankle plantarflexion: 5/5 5/5     Joint Mobility:  decreased superior and inferior glide    Flexibility: mild limitations in in hamstring and gastrocnemius       Limitation/Restriction for FOTO Knee Survey    Therapist reviewed FOTO scores for Marsha Chun on 8/19/2022.   FOTO documents entered into [employer] - see Media section.    Limitation Score: 51%  Goal: 39%         TREATMENT     Total Treatment time (time-based codes) separate from Evaluation: 15  "minutes      Marsha received the treatments listed below:      therapeutic exercises to develop strength, endurance and flexibility for 15 minutes including:  SLR: 10x  Quad sets: 10x3"  Bridges: 10x  Seated hamstring stretch: 2x30"  Gastrocnemius stretch at wall: 2x30"         PATIENT EDUCATION AND HOME EXERCISES     Education provided:   - Role of PT, POC, and HEP    Written Home Exercises Provided: yes. Exercises were reviewed and Marsha was able to demonstrate them prior to the end of the session.  Marsha demonstrated good  understanding of the education provided. See EMR under Patient Instructions for exercises provided during therapy sessions.    ASSESSMENT     Marsha is a 77 y.o. female referred to outpatient Physical Therapy with a medical diagnosis of Bilateral primary osteoarthritis of knee. Patient presents with bilateral knee arthritis (left>right) affecting functional mobility and participation in recreational activities. She has decreased knee P/AROM, lower extremity weakness, and joint hypomobility. She tolerated initial treatment session well with patient reporting decreased stiffness when walking.      Patient prognosis is Good.   Patient will benefit from skilled outpatient Physical Therapy to address the deficits stated above and in the chart below, provide patient /family education, and to maximize patientt's level of independence.     Plan of care discussed with patient: Yes  Patient's spiritual, cultural and educational needs considered and patient is agreeable to the plan of care and goals as stated below:     Anticipated Barriers for therapy: scheduling    Medical Necessity is demonstrated by the following  History  Co-morbidities and personal factors that may impact the plan of care Co-morbidities:    Hypertension, Osteopenia, High BMI    Personal Factors:   social background     moderate   Examination  Body Structures and Functions, activity limitations and participation restrictions that " may impact the plan of care Body Regions:   lower extremities    Body Systems:    gross symmetry  ROM  strength  balance    Participation Restrictions:   none    Activity limitations:   Learning and applying knowledge  no deficits    General Tasks and Commands  no deficits    Communication  no deficits    Mobility  walking    Self care  no deficits    Domestic Life  shopping  doing house work (cleaning house, washing dishes, laundry)    Interactions/Relationships  no deficits    Life Areas  no deficits    Community and Social Life  community life  recreation and leisure         moderate   Clinical Presentation stable and uncomplicated low   Decision Making/ Complexity Score: low     Goals:  Short Term Goals: 3 weeks   1. The patient will be independent with initial home exercise program to facilitate carryover between treatment sessions.   2.   The patient will achieve 0 degrees of knee extension AROM in 3 weeks in order to walk without limping of hip compensation.   3.   The patient will demonstrate improved quad strength and motor control as noted by ability to perform SLR without lag in order to progress into advanced there ex.   4.   The patient will be able to ambulate 15 minutes with </= 2/10 knee pain at wors      Long Term Goals: 10 weeks   1. The patient will demonstrate an increase in quadriceps strength by 1 MMT grade in order to allow the ability to ascend and descend stairs without knee buckling.   2. The patient will demonstrate improved glute strength by 1 MMT grade in order to allow the ability to perform single leg activities without hip compensation or hip drop.  3. The patient will walk for 30 minutes without knee pain in order to perform tasks of shopping for groceries and navigating the supermarket.   4.  Patient will improve FOTO score to </= 39% limited to decrease perceived limitation with mobility.    PLAN   Plan of care Certification: 8/19/2022 to 10/31/2022.    Outpatient Physical Therapy  2 times weekly for 10 weeks to include the following interventions: Gait Training, Manual Therapy, Moist Heat/ Ice, Neuromuscular Re-ed, Patient Education, Therapeutic Activities, Therapeutic Exercise and Dry Needling.     Anny Erwin, PT      I CERTIFY THE NEED FOR THESE SERVICES FURNISHED UNDER THIS PLAN OF TREATMENT AND WHILE UNDER MY CARE   Physician's comments:     Physician's Signature: ___________________________________________________

## 2022-09-14 ENCOUNTER — CLINICAL SUPPORT (OUTPATIENT)
Dept: REHABILITATION | Facility: HOSPITAL | Age: 77
End: 2022-09-14
Payer: COMMERCIAL

## 2022-09-14 DIAGNOSIS — M62.81 QUADRICEPS WEAKNESS: ICD-10-CM

## 2022-09-14 DIAGNOSIS — Z74.09 IMPAIRED FUNCTIONAL MOBILITY AND ACTIVITY TOLERANCE: Primary | ICD-10-CM

## 2022-09-14 DIAGNOSIS — M25.662 DECREASED RANGE OF MOTION (ROM) OF BOTH KNEES: ICD-10-CM

## 2022-09-14 DIAGNOSIS — M25.661 DECREASED RANGE OF MOTION (ROM) OF BOTH KNEES: ICD-10-CM

## 2022-09-14 PROCEDURE — 97110 THERAPEUTIC EXERCISES: CPT | Mod: PO,CQ

## 2022-09-14 PROCEDURE — 97140 MANUAL THERAPY 1/> REGIONS: CPT | Mod: PO,CQ

## 2022-09-14 NOTE — PROGRESS NOTES
"[facility]   Physical Therapy Treatment Note     Name: Marsha Lopez [place]  Clinic Number: [id number]    Therapy Diagnosis:   Encounter Diagnoses   Name Primary?    Impaired functional mobility and activity tolerance Yes    Decreased range of motion (ROM) of both knees     Quadriceps weakness      Physician: Marcos Sevilla MD    Visit Date: 9/14/2022    Physician Orders: PT Eval and Treat   Medical Diagnosis from Referral: M17.0 (ICD-10-CM) - Bilateral primary osteoarthritis of knee   Evaluation Date: 8/19/2022  Authorization Period Expiration: 08/19/2023  Plan of Care Expiration: 10/31/2022  Progress Note Due: 9/19/2022  Visit # / Visits authorized: 1/ 20  FOTO: 1/3     Precautions: Standard     PTA Visit #: 1/5     Time In: 1:15 pm  Time Out: 1:55 pm  Total Billable Time: 40 minutes    SUBJECTIVE     Pt reports: she went to [place] for the [employer] game and she did a lot of walking  She was partially compliant with home exercise program.  Response to previous treatment: initial evaluation  Functional change: ongoing    Pain: 6/10  Location: bilateral knees (L>R)      OBJECTIVE     Objective Measures updated at progress report unless specified.     Treatment     Marsha received the treatments listed below:      therapeutic exercises to develop strength, endurance and flexibility for 30 minutes including:  Nu step 6'  SLR: 10x  Quad sets: 20x3"  SAQ 2x10  Bridges: 2x10  Seated hamstring stretch: 2x30"  Gastrocnemius stretch at wall: 2x30"         manual therapy techniques: Joint mobilizations and Soft tissue Mobilization were applied to the: bilateral knees for 10 minutes, including:  Patella mobilization  Tibiofemoral joint distraction with patient seated      Patient Education and Home Exercises     Home Exercises Provided and Patient Education Provided     Education provided:   - HEP    Written Home Exercises Provided: Patient instructed to cont prior HEP. Exercises were reviewed and " Marsha was able to demonstrate them prior to the end of the session.  Marsha demonstrated good  understanding of the education provided. See EMR under Patient Instructions for exercises provided during therapy sessions    ASSESSMENT     Marsha presents to treatment with pain in bilateral knees with left > right. Favorable response to manual therapy interventions for decreasing pain. Fair tolerance to exercises with mild knee discomfort noted. Instructed patient on importance of daily HEP. Continue to progress patient as tolerated.     Marsha Is progressing well towards her goals.   Pt prognosis is Good.     Pt will continue to benefit from skilled outpatient physical therapy to address the deficits listed in the problem list box on initial evaluation, provide pt/family education and to maximize pt's level of independence in the home and community environment.     Pt's spiritual, cultural and educational needs considered and pt agreeable to plan of care and goals.     Anticipated Barriers for therapy: scheduling     Goals:  Short Term Goals: 3 weeks   The patient will be independent with initial home exercise program to facilitate carryover between treatment sessions.   2.   The patient will achieve 0 degrees of knee extension AROM in 3 weeks in order to walk without limping of hip compensation.   3.   The patient will demonstrate improved quad strength and motor control as noted by ability to perform SLR without lag in order to progress into advanced there ex.   4.   The patient will be able to ambulate 15 minutes with </= 2/10 knee pain at wors        Long Term Goals: 10 weeks   1. The patient will demonstrate an increase in quadriceps strength by 1 MMT grade in order to allow the ability to ascend and descend stairs without knee buckling.   2. The patient will demonstrate improved glute strength by 1 MMT grade in order to allow the ability to perform single leg activities without hip compensation or hip drop.  3.  The patient will walk for 30 minutes without knee pain in order to perform tasks of shopping for groceries and navigating the supermarket.   4.  Patient will improve FOTO score to </= 39% limited to decrease perceived limitation with mobility.    PLAN     Continue to progress per PT POC    Suzetet Villanueva, GIOVANNA

## 2022-09-20 ENCOUNTER — CLINICAL SUPPORT (OUTPATIENT)
Dept: REHABILITATION | Facility: HOSPITAL | Age: 77
End: 2022-09-20
Payer: COMMERCIAL

## 2022-09-20 DIAGNOSIS — M62.81 QUADRICEPS WEAKNESS: ICD-10-CM

## 2022-09-20 DIAGNOSIS — M25.662 DECREASED RANGE OF MOTION (ROM) OF BOTH KNEES: ICD-10-CM

## 2022-09-20 DIAGNOSIS — M25.661 DECREASED RANGE OF MOTION (ROM) OF BOTH KNEES: ICD-10-CM

## 2022-09-20 DIAGNOSIS — Z74.09 IMPAIRED FUNCTIONAL MOBILITY AND ACTIVITY TOLERANCE: Primary | ICD-10-CM

## 2022-09-20 PROCEDURE — 97110 THERAPEUTIC EXERCISES: CPT | Mod: PO

## 2022-09-20 PROCEDURE — 97140 MANUAL THERAPY 1/> REGIONS: CPT | Mod: PO,97

## 2022-09-20 NOTE — PROGRESS NOTES
[facility]   Physical Therapy Treatment Note / Progress Note / Updated Plan of Care    Name: Marsha Lopez [place]  Clinic Number: [id number]    Therapy Diagnosis:   Encounter Diagnoses   Name Primary?    Impaired functional mobility and activity tolerance Yes    Decreased range of motion (ROM) of both knees     Quadriceps weakness        Physician: Marcos Sevilla MD    Visit Date: 9/20/2022    Physician Orders: PT Eval and Treat   Medical Diagnosis from Referral: M17.0 (ICD-10-CM) - Bilateral primary osteoarthritis of knee   Evaluation Date: 8/19/2022  Authorization Period Expiration: 08/19/2023  Plan of Care Expiration: 12/31/2022  Next Progress Note Due: 10/20/2022  Visit # / Visits authorized: 2 / 20  FOTO: 1/3     Precautions: Standard     PTA Visit #: 0/5       Time In: 115  Time Out: 200  Total Billable Time: 45 minutes    SUBJECTIVE     Pt reports: she went to [place] for the [employer] game and she did a lot of walking and line dancing. No problems until she started doing her HEP. Took tylenol prior to today's appointment     She was compliant with home exercise program.  Response to previous treatment: able to walk without her cane  Functional change: ongoing      Pain: 6/10  Location: bilateral knees (L>R)        Patients goals: she would like to be able to walk >/= 30 minutes prior to onset of knee pain, walk without an assistive device     OBJECTIVE     Objective Measures updated at progress report unless specified.     Knee ROM: (measured in degrees):     Knee AROM PROM Comments   Right 2-138 0-140     Left 3-118 5-125 Pain with overpressure into extension and flexion.            Treatment     Marsha received the treatments listed below:      therapeutic exercises to develop strength, endurance and flexibility for 35 minutes including:  Reassessment   Nu step 6'    +LAQ:   +TKE, GTB: 20x /each  +Shuttle, double squats 75#: 3 minutes  SLR: 20x  SAQ with isometric hip adduction:  "2x10  Bridges: 3x10  Seated hamstring stretch: 2x30"  Gastrocnemius stretch at wall: 2x30"         manual therapy techniques: Joint mobilizations and Soft tissue Mobilization were applied to the: bilateral knees for 10 minutes, including:  Patella mobilization  Tibiofemoral joint distraction with patient seated      Patient Education and Home Exercises     Home Exercises Provided and Patient Education Provided     Education provided:   - HEP    Written Home Exercises Provided: Patient instructed to cont prior HEP. Exercises were reviewed and Marsha was able to demonstrate them prior to the end of the session.  Marsha demonstrated good  understanding of the education provided. See EMR under Patient Instructions for exercises provided during therapy sessions    ASSESSMENT     Marsha has only attended 2 treatment sessions since initial evaluation on 8/19/22 but has made vast improvement in right knee P/AROM. However, she still has limitations in left knee mobility secondary to pain and weakness in quadriceps which is evident by poor quadriceps contraction. She will continue to benefit from physical therapy to address patient specific goals - to be able to walk >/= 30 minutes prior to onset of knee pain.      PT/PTA met face to face to discuss pt's treatment plan and progress towards established goals. Pt will be seen by a physical therapist minimally every 6th visit or every 30 days.  Please see Updated Plan of Care dated 09/20/2022 for changes and updated goals.          Marsha Is progressing well towards her goals.   Pt prognosis is Good.     Pt will continue to benefit from skilled outpatient physical therapy to address the deficits listed in the problem list box on initial evaluation, provide pt/family education and to maximize pt's level of independence in the home and community environment.     Pt's spiritual, cultural and educational needs considered and pt agreeable to plan of care and goals.     Anticipated " Barriers for therapy: scheduling     Goals:  Short Term Goals: 3 weeks   The patient will be independent with initial home exercise program to facilitate carryover between treatment sessions. Met  2.   The patient will achieve 0 degrees of knee extension AROM in 3 weeks in order to walk without limping of hip compensation.   3.   The patient will demonstrate improved quad strength and motor control as noted by ability to perform SLR without lag in order to progress into advanced there ex.   4.   The patient will be able to ambulate 15 minutes with </= 2/10 knee pain at worse.        Long Term Goals: 10 weeks   1. The patient will demonstrate an increase in quadriceps strength by 1 MMT grade in order to allow the ability to ascend and descend stairs without knee buckling.   2. The patient will demonstrate improved glute strength by 1 MMT grade in order to allow the ability to perform single leg activities without hip compensation or hip drop.  3. The patient will walk for 30 minutes without knee pain in order to perform tasks of shopping for groceries and navigating the supermarket.   4.  Patient will improve FOTO score to </= 39% limited to decrease perceived limitation with mobility.    PLAN     Updated Certification Period: 9/20/2022 to 12/31/2022   Recommended Treatment Plan: 2 times per week for 3 months:  Cervical/Lumbar Traction, Manual Therapy, Moist Heat/ Ice, Neuromuscular Re-ed, Patient Education, Therapeutic Activities, and Therapeutic Exercise  Other Recommendations: none      I CERTIFY THE NEED FOR THESE SERVICES FURNISHED UNDER THIS PLAN OF TREATMENT AND WHILE UNDER MY CARE  Physician's comments:      Physician's Signature: ___________________________________________________    Anny Erwin, PT

## 2022-09-20 NOTE — PLAN OF CARE
[facility]   Physical Therapy Treatment Note / Progress Note / Updated Plan of Care    Name: Marsha Lopez [place]  Clinic Number: [id number]    Therapy Diagnosis:   Encounter Diagnoses   Name Primary?    Impaired functional mobility and activity tolerance Yes    Decreased range of motion (ROM) of both knees     Quadriceps weakness        Physician: Marcos Sevilla MD    Visit Date: 9/20/2022    Physician Orders: PT Eval and Treat   Medical Diagnosis from Referral: M17.0 (ICD-10-CM) - Bilateral primary osteoarthritis of knee   Evaluation Date: 8/19/2022  Authorization Period Expiration: 08/19/2023  Plan of Care Expiration: 12/31/2022  Next Progress Note Due: 10/20/2022  Visit # / Visits authorized: 2 / 20  FOTO: 1/3     Precautions: Standard     PTA Visit #: 0/5       Time In: 115  Time Out: 200  Total Billable Time: 45 minutes    SUBJECTIVE     Pt reports: she went to [place] for the [employer] game and she did a lot of walking and line dancing. No problems until she started doing her HEP. Took tylenol prior to today's appointment     She was compliant with home exercise program.  Response to previous treatment: able to walk without her cane  Functional change: ongoing      Pain: 6/10  Location: bilateral knees (L>R)        Patients goals: she would like to be able to walk >/= 30 minutes prior to onset of knee pain, walk without an assistive device     OBJECTIVE     Objective Measures updated at progress report unless specified.     Knee ROM: (measured in degrees):     Knee AROM PROM Comments   Right 2-138 0-140     Left 3-118 5-125 Pain with overpressure into extension and flexion.            Treatment     Marsha received the treatments listed below:      therapeutic exercises to develop strength, endurance and flexibility for 35 minutes including:  Reassessment   Nu step 6'    +LAQ:   +TKE, GTB: 20x /each  +Shuttle, double squats 75#: 3 minutes  SLR: 20x  SAQ with isometric hip adduction:  "2x10  Bridges: 3x10  Seated hamstring stretch: 2x30"  Gastrocnemius stretch at wall: 2x30"         manual therapy techniques: Joint mobilizations and Soft tissue Mobilization were applied to the: bilateral knees for 10 minutes, including:  Patella mobilization  Tibiofemoral joint distraction with patient seated      Patient Education and Home Exercises     Home Exercises Provided and Patient Education Provided     Education provided:   - HEP    Written Home Exercises Provided: Patient instructed to cont prior HEP. Exercises were reviewed and Marsha was able to demonstrate them prior to the end of the session.  Marsha demonstrated good  understanding of the education provided. See EMR under Patient Instructions for exercises provided during therapy sessions    ASSESSMENT     Marsha has only attended 2 treatment sessions since initial evaluation on 8/19/22 but has made vast improvement in right knee P/AROM. However, she still has limitations in left knee mobility secondary to pain and weakness in quadriceps which is evident by poor quadriceps contraction. She will continue to benefit from physical therapy to address patient specific goals - to be able to walk >/= 30 minutes prior to onset of knee pain.      PT/PTA met face to face to discuss pt's treatment plan and progress towards established goals. Pt will be seen by a physical therapist minimally every 6th visit or every 30 days.  Please see Updated Plan of Care dated 09/20/2022 for changes and updated goals.          Marsha Is progressing well towards her goals.   Pt prognosis is Good.     Pt will continue to benefit from skilled outpatient physical therapy to address the deficits listed in the problem list box on initial evaluation, provide pt/family education and to maximize pt's level of independence in the home and community environment.     Pt's spiritual, cultural and educational needs considered and pt agreeable to plan of care and goals.     Anticipated " Barriers for therapy: scheduling     Goals:  Short Term Goals: 3 weeks   The patient will be independent with initial home exercise program to facilitate carryover between treatment sessions. Met  2.   The patient will achieve 0 degrees of knee extension AROM in 3 weeks in order to walk without limping of hip compensation.   3.   The patient will demonstrate improved quad strength and motor control as noted by ability to perform SLR without lag in order to progress into advanced there ex.   4.   The patient will be able to ambulate 15 minutes with </= 2/10 knee pain at worse.        Long Term Goals: 10 weeks   1. The patient will demonstrate an increase in quadriceps strength by 1 MMT grade in order to allow the ability to ascend and descend stairs without knee buckling.   2. The patient will demonstrate improved glute strength by 1 MMT grade in order to allow the ability to perform single leg activities without hip compensation or hip drop.  3. The patient will walk for 30 minutes without knee pain in order to perform tasks of shopping for groceries and navigating the supermarket.   4.  Patient will improve FOTO score to </= 39% limited to decrease perceived limitation with mobility.    PLAN     Updated Certification Period: 9/20/2022 to 12/31/2022   Recommended Treatment Plan: 2 times per week for 3 months:  Cervical/Lumbar Traction, Manual Therapy, Moist Heat/ Ice, Neuromuscular Re-ed, Patient Education, Therapeutic Activities, and Therapeutic Exercise  Other Recommendations: none      I CERTIFY THE NEED FOR THESE SERVICES FURNISHED UNDER THIS PLAN OF TREATMENT AND WHILE UNDER MY CARE  Physician's comments:      Physician's Signature: ___________________________________________________    Anny Erwin, PT

## 2022-09-22 ENCOUNTER — CLINICAL SUPPORT (OUTPATIENT)
Dept: REHABILITATION | Facility: HOSPITAL | Age: 77
End: 2022-09-22
Payer: COMMERCIAL

## 2022-09-22 DIAGNOSIS — M25.661 DECREASED RANGE OF MOTION (ROM) OF BOTH KNEES: ICD-10-CM

## 2022-09-22 DIAGNOSIS — Z74.09 IMPAIRED FUNCTIONAL MOBILITY AND ACTIVITY TOLERANCE: Primary | ICD-10-CM

## 2022-09-22 DIAGNOSIS — M62.81 QUADRICEPS WEAKNESS: ICD-10-CM

## 2022-09-22 DIAGNOSIS — M25.662 DECREASED RANGE OF MOTION (ROM) OF BOTH KNEES: ICD-10-CM

## 2022-09-22 PROCEDURE — 97140 MANUAL THERAPY 1/> REGIONS: CPT | Mod: PO,CQ,97

## 2022-09-22 PROCEDURE — 97110 THERAPEUTIC EXERCISES: CPT | Mod: PO,CQ

## 2022-09-22 NOTE — PROGRESS NOTES
"[facility]   Physical Therapy Treatment Note     Name: Marsha Lopez [place]  Clinic Number: [id number]    Therapy Diagnosis:   Encounter Diagnoses   Name Primary?    Impaired functional mobility and activity tolerance Yes    Decreased range of motion (ROM) of both knees     Quadriceps weakness          Physician: Marcos Sevilla MD    Visit Date: 9/22/2022    Physician Orders: PT Eval and Treat   Medical Diagnosis from Referral: M17.0 (ICD-10-CM) - Bilateral primary osteoarthritis of knee   Evaluation Date: 8/19/2022  Authorization Period Expiration: 08/19/2023  Plan of Care Expiration: 12/31/2022  Next Progress Note Due: 10/20/2022  Visit # / Visits authorized: 3 / 20   FOTO: 1/3     Precautions: Standard     PTA Visit #: 1/5       Time In: 11:00 am  Time Out: 11:44 am  Total Billable Time: 44 minutes    SUBJECTIVE     Pt reports: she is feeling pretty good today. She won't be here for a couple weeks because she's going to [place] for the [place] game    She was compliant with home exercise program.  Response to previous treatment: able to walk without her cane  Functional change: ongoing      Pain: 3/10  Location: bilateral knees (L>R)        Patients goals: she would like to be able to walk >/= 30 minutes prior to onset of knee pain, walk without an assistive device     OBJECTIVE     Objective Measures updated at progress report unless specified.        Treatment     Marsha received the treatments listed below:      therapeutic exercises to develop strength, endurance and flexibility for 34 minutes including:    Nu step 6'    LAQ: 20x  TKE, GTB: 20x /each  Shuttle, double squats 75#: 3 minutes  SLR: 20x  SAQ with isometric hip adduction: 2x10  Bridges: 3x10  Seated hamstring stretch: 2x30"  Gastrocnemius stretch at wall: 2x30"         manual therapy techniques: Joint mobilizations and Soft tissue Mobilization were applied to the: bilateral knees for 10 minutes, including:  Patella " mobilization  Tibiofemoral joint distraction with patient seated      Patient Education and Home Exercises     Home Exercises Provided and Patient Education Provided     Education provided:   - HEP    Written Home Exercises Provided: Patient instructed to cont prior HEP. Exercises were reviewed and Marsha was able to demonstrate them prior to the end of the session.  Marsha demonstrated good  understanding of the education provided. See EMR under Patient Instructions for exercises provided during therapy sessions    ASSESSMENT     Marsha presents to treatment with less pain today. Good tolerance to exercises with no increase in pain. She continues to be challenged by SLR due to weakness. Instructed patient to continue performing HEP since she will not be able to attend PT for 2 weeks. Continue to progress when she returns from her trip.    aMrsha Is progressing well towards her goals.   Pt prognosis is Good.     Pt will continue to benefit from skilled outpatient physical therapy to address the deficits listed in the problem list box on initial evaluation, provide pt/family education and to maximize pt's level of independence in the home and community environment.     Pt's spiritual, cultural and educational needs considered and pt agreeable to plan of care and goals.     Anticipated Barriers for therapy: scheduling     Goals:  Short Term Goals: 3 weeks   The patient will be independent with initial home exercise program to facilitate carryover between treatment sessions. Met  2.   The patient will achieve 0 degrees of knee extension AROM in 3 weeks in order to walk without limping of hip compensation.   3.   The patient will demonstrate improved quad strength and motor control as noted by ability to perform SLR without lag in order to progress into advanced there ex.   4.   The patient will be able to ambulate 15 minutes with </= 2/10 knee pain at worse.        Long Term Goals: 10 weeks   1. The patient will  demonstrate an increase in quadriceps strength by 1 MMT grade in order to allow the ability to ascend and descend stairs without knee buckling.   2. The patient will demonstrate improved glute strength by 1 MMT grade in order to allow the ability to perform single leg activities without hip compensation or hip drop.  3. The patient will walk for 30 minutes without knee pain in order to perform tasks of shopping for groceries and navigating the supermarket.   4.  Patient will improve FOTO score to </= 39% limited to decrease perceived limitation with mobility.    PLAN     Updated Certification Period: 9/20/2022 to 12/31/2022   Recommended Treatment Plan: 2 times per week for 3 months:  Cervical/Lumbar Traction, Manual Therapy, Moist Heat/ Ice, Neuromuscular Re-ed, Patient Education, Therapeutic Activities, and Therapeutic Exercise  Other Recommendations: none        Suzette Villanueva, PTA

## 2022-09-23 ENCOUNTER — TELEPHONE (OUTPATIENT)
Dept: FAMILY MEDICINE | Facility: CLINIC | Age: 77
End: 2022-09-23

## 2022-09-23 NOTE — TELEPHONE ENCOUNTER
Contacted pt to notify when her last flu vaccine was administered and schedule this years flu vaccine, pt denied appointment. Pt say she will call back to schedule after vacation.

## 2022-09-23 NOTE — TELEPHONE ENCOUNTER
----- Message from Geri Kenney sent at 9/23/2022 10:43 AM CDT -----  Type: Patient Call Back    Who called: pt     What is the request in detail:pt requesting to find out when she had her last flu shot. Call pt     Can the clinic reply by [facility]?    Would the patient rather a call back or a response via [facility]? call    Best call back number:[phone] (home)       Additional Information:

## 2022-10-11 ENCOUNTER — CLINICAL SUPPORT (OUTPATIENT)
Dept: REHABILITATION | Facility: HOSPITAL | Age: 77
End: 2022-10-11
Payer: MEDICARE

## 2022-10-11 DIAGNOSIS — M25.661 DECREASED RANGE OF MOTION (ROM) OF BOTH KNEES: ICD-10-CM

## 2022-10-11 DIAGNOSIS — M62.81 QUADRICEPS WEAKNESS: ICD-10-CM

## 2022-10-11 DIAGNOSIS — M25.662 DECREASED RANGE OF MOTION (ROM) OF BOTH KNEES: ICD-10-CM

## 2022-10-11 DIAGNOSIS — Z74.09 IMPAIRED FUNCTIONAL MOBILITY AND ACTIVITY TOLERANCE: Primary | ICD-10-CM

## 2022-10-11 PROCEDURE — 97110 THERAPEUTIC EXERCISES: CPT | Mod: HCNC,PO

## 2022-10-11 PROCEDURE — 97140 MANUAL THERAPY 1/> REGIONS: CPT | Mod: HCNC,PO

## 2022-10-11 NOTE — PROGRESS NOTES
"[facility]   Physical Therapy Treatment Note     Name: Marsha Loepz [place]  Clinic Number: [id number]    Therapy Diagnosis:   Encounter Diagnoses   Name Primary?    Impaired functional mobility and activity tolerance Yes    Decreased range of motion (ROM) of both knees     Quadriceps weakness      Physician: Marcos Sevilla MD    Visit Date: 10/11/2022    Physician Orders: PT Eval and Treat   Medical Diagnosis from Referral: M17.0 (ICD-10-CM) - Bilateral primary osteoarthritis of knee   Evaluation Date: 8/19/2022  Authorization Period Expiration: 08/19/2023  Plan of Care Expiration: 12/31/2022  Next Progress Note Due: 10/20/2022  Visit # / Visits authorized: 4 / 20   FOTO: 1/3     Precautions: Standard     PTA Visit #: 1/5     Time In: 12:30 pm  Time Out: 1:15 pm  Total Billable Time: 44 minutes    SUBJECTIVE     Pt reports: She had a little fall when walking around [place] last week. She was okay, but now her knees are sore from walking around all last week.    She was compliant with home exercise program.  Response to previous treatment: able to walk without her cane  Functional change: ongoing    Pain: 3/10  Location: bilateral knees (L>R)      Patients goals: she would like to be able to walk >/= 30 minutes prior to onset of knee pain, walk without an assistive device     OBJECTIVE     Objective Measures updated at progress report unless specified.     Treatment     Marsha received the treatments listed below:      therapeutic exercises to develop strength, endurance and flexibility for 27 minutes including:    Nu step 6'  LAQ: 20x  TKE, GTB: 20x /each  SLR: 20x  SAQ with isometric hip adduction: 2x10  Bridges with YTB around knees for hip abduction: 3x10    Not Performed:  Seated hamstring stretch: 2x30"  Gastrocnemius stretch at wall: 2x30"   Shuttle, double squats 75#: 3 minutes    manual therapy techniques: Joint mobilizations and Soft tissue Mobilization were applied to the: " bilateral knees for 8 minutes, including:  Patella mobilization  Tibiofemoral joint distraction with patient seated    Moist Heat for 10 minutes    Patient Education and Home Exercises     Home Exercises Provided and Patient Education Provided     Education provided:   - HEP    Written Home Exercises Provided: Patient instructed to cont prior HEP. Exercises were reviewed and Marsha was able to demonstrate them prior to the end of the session.  Marsha demonstrated good  understanding of the education provided. See EMR under Patient Instructions for exercises provided during therapy sessions    ASSESSMENT     Marsha presents to treatment with knee pain and stiffness today. Manual therapy indicated today to mobilize both tibiofemoral and patellofemoral joints. Pt tolerated manual well with reported decreased pain. Exercises progressed with sit to stands. Moist heat to finish session for improved mobility. Continue to progress.    Marsha Is progressing well towards her goals.   Pt prognosis is Good.     Pt will continue to benefit from skilled outpatient physical therapy to address the deficits listed in the problem list box on initial evaluation, provide pt/family education and to maximize pt's level of independence in the home and community environment.     Pt's spiritual, cultural and educational needs considered and pt agreeable to plan of care and goals.     Anticipated Barriers for therapy: scheduling     Goals:  Short Term Goals: 3 weeks   The patient will be independent with initial home exercise program to facilitate carryover between treatment sessions. Met  2.   The patient will achieve 0 degrees of knee extension AROM in 3 weeks in order to walk without limping of hip compensation.   3.   The patient will demonstrate improved quad strength and motor control as noted by ability to perform SLR without lag in order to progress into advanced there ex.   4.   The patient will be able to ambulate 15 minutes with  </= 2/10 knee pain at worse.        Long Term Goals: 10 weeks   1. The patient will demonstrate an increase in quadriceps strength by 1 MMT grade in order to allow the ability to ascend and descend stairs without knee buckling.   2. The patient will demonstrate improved glute strength by 1 MMT grade in order to allow the ability to perform single leg activities without hip compensation or hip drop.  3. The patient will walk for 30 minutes without knee pain in order to perform tasks of shopping for groceries and navigating the supermarket.   4.  Patient will improve FOTO score to </= 39% limited to decrease perceived limitation with mobility.    PLAN     Updated Certification Period: 9/20/2022 to 12/31/2022   Recommended Treatment Plan: 2 times per week for 3 months:  Cervical/Lumbar Traction, Manual Therapy, Moist Heat/ Ice, Neuromuscular Re-ed, Patient Education, Therapeutic Activities, and Therapeutic Exercise  Other Recommendations: none        Benjamin Mohamud, PT, DPT

## 2022-10-13 ENCOUNTER — CLINICAL SUPPORT (OUTPATIENT)
Dept: REHABILITATION | Facility: HOSPITAL | Age: 77
End: 2022-10-13
Payer: COMMERCIAL

## 2022-10-13 DIAGNOSIS — M25.661 DECREASED RANGE OF MOTION (ROM) OF BOTH KNEES: ICD-10-CM

## 2022-10-13 DIAGNOSIS — M25.662 DECREASED RANGE OF MOTION (ROM) OF BOTH KNEES: ICD-10-CM

## 2022-10-13 DIAGNOSIS — M62.81 QUADRICEPS WEAKNESS: ICD-10-CM

## 2022-10-13 DIAGNOSIS — Z74.09 IMPAIRED FUNCTIONAL MOBILITY AND ACTIVITY TOLERANCE: Primary | ICD-10-CM

## 2022-10-13 PROCEDURE — 97140 MANUAL THERAPY 1/> REGIONS: CPT | Mod: HCNC,PO,CQ,97

## 2022-10-13 PROCEDURE — 97110 THERAPEUTIC EXERCISES: CPT | Mod: HCNC,PO,CQ,97

## 2022-10-13 NOTE — PROGRESS NOTES
"[facility]   Physical Therapy Treatment Note     Name: Marsha Lopez [place]  Clinic Number: [id number]    Therapy Diagnosis:   Encounter Diagnoses   Name Primary?    Impaired functional mobility and activity tolerance Yes    Decreased range of motion (ROM) of both knees     Quadriceps weakness        Physician: Marcos Sevilla MD    Visit Date: 10/13/2022    Physician Orders: PT Eval and Treat   Medical Diagnosis from Referral: M17.0 (ICD-10-CM) - Bilateral primary osteoarthritis of knee   Evaluation Date: 8/19/2022  Authorization Period Expiration: 08/19/2023  Plan of Care Expiration: 12/31/2022  Next Progress Note Due: 10/20/2022  Visit # / Visits authorized: 5 / 20   FOTO: 1/3     Precautions: Standard     PTA Visit #: 1/5     Time In: 1:15 pm  Time Out: 1:58 pm  Total Billable Time: 43 minutes    SUBJECTIVE     Pt reports: she's still sore from falling while she was in [place]    She was compliant with home exercise program.  Response to previous treatment: able to walk without her cane  Functional change: ongoing    Pain: 3/10  Location: bilateral knees (L>R)      Patients goals: she would like to be able to walk >/= 30 minutes prior to onset of knee pain, walk without an assistive device     OBJECTIVE     Objective Measures updated at progress report unless specified.     Treatment     Marsha received the treatments listed below:      therapeutic exercises to develop strength, endurance and flexibility for 33 minutes including:    Nu step 6'  LAQ: 20x  TKE, GTB: 20x /each  SLR: 20x  SAQ with isometric hip adduction: 2x10  Bridges with YTB around knees for hip abduction: 3x10  Sit to stand from gold chair with 5# KB 2x10    Not Performed:  Shuttle, double squats 75#: 3 minutes  Seated hamstring stretch: 2x30"  Gastrocnemius stretch at wall: 2x30"         manual therapy techniques: Joint mobilizations and Soft tissue Mobilization were applied to the: bilateral knees for 10 minutes, " including:  Patella mobilization  Tibiofemoral joint distraction with patient seated    Moist Heat for 10 minutes    Patient Education and Home Exercises     Home Exercises Provided and Patient Education Provided     Education provided:   - HEP    Written Home Exercises Provided: Patient instructed to cont prior HEP. Exercises were reviewed and Marsha was able to demonstrate them prior to the end of the session.  Marsha demonstrated good  understanding of the education provided. See EMR under Patient Instructions for exercises provided during therapy sessions    ASSESSMENT     Marsha presents to treatment with continued soreness and stiffness in bilateral knee. She also had some discomfort in left groin following SLR. She was able to tolerate all other exercises with no increase in pain. Continue to progress patient as tolerated.     Marsha Is progressing well towards her goals.   Pt prognosis is Good.     Pt will continue to benefit from skilled outpatient physical therapy to address the deficits listed in the problem list box on initial evaluation, provide pt/family education and to maximize pt's level of independence in the home and community environment.     Pt's spiritual, cultural and educational needs considered and pt agreeable to plan of care and goals.     Anticipated Barriers for therapy: scheduling     Goals:  Short Term Goals: 3 weeks   The patient will be independent with initial home exercise program to facilitate carryover between treatment sessions. Met  2.   The patient will achieve 0 degrees of knee extension AROM in 3 weeks in order to walk without limping of hip compensation.   3.   The patient will demonstrate improved quad strength and motor control as noted by ability to perform SLR without lag in order to progress into advanced there ex.   4.   The patient will be able to ambulate 15 minutes with </= 2/10 knee pain at worse.        Long Term Goals: 10 weeks   1. The patient will demonstrate  an increase in quadriceps strength by 1 MMT grade in order to allow the ability to ascend and descend stairs without knee buckling.   2. The patient will demonstrate improved glute strength by 1 MMT grade in order to allow the ability to perform single leg activities without hip compensation or hip drop.  3. The patient will walk for 30 minutes without knee pain in order to perform tasks of shopping for groceries and navigating the supermarket.   4.  Patient will improve FOTO score to </= 39% limited to decrease perceived limitation with mobility.    PLAN     Updated Certification Period: 9/20/2022 to 12/31/2022   Recommended Treatment Plan: 2 times per week for 3 months:  Cervical/Lumbar Traction, Manual Therapy, Moist Heat/ Ice, Neuromuscular Re-ed, Patient Education, Therapeutic Activities, and Therapeutic Exercise  Other Recommendations: none        [place] Villanueva, PTA

## 2022-10-19 ENCOUNTER — CLINICAL SUPPORT (OUTPATIENT)
Dept: REHABILITATION | Facility: HOSPITAL | Age: 77
End: 2022-10-19
Attending: INTERNAL MEDICINE
Payer: COMMERCIAL

## 2022-10-19 DIAGNOSIS — Z74.09 IMPAIRED FUNCTIONAL MOBILITY AND ACTIVITY TOLERANCE: Primary | ICD-10-CM

## 2022-10-19 DIAGNOSIS — M62.81 QUADRICEPS WEAKNESS: ICD-10-CM

## 2022-10-19 DIAGNOSIS — M25.662 DECREASED RANGE OF MOTION (ROM) OF BOTH KNEES: ICD-10-CM

## 2022-10-19 DIAGNOSIS — M25.661 DECREASED RANGE OF MOTION (ROM) OF BOTH KNEES: ICD-10-CM

## 2022-10-19 PROCEDURE — 97110 THERAPEUTIC EXERCISES: CPT | Mod: HCNC,PO,CQ

## 2022-10-19 NOTE — PROGRESS NOTES
"[facility]   Physical Therapy Treatment Note     Name: Marsha Lopez [place]  Clinic Number: [id number]    Therapy Diagnosis:   Encounter Diagnoses   Name Primary?    Impaired functional mobility and activity tolerance Yes    Decreased range of motion (ROM) of both knees     Quadriceps weakness        Physician: No ref. provider found    Visit Date: 10/19/2022    Physician Orders: PT Eval and Treat   Medical Diagnosis from Referral: M17.0 (ICD-10-CM) - Bilateral primary osteoarthritis of knee   Evaluation Date: 8/19/2022  Authorization Period Expiration: 08/19/2023  Plan of Care Expiration: 12/31/2022  Next Progress Note Due: 10/20/2022  Visit # / Visits authorized: 7/ 20   FOTO: 1/3     Precautions: Standard     PTA Visit #: 1/5     Time In: 1:45 pm (pt arrived late)   Time Out: 2:23 pm   Total Billable Time: 38 minutes    SUBJECTIVE     Pt reports: a little pain in both knees L>R from [place]     She was compliant with home exercise program.  Response to previous treatment: muscle soreness  Functional change: decreased knee pain    Pain: 5-6/10  Location: bilateral knees (L>R)      Patients goals: she would like to be able to walk >/= 30 minutes prior to onset of knee pain, walk without an assistive device     OBJECTIVE     Objective Measures updated at progress report unless specified.     Treatment     Marsha received the treatments listed below:      therapeutic exercises to develop strength, endurance and flexibility for 38 minutes including:    Recumbent bike 6'  LAQ: 20x  TKE, GTB: 20x /each  SLR 2x10  SAQ with isometric hip adduction: 2x10  Bridges with YTB around knees for hip abduction: 2x10 5" hold  Sit to stand from gold chair with 10# KB 2x10  +Step up 6in x15  Pt education  Not Performed:  Shuttle, double squats 75#: 3 minutes  Seated hamstring stretch: 2x30"  Gastrocnemius stretch at wall: 2x30"     manual therapy techniques: Joint mobilizations and Soft tissue " Mobilization were applied to the: bilateral knees for 00 minutes, including:  Patella mobilization  Tibiofemoral joint distraction with patient seated    Moist Heat for 10 minutes    Patient Education and Home Exercises     Home Exercises Provided and Patient Education Provided     Education provided:   - HEP    Written Home Exercises Provided: Patient instructed to cont prior HEP. Exercises were reviewed and Marsha was able to demonstrate them prior to the end of the session.  Marsha demonstrated good  understanding of the education provided. See EMR under Patient Instructions for exercises provided during therapy sessions    ASSESSMENT     Today's session focused on hip bilateral hip strengthening which Marsha tolerated well without any complaints of pain. Added step ups today, and pt demonstrated ability to properly navigate stairs with the use of UE support. PT received HEP, and was encourage to perform daily/weekly to maintain progressed made at PT.    Marsha Is progressing well towards her goals.   Pt prognosis is Good.     Pt will continue to benefit from skilled outpatient physical therapy to address the deficits listed in the problem list box on initial evaluation, provide pt/family education and to maximize pt's level of independence in the home and community environment.     Pt's spiritual, cultural and educational needs considered and pt agreeable to plan of care and goals.     Anticipated Barriers for therapy: scheduling     Goals:  Short Term Goals: 3 weeks   The patient will be independent with initial home exercise program to facilitate carryover between treatment sessions. Met  2.   The patient will achieve 0 degrees of knee extension AROM in 3 weeks in order to walk without limping of hip compensation.   3.   The patient will demonstrate improved quad strength and motor control as noted by ability to perform SLR without lag in order to progress into advanced there ex.   4.   The patient will be  able to ambulate 15 minutes with </= 2/10 knee pain at worse.        Long Term Goals: 10 weeks   1. The patient will demonstrate an increase in quadriceps strength by 1 MMT grade in order to allow the ability to ascend and descend stairs without knee buckling.   2. The patient will demonstrate improved glute strength by 1 MMT grade in order to allow the ability to perform single leg activities without hip compensation or hip drop.  3. The patient will walk for 30 minutes without knee pain in order to perform tasks of shopping for groceries and navigating the supermarket.   4.  Patient will improve FOTO score to </= 39% limited to decrease perceived limitation with mobility.    PLAN     Updated Certification Period: 9/20/2022 to 12/31/2022   Recommended Treatment Plan: 2 times per week for 3 months:  Cervical/Lumbar Traction, Manual Therapy, Moist Heat/ Ice, Neuromuscular Re-ed, Patient Education, Therapeutic Activities, and Therapeutic Exercise  Other Recommendations: none        Chel Cardenas, PTA

## 2022-10-25 ENCOUNTER — CLINICAL SUPPORT (OUTPATIENT)
Dept: REHABILITATION | Facility: HOSPITAL | Age: 77
End: 2022-10-25
Payer: MEDICARE

## 2022-10-25 DIAGNOSIS — M25.661 DECREASED RANGE OF MOTION (ROM) OF BOTH KNEES: ICD-10-CM

## 2022-10-25 DIAGNOSIS — Z74.09 IMPAIRED FUNCTIONAL MOBILITY AND ACTIVITY TOLERANCE: Primary | ICD-10-CM

## 2022-10-25 DIAGNOSIS — M62.81 QUADRICEPS WEAKNESS: ICD-10-CM

## 2022-10-25 DIAGNOSIS — M25.662 DECREASED RANGE OF MOTION (ROM) OF BOTH KNEES: ICD-10-CM

## 2022-10-25 PROCEDURE — 97110 THERAPEUTIC EXERCISES: CPT | Mod: HCNC,PO,97

## 2022-10-25 NOTE — PROGRESS NOTES
"[facility]   Physical Therapy Treatment Note/Progress Note    Name: Marsha Lopez [place]  Clinic Number: [id number]    Therapy Diagnosis:   Encounter Diagnoses   Name Primary?    Impaired functional mobility and activity tolerance Yes    Decreased range of motion (ROM) of both knees     Quadriceps weakness      Physician: Marcos Sevilla MD    Visit Date: 10/25/2022    Physician Orders: PT Eval and Treat   Medical Diagnosis from Referral: M17.0 (ICD-10-CM) - Bilateral primary osteoarthritis of knee   Evaluation Date: 8/19/2022  Authorization Period Expiration: 08/19/2023  Plan of Care Expiration: 12/31/2022  Next Progress Note Due: 11/25/2022  Visit # / Visits authorized: 6/ 20   FOTO: 1/3     Precautions: Standard     PTA Visit #: 1/5     Time In: 12:35 pm (pt arrived late)   Time Out: 1:15 pm   Total Billable Time: 40 minutes    SUBJECTIVE     Pt reports: Pt feels good. Wants to be challenged today.    She was compliant with home exercise program.  Response to previous treatment: muscle soreness  Functional change: decreased knee pain    Pain: 5-6/10  Location: bilateral knees (L>R)      Patients goals: she would like to be able to walk >/= 30 minutes prior to onset of knee pain, walk without an assistive device     OBJECTIVE     Knee ROM: (measured in degrees):     Knee AROM PROM Comments   Right 0-138 0-140     Left 0-135 0-125 Pain with overpressure into extension and flexion.         Treatment     Marsha received the treatments listed below:      therapeutic exercises to develop strength, endurance and flexibility for 40 minutes including:    Objectives measures  Recumbent bike 6' level 4  LAQ: 20x  TKE, GTB: 20x /each  Sidelying hip abduction 2x10  SAQ with isometric hip adduction: 2x10  Bridges with march with RTB around knees for hip abduction: 2x10 5" hold  Sit to stand from gold chair with 4# medicine ball press 2x10      Not Performed:  Shuttle, double squats 75#: 3 " "minutes  Seated hamstring stretch: 2x30"  Gastrocnemius stretch at wall: 2x30"   Step up 6in x15    manual therapy techniques: Joint mobilizations and Soft tissue Mobilization were applied to the: bilateral knees for 00 minutes, including:  Patella mobilization  Tibiofemoral joint distraction with patient seated    Moist Heat for 10 minutes    Patient Education and Home Exercises     Home Exercises Provided and Patient Education Provided     Education provided:   - HEP    Written Home Exercises Provided: Patient instructed to cont prior HEP. Exercises were reviewed and Marsha was able to demonstrate them prior to the end of the session.  Marsha demonstrated good  understanding of the education provided. See EMR under Patient Instructions for exercises provided during therapy sessions    ASSESSMENT     Pt tolerated session well. Pt's objective measures demonstrates improvement with bilateral knee RANGE OF MOTION. Today's session focused on hip and LE strengthening to improve overall functional mobility. Continue to progress.    Marsha Is progressing well towards her goals.   Pt prognosis is Good.     Pt will continue to benefit from skilled outpatient physical therapy to address the deficits listed in the problem list box on initial evaluation, provide pt/family education and to maximize pt's level of independence in the home and community environment.     Pt's spiritual, cultural and educational needs considered and pt agreeable to plan of care and goals.     Anticipated Barriers for therapy: scheduling     Goals:  Short Term Goals: 3 weeks   The patient will be independent with initial home exercise program to facilitate carryover between treatment sessions. Met  2.   The patient will achieve 0 degrees of knee extension AROM in 3 weeks in order to walk without limping of hip compensation.   3.   The patient will demonstrate improved quad strength and motor control as noted by ability to perform SLR without lag in " order to progress into advanced there ex.   4.   The patient will be able to ambulate 15 minutes with </= 2/10 knee pain at worse.        Long Term Goals: 10 weeks   1. The patient will demonstrate an increase in quadriceps strength by 1 MMT grade in order to allow the ability to ascend and descend stairs without knee buckling.   2. The patient will demonstrate improved glute strength by 1 MMT grade in order to allow the ability to perform single leg activities without hip compensation or hip drop.  3. The patient will walk for 30 minutes without knee pain in order to perform tasks of shopping for groceries and navigating the [employer].   4.  Patient will improve FOTO score to </= 39% limited to decrease perceived limitation with mobility.    PLAN     Updated Certification Period: 9/20/2022 to 12/31/2022   Recommended Treatment Plan: 2 times per week for 3 months:  Cervical/Lumbar Traction, Manual Therapy, Moist Heat/ Ice, Neuromuscular Re-ed, Patient Education, Therapeutic Activities, and Therapeutic Exercise  Other Recommendations: none      Benjamin Mohamud, PT, DPT

## 2022-10-27 ENCOUNTER — CLINICAL SUPPORT (OUTPATIENT)
Dept: REHABILITATION | Facility: HOSPITAL | Age: 77
End: 2022-10-27
Payer: MEDICARE

## 2022-10-27 DIAGNOSIS — M62.81 QUADRICEPS WEAKNESS: ICD-10-CM

## 2022-10-27 DIAGNOSIS — M25.662 DECREASED RANGE OF MOTION (ROM) OF BOTH KNEES: ICD-10-CM

## 2022-10-27 DIAGNOSIS — Z74.09 IMPAIRED FUNCTIONAL MOBILITY AND ACTIVITY TOLERANCE: Primary | ICD-10-CM

## 2022-10-27 DIAGNOSIS — M25.661 DECREASED RANGE OF MOTION (ROM) OF BOTH KNEES: ICD-10-CM

## 2022-10-27 PROCEDURE — 97110 THERAPEUTIC EXERCISES: CPT | Mod: HCNC,PO,CQ,97

## 2022-10-27 NOTE — PROGRESS NOTES
"[facility]   Physical Therapy Treatment Note    Name: Marsha Lopez [place]  Clinic Number: [id number]    Therapy Diagnosis:   Encounter Diagnoses   Name Primary?    Impaired functional mobility and activity tolerance Yes    Decreased range of motion (ROM) of both knees     Quadriceps weakness        Physician: Marcos Sevilla MD    Visit Date: 10/27/2022    Physician Orders: PT Eval and Treat   Medical Diagnosis from Referral: M17.0 (ICD-10-CM) - Bilateral primary osteoarthritis of knee   Evaluation Date: 8/19/2022  Authorization Period Expiration: 08/19/2023  Plan of Care Expiration: 12/31/2022  Next Progress Note Due: 11/25/2022  Visit # / Visits authorized: 7/ 20   FOTO: 1/3     Precautions: Standard     PTA Visit #: 1/5     Time In: 1:15 pm  Time Out: 1:59 pm  Total Billable Time: 44 minutes    SUBJECTIVE     Pt reports: she has "a little bit of pain"    She was compliant with home exercise program.  Response to previous treatment: muscle soreness  Functional change: decreased knee pain    Pain: 3/10  Location: bilateral knees (L>R)      Patients goals: she would like to be able to walk >/= 30 minutes prior to onset of knee pain, walk without an assistive device     OBJECTIVE         Treatment     Marsha received the treatments listed below:      therapeutic exercises to develop strength, endurance and flexibility for 44 minutes including:  Recumbent bike 7' level 4  LAQ with lift off: 20x  Sidelying hip abduction 2x10  Bridges with clams with RTB around knees : 2x10 5" hold  Modified single leg bridge x10 each  Sit to stand from gold chair with 4# medicine ball press 2x10  Lateral walking with YTB around ankles 2 laps at mirrors  Lateral step downs on aerobic step 2x10  SLS with lateral sliders 2x10  SLS 30" each      Not Performed:  Shuttle, double squats 75#: 3 minutes  Seated hamstring stretch: 2x30"  Gastrocnemius stretch at wall: 2x30"  Step up 6in x15  SAQ with isometric hip " adduction: 2x10  TKE, GTB: 20x /each    manual therapy techniques: Joint mobilizations and Soft tissue Mobilization were applied to the: bilateral knees for 00 minutes, including:  Patella mobilization  Tibiofemoral joint distraction with patient seated      Patient Education and Home Exercises     Home Exercises Provided and Patient Education Provided     Education provided:   - HEP    Written Home Exercises Provided: Patient instructed to cont prior HEP. Exercises were reviewed and Marsha was able to demonstrate them prior to the end of the session.  Marsha demonstrated good  understanding of the education provided. See EMR under Patient Instructions for exercises provided during therapy sessions    ASSESSMENT     Marsha presents to treatment with minimal pain in bilateral knees. She is motivated to improve and wants to be challenged throughout session. Exercises were progressed with cueing required for technique. She was most challenged by activities involving single leg stance on RLE. Continue to progress patient as tolerated.     Marsha Is progressing well towards her goals.   Pt prognosis is Good.     Pt will continue to benefit from skilled outpatient physical therapy to address the deficits listed in the problem list box on initial evaluation, provide pt/family education and to maximize pt's level of independence in the home and community environment.     Pt's spiritual, cultural and educational needs considered and pt agreeable to plan of care and goals.     Anticipated Barriers for therapy: scheduling     Goals:  Short Term Goals: 3 weeks   The patient will be independent with initial home exercise program to facilitate carryover between treatment sessions. Met  2.   The patient will achieve 0 degrees of knee extension AROM in 3 weeks in order to walk without limping of hip compensation.   3.   The patient will demonstrate improved quad strength and motor control as noted by ability to perform SLR without  lag in order to progress into advanced there ex.   4.   The patient will be able to ambulate 15 minutes with </= 2/10 knee pain at worse.        Long Term Goals: 10 weeks   1. The patient will demonstrate an increase in quadriceps strength by 1 MMT grade in order to allow the ability to ascend and descend stairs without knee buckling.   2. The patient will demonstrate improved glute strength by 1 MMT grade in order to allow the ability to perform single leg activities without hip compensation or hip drop.  3. The patient will walk for 30 minutes without knee pain in order to perform tasks of shopping for groceries and navigating the [employer].   4.  Patient will improve FOTO score to </= 39% limited to decrease perceived limitation with mobility.    PLAN     Updated Certification Period: 9/20/2022 to 12/31/2022   Recommended Treatment Plan: 2 times per week for 3 months:  Cervical/Lumbar Traction, Manual Therapy, Moist Heat/ Ice, Neuromuscular Re-ed, Patient Education, Therapeutic Activities, and Therapeutic Exercise  Other Recommendations: none      Suzette Villanueva, GIOVANNA

## 2022-11-01 ENCOUNTER — CLINICAL SUPPORT (OUTPATIENT)
Dept: REHABILITATION | Facility: HOSPITAL | Age: 77
End: 2022-11-01
Payer: MEDICARE

## 2022-11-01 DIAGNOSIS — M25.662 DECREASED RANGE OF MOTION (ROM) OF BOTH KNEES: ICD-10-CM

## 2022-11-01 DIAGNOSIS — Z74.09 IMPAIRED FUNCTIONAL MOBILITY AND ACTIVITY TOLERANCE: Primary | ICD-10-CM

## 2022-11-01 DIAGNOSIS — M25.661 DECREASED RANGE OF MOTION (ROM) OF BOTH KNEES: ICD-10-CM

## 2022-11-01 DIAGNOSIS — M62.81 QUADRICEPS WEAKNESS: ICD-10-CM

## 2022-11-01 PROCEDURE — 97110 THERAPEUTIC EXERCISES: CPT | Mod: HCNC,PO,97

## 2022-11-01 PROCEDURE — 97140 MANUAL THERAPY 1/> REGIONS: CPT | Mod: HCNC,PO,97

## 2022-11-02 NOTE — PROGRESS NOTES
"[facility]   Physical Therapy Treatment Note     Name: Marsha Lopez [place]  Clinic Number: [id number]    Therapy Diagnosis:   Encounter Diagnoses   Name Primary?    Impaired functional mobility and activity tolerance Yes    Decreased range of motion (ROM) of both knees     Quadriceps weakness      Physician: Marcos Sevilla MD    Visit Date: 11/1/2022    Physician Orders: PT Eval and Treat   Medical Diagnosis from Referral: M17.0 (ICD-10-CM) - Bilateral primary osteoarthritis of knee   Evaluation Date: 8/19/2022  Authorization Period Expiration: 08/19/2023  Plan of Care Expiration: 12/31/2022  Next Progress Note Due: 11/25/2022  Visit # / Visits authorized: 7/ 20   FOTO: 1/3     Precautions: Standard      PTA Visit #: 1/5      Time In: 200 pm  Time Out: 245 pm  Total Billable Time: 45 minutes     SUBJECTIVE      Pt reports: no change in status since last visit and is agreeable to today's treatment.      She was compliant with home exercise program.  Response to previous treatment: muscle soreness  Functional change: decreased knee pain     Pain: 3/10  Location: bilateral knees (L>R)       Patients goals: she would like to be able to walk >/= 30 minutes prior to onset of knee pain, walk without an assistive device      OBJECTIVE       To be updated at progress note unless specified otherwise      Treatment      Marsha received the treatments listed below:       therapeutic exercises to develop strength, endurance and flexibility for  35 minutes including:  Recumbent bike 7' level 4  LAQ : 2x15 2#  Supine SLR 2x8-10 2#  Sidelying hip abduction 2x10 (RTB)  Bridges with clams with RTB around knees : 2x10 5" hold  Bridge with Hip ABD x10  Sit to stand from gold chair with 4# medicine ball press 2x10  Lateral walking with YTB around ankles 2 laps at mirrors  Lateral step downs on aerobic step 2x10  SLS with lateral sliders 2x10  SLS 30" each  Leg Press Machine 2x15 20#  Seated HS Cable Curl " "2x15 7#        Not Performed:  Shuttle, double squats 75#: 3 minutes  Seated hamstring stretch: 2x30"  Gastrocnemius stretch at wall: 2x30"  Step up 6in x15  SAQ with isometric hip adduction: 2x10  TKE, GTB: 20x /each     manual therapy techniques: Joint mobilizations and Soft tissue Mobilization were applied to the: bilateral knees for 10 minutes, including:  Patella mobilization  Tibiofemoral joint distraction with patient seated  Tibiofemoral P/A and A/P Grade I-IV  Ant Tilt of Tibia Grade I-IV (patient reports a reduction in perceived tightness with technique)  Contract-Relax PROM to patient tolerance Flex/Ext        Patient Education and Home Exercises      Home Exercises Provided and Patient Education Provided      Education provided:   - HEP     Written Home Exercises Provided: Patient instructed to cont prior HEP. Exercises were reviewed and Marsha was able to demonstrate them prior to the end of the session.  Marsha demonstrated good  understanding of the education provided. See EMR under Patient Instructions for exercises provided during therapy sessions     ASSESSMENT      Marsha was able to tolerate today's treatment without increase in pain, she continues o fatigue quickly with knee/quad dominant activities but is able to complete desired programming with sustained rest breaks for recovery. Overall patient seems to be progressing well and will benefit from continued time spent with strengthening for improved stability during ambulation.      Marsha Is progressing well towards her goals.   Pt prognosis is Good.      Pt will continue to benefit from skilled outpatient physical therapy to address the deficits listed in the problem list box on initial evaluation, provide pt/family education and to maximize pt's level of independence in the home and community environment.      Pt's spiritual, cultural and educational needs considered and pt agreeable to plan of care and goals.     Anticipated Barriers for " therapy: scheduling      Goals:  Short Term Goals: 3 weeks   The patient will be independent with initial home exercise program to facilitate carryover between treatment sessions. Met  2.   The patient will achieve 0 degrees of knee extension AROM in 3 weeks in order to walk without limping of hip compensation.   3.   The patient will demonstrate improved quad strength and motor control as noted by ability to perform SLR without lag in order to progress into advanced there ex.   4.   The patient will be able to ambulate 15 minutes with </= 2/10 knee pain at worse.        Long Term Goals: 10 weeks   1. The patient will demonstrate an increase in quadriceps strength by 1 MMT grade in order to allow the ability to ascend and descend stairs without knee buckling.   2. The patient will demonstrate improved glute strength by 1 MMT grade in order to allow the ability to perform single leg activities without hip compensation or hip drop.  3. The patient will walk for 30 minutes without knee pain in order to perform tasks of shopping for groceries and navigating the supermarket.   4.  Patient will improve FOTO score to </= 39% limited to decrease perceived limitation with mobility.     PLAN      Continue to progress per patient tolerance.       Alphonso Washington, PT

## 2022-11-03 ENCOUNTER — CLINICAL SUPPORT (OUTPATIENT)
Dept: REHABILITATION | Facility: HOSPITAL | Age: 77
End: 2022-11-03
Payer: MEDICARE

## 2022-11-03 DIAGNOSIS — M62.81 QUADRICEPS WEAKNESS: ICD-10-CM

## 2022-11-03 DIAGNOSIS — M25.661 DECREASED RANGE OF MOTION (ROM) OF BOTH KNEES: ICD-10-CM

## 2022-11-03 DIAGNOSIS — Z74.09 IMPAIRED FUNCTIONAL MOBILITY AND ACTIVITY TOLERANCE: Primary | ICD-10-CM

## 2022-11-03 DIAGNOSIS — M25.662 DECREASED RANGE OF MOTION (ROM) OF BOTH KNEES: ICD-10-CM

## 2022-11-03 PROCEDURE — 97140 MANUAL THERAPY 1/> REGIONS: CPT | Mod: HCNC,PO,97

## 2022-11-03 PROCEDURE — 97110 THERAPEUTIC EXERCISES: CPT | Mod: HCNC,PO,97

## 2022-11-03 NOTE — PROGRESS NOTES
"[facility]   Physical Therapy Treatment Note     Name: Marsha Lopez [place]  Clinic Number: [id number]    Therapy Diagnosis:   Encounter Diagnoses   Name Primary?    Impaired functional mobility and activity tolerance Yes    Decreased range of motion (ROM) of both knees     Quadriceps weakness      Physician: Marcos Sevilla MD    Visit Date: 11/3/2022    Physician Orders: PT Eval and Treat   Medical Diagnosis from Referral: M17.0 (ICD-10-CM) - Bilateral primary osteoarthritis of knee   Evaluation Date: 8/19/2022  Authorization Period Expiration: 08/19/2023  Plan of Care Expiration: 12/31/2022  Next Progress Note Due: 11/25/2022  Visit # / Visits authorized: 8/ 20   FOTO: 1/3     Precautions: Standard      PTA Visit #: 0/5      Time In: 1145 pm  Time Out: 1235 pm  Total Billable Time: 50 minutes     SUBJECTIVE      Pt reports: patient reports she was sore after last visit but otherwise has no new complaints and soreness has since subsided. Patient is agreeable to today's visit.      She was compliant with home exercise program.  Response to previous treatment: muscle soreness  Functional change: decreased knee pain     Pain: 1-2/10  Location: bilateral knees (L>R)       Patients goals: she would like to be able to walk >/= 30 minutes prior to onset of knee pain, walk without an assistive device      OBJECTIVE       To be updated at progress note unless specified otherwise      Treatment      Marsha received the treatments listed below:       therapeutic exercises to develop strength, endurance and flexibility for  40 minutes including:  Recumbent bike 7' level 4  LAQ : 2x15 2#  SAQ 2x15 5#  Supine SLR 2x8-10 2#  Sidelying hip abduction 2x10 (GTB)  Bridges with clams with RTB around knees : 2x15 5" hold  Bridge with Hip ABD x10  Sit to stand from gold chair with 4# medicine ball press 2x10  Lateral walking with YTB around ankles 2 laps at mirrors  Lateral step downs on aerobic step " "2x10  SLS with lateral sliders 2x10  SLS 30" each  Leg Press Machine 2x15 20#  Seated HS Cable Curl 2x15 7#    SLS Stance on Foam 2x30" ea  Tandem Stance on Foam with Head Rotation 3x30"ea        Not Performed:  Shuttle, double squats 75#: 3 minutes  Seated hamstring stretch: 2x30"  Gastrocnemius stretch at wall: 2x30"  Step up 6in x15  SAQ with isometric hip adduction: 2x10  TKE, GTB: 20x /each     manual therapy techniques: Joint mobilizations and Soft tissue Mobilization were applied to the: bilateral knees for 10 minutes, including:  Patella mobilization  Tibiofemoral joint distraction with patient seated  Tibiofemoral P/A and A/P Grade I-IV  Ant Tilt of Tibia Grade I-IV (patient reports a reduction in perceived tightness with technique)  Contract-Relax PROM to patient tolerance Flex/Ext        Patient Education and Home Exercises      Home Exercises Provided and Patient Education Provided      Education provided:   - HEP     Written Home Exercises Provided: Patient instructed to cont prior HEP. Exercises were reviewed and Marsha was able to demonstrate them prior to the end of the session.  Marsha demonstrated good  understanding of the education provided. See EMR under Patient Instructions for exercises provided during therapy sessions     ASSESSMENT      Marsha continues to tolerate progressive strengthening well without sxs exacerbation. At end of visit she reports no pain in BLE and states she has a tolerable amount of fatigue. She initiated static balance training and was able to complete task with CGA and min LOB. Overall patient is progressing nicely and will benefit from continued skilled PT.     Marsha Is progressing well towards her goals.   Pt prognosis is Good.      Pt will continue to benefit from skilled outpatient physical therapy to address the deficits listed in the problem list box on initial evaluation, provide pt/family education and to maximize pt's level of independence in the home and " community environment.      Pt's spiritual, cultural and educational needs considered and pt agreeable to plan of care and goals.     Anticipated Barriers for therapy: scheduling      Goals:  Short Term Goals: 3 weeks   The patient will be independent with initial home exercise program to facilitate carryover between treatment sessions. Met  2.   The patient will achieve 0 degrees of knee extension AROM in 3 weeks in order to walk without limping of hip compensation.   3.   The patient will demonstrate improved quad strength and motor control as noted by ability to perform SLR without lag in order to progress into advanced there ex.   4.   The patient will be able to ambulate 15 minutes with </= 2/10 knee pain at worse.        Long Term Goals: 10 weeks   1. The patient will demonstrate an increase in quadriceps strength by 1 MMT grade in order to allow the ability to ascend and descend stairs without knee buckling.   2. The patient will demonstrate improved glute strength by 1 MMT grade in order to allow the ability to perform single leg activities without hip compensation or hip drop.  3. The patient will walk for 30 minutes without knee pain in order to perform tasks of shopping for groceries and navigating the supermarket.   4.  Patient will improve FOTO score to </= 39% limited to decrease perceived limitation with mobility.     PLAN      Continue to progress per patient tolerance.     Alphonso Washington, PT

## 2022-11-08 ENCOUNTER — CLINICAL SUPPORT (OUTPATIENT)
Dept: REHABILITATION | Facility: HOSPITAL | Age: 77
End: 2022-11-08
Payer: COMMERCIAL

## 2022-11-08 DIAGNOSIS — M62.81 QUADRICEPS WEAKNESS: ICD-10-CM

## 2022-11-08 DIAGNOSIS — M25.661 DECREASED RANGE OF MOTION (ROM) OF BOTH KNEES: ICD-10-CM

## 2022-11-08 DIAGNOSIS — Z74.09 IMPAIRED FUNCTIONAL MOBILITY AND ACTIVITY TOLERANCE: Primary | ICD-10-CM

## 2022-11-08 DIAGNOSIS — M25.662 DECREASED RANGE OF MOTION (ROM) OF BOTH KNEES: ICD-10-CM

## 2022-11-08 PROCEDURE — 97110 THERAPEUTIC EXERCISES: CPT | Mod: HCNC,PO,97

## 2022-11-09 NOTE — PROGRESS NOTES
"[facility]   Physical Therapy Treatment Note     Name: Marsha Lopez [place]  Clinic Number: [id number]    Therapy Diagnosis:   Encounter Diagnoses   Name Primary?    Impaired functional mobility and activity tolerance Yes    Decreased range of motion (ROM) of both knees     Quadriceps weakness      Physician: Marcos Sevilla MD    Visit Date: 11/8/2022    Physician Orders: PT Eval and Treat   Medical Diagnosis from Referral: M17.0 (ICD-10-CM) - Bilateral primary osteoarthritis of knee   Evaluation Date: 8/19/2022  Authorization Period Expiration: 08/19/2023  Plan of Care Expiration: 12/31/2022  Next Progress Note Due: 11/25/2022  Visit # / Visits authorized: 8/ 20   FOTO: 1/3     Precautions: Standard      PTA Visit #: 0/5      Time In: 1245 pm  Time Out: 115 pm  Total Billable Time: 30 minutes     SUBJECTIVE      Pt reports: patient reports she was sore after last visit but otherwise has no new complaints and soreness has since subsided. Patient is agreeable to today's visit.      She was compliant with home exercise program.  Response to previous treatment: muscle soreness  Functional change: decreased knee pain     Pain: 1/10  Location: bilateral knees (L>R)       Patients goals: she would like to be able to walk >/= 30 minutes prior to onset of knee pain, walk without an assistive device      OBJECTIVE       To be updated at progress note unless specified otherwise      Treatment      Marsha received the treatments listed below:       therapeutic exercises to develop strength, endurance and flexibility for  40 minutes including:  Recumbent bike 7' level 4  LAQ : 2x15 2#  SAQ 2x15 5#  Supine SLR 2x8-10 2#  Sidelying hip abduction 2x10 (GTB)  Bridges with clams with RTB around knees : 2x15 5" hold  Bridge with Hip ABD x10  Sit to stand from gold chair with 4# medicine ball press 2x10  Lateral walking with YTB around ankles 2 laps at mirrors  Lateral step downs on aerobic step 2x10  SLS " "with lateral sliders 2x10  SLS 30" each  Leg Press Machine 3x10 20#  Seated HS Cable Curl 2x15 7#  Knee Ext Machine xAMRAP 2.5#     SLS Stance on Foam 2x30" ea  Tandem Stance on Foam with Head Rotation 3x30"ea        Not Performed:  Shuttle, double squats 75#: 3 minutes  Seated hamstring stretch: 2x30"  Gastrocnemius stretch at wall: 2x30"  Step up 6in x15  SAQ with isometric hip adduction: 2x10  TKE, GTB: 20x /each     manual therapy techniques: Joint mobilizations and Soft tissue Mobilization were applied to the: bilateral knees for 00 minutes, including:  Patella mobilization  Tibiofemoral joint distraction with patient seated  Tibiofemoral P/A and A/P Grade I-IV  Ant Tilt of Tibia Grade I-IV (patient reports a reduction in perceived tightness with technique)  Contract-Relax PROM to patient tolerance Flex/Ext        Patient Education and Home Exercises      Home Exercises Provided and Patient Education Provided      Education provided:   - HEP     Written Home Exercises Provided: Patient instructed to cont prior HEP. Exercises were reviewed and Marsha was able to demonstrate them prior to the end of the session.  Marsha demonstrated good  understanding of the education provided. See EMR under Patient Instructions for exercises provided during therapy sessions     ASSESSMENT      Marsha continues to tolerate progressive strengthening well without sxs exacerbation. She continues to tolerate progressive overload without increase in pain and is beginning to display increased generalized activity tolerance and increase standing tolerance. Overall patient is progressing nicely and will benefit from completion of POC.       Marsha Is progressing well towards her goals.   Pt prognosis is Good.      Pt will continue to benefit from skilled outpatient physical therapy to address the deficits listed in the problem list box on initial evaluation, provide pt/family education and to maximize pt's level of independence in the " home and community environment.      Pt's spiritual, cultural and educational needs considered and pt agreeable to plan of care and goals.     Anticipated Barriers for therapy: scheduling      Goals:  Short Term Goals: 3 weeks   The patient will be independent with initial home exercise program to facilitate carryover between treatment sessions. Met  2.   The patient will achieve 0 degrees of knee extension AROM in 3 weeks in order to walk without limping of hip compensation.   3.   The patient will demonstrate improved quad strength and motor control as noted by ability to perform SLR without lag in order to progress into advanced there ex.   4.   The patient will be able to ambulate 15 minutes with </= 2/10 knee pain at worse.        Long Term Goals: 10 weeks   1. The patient will demonstrate an increase in quadriceps strength by 1 MMT grade in order to allow the ability to ascend and descend stairs without knee buckling.   2. The patient will demonstrate improved glute strength by 1 MMT grade in order to allow the ability to perform single leg activities without hip compensation or hip drop.  3. The patient will walk for 30 minutes without knee pain in order to perform tasks of shopping for groceries and navigating the supermarket.   4.  Patient will improve FOTO score to </= 39% limited to decrease perceived limitation with mobility.     PLAN      Continue to progress per patient tolerance.     Alphonso Washington, PT

## 2022-11-10 ENCOUNTER — CLINICAL SUPPORT (OUTPATIENT)
Dept: REHABILITATION | Facility: HOSPITAL | Age: 77
End: 2022-11-10
Payer: MEDICARE

## 2022-11-10 DIAGNOSIS — M25.662 DECREASED RANGE OF MOTION (ROM) OF BOTH KNEES: ICD-10-CM

## 2022-11-10 DIAGNOSIS — M25.661 DECREASED RANGE OF MOTION (ROM) OF BOTH KNEES: ICD-10-CM

## 2022-11-10 DIAGNOSIS — Z74.09 IMPAIRED FUNCTIONAL MOBILITY AND ACTIVITY TOLERANCE: Primary | ICD-10-CM

## 2022-11-10 DIAGNOSIS — M62.81 QUADRICEPS WEAKNESS: ICD-10-CM

## 2022-11-10 PROCEDURE — 97110 THERAPEUTIC EXERCISES: CPT | Mod: HCNC,PO,CQ,97

## 2022-11-10 NOTE — PROGRESS NOTES
"[facility]   Physical Therapy Treatment Note     Name: Marsha Lopez [place]  Clinic Number: [id number]    Therapy Diagnosis:   Encounter Diagnoses   Name Primary?    Impaired functional mobility and activity tolerance Yes    Decreased range of motion (ROM) of both knees     Quadriceps weakness        Physician: Marcos Sevilla MD    Visit Date: 11/10/2022    Physician Orders: PT Eval and Treat   Medical Diagnosis from Referral: M17.0 (ICD-10-CM) - Bilateral primary osteoarthritis of knee   Evaluation Date: 8/19/2022  Authorization Period Expiration: 08/19/2023  Plan of Care Expiration: 12/31/2022  Next Progress Note Due: 11/25/2022  Visit # / Visits authorized: 11/ 20    FOTO: 1/3     Precautions: Standard      PTA Visit #: 1/5      Time In: 2:17 pm (late arrival)  Time Out: 2:45 pm  Total Billable Time: 28 minutes     SUBJECTIVE      Pt reports: she thought her appointment was earlier then when she came back she got caught in traffic and that's why she was late     She was compliant with home exercise program.  Response to previous treatment: muscle soreness  Functional change: decreased knee pain     Pain: 1/10  Location: bilateral knees (L>R)       Patients goals: she would like to be able to walk >/= 30 minutes prior to onset of knee pain, walk without an assistive device      OBJECTIVE       To be updated at progress note unless specified otherwise      Treatment      Marsha received the treatments listed below:       therapeutic exercises to develop strength, endurance and flexibility for  28 minutes including:  Recumbent bike 7' level 4  LAQ : 2x15 2#  SAQ 2x15 5#  Supine SLR 2x8-10 2#  Sidelying hip abduction 2x10 (GTB)  Bridges with clams with RTB around knees : 2x15 5" hold  Bridge with Hip ABD x10  Sit to stand from gold chair with 4# medicine ball press 2x10  Lateral walking with YTB around ankles 2 laps at mirrors  Lateral step downs on aerobic step 2x10  SLS with lateral " "sliders 2x10  SLS 30" each  Leg Press Machine 3x10 20#  Seated HS Cable Curl 2x15 7#  Knee Ext Machine xAMRAP 2.5#     SLS Stance on Foam 2x30" ea  Tandem Stance on Foam with Head Rotation 3x30"ea        Not Performed:  Shuttle, double squats 75#: 3 minutes  Seated hamstring stretch: 2x30"  Gastrocnemius stretch at wall: 2x30"  Step up 6in x15  SAQ with isometric hip adduction: 2x10  TKE, GTB: 20x /each     manual therapy techniques: Joint mobilizations and Soft tissue Mobilization were applied to the: bilateral knees for 00 minutes, including:  Patella mobilization  Tibiofemoral joint distraction with patient seated  Tibiofemoral P/A and A/P Grade I-IV  Ant Tilt of Tibia Grade I-IV (patient reports a reduction in perceived tightness with technique)  Contract-Relax PROM to patient tolerance Flex/Ext        Patient Education and Home Exercises      Home Exercises Provided and Patient Education Provided      Education provided:   - HEP     Written Home Exercises Provided: Patient instructed to cont prior HEP. Exercises were reviewed and Marsha was able to demonstrate them prior to the end of the session.  Marsha demonstrated good  understanding of the education provided. See EMR under Patient Instructions for exercises provided during therapy sessions     ASSESSMENT      Marsha presents to treatment late today, so treatment session was shortened. Good tolerance to exercises with appropriate response. She is challenged by balance activities today with decreased ability to perform single leg stance on right and tandem leading with left. Continue to progress as tolerated.      Marsha Is progressing well towards her goals.   Pt prognosis is Good.      Pt will continue to benefit from skilled outpatient physical therapy to address the deficits listed in the problem list box on initial evaluation, provide pt/family education and to maximize pt's level of independence in the home and community environment.      Pt's " spiritual, cultural and educational needs considered and pt agreeable to plan of care and goals.     Anticipated Barriers for therapy: scheduling      Goals:  Short Term Goals: 3 weeks   The patient will be independent with initial home exercise program to facilitate carryover between treatment sessions. Met  2.   The patient will achieve 0 degrees of knee extension AROM in 3 weeks in order to walk without limping of hip compensation.   3.   The patient will demonstrate improved quad strength and motor control as noted by ability to perform SLR without lag in order to progress into advanced there ex.   4.   The patient will be able to ambulate 15 minutes with </= 2/10 knee pain at worse.        Long Term Goals: 10 weeks   1. The patient will demonstrate an increase in quadriceps strength by 1 MMT grade in order to allow the ability to ascend and descend stairs without knee buckling.   2. The patient will demonstrate improved glute strength by 1 MMT grade in order to allow the ability to perform single leg activities without hip compensation or hip drop.  3. The patient will walk for 30 minutes without knee pain in order to perform tasks of shopping for groceries and navigating the supermarket.   4.  Patient will improve FOTO score to </= 39% limited to decrease perceived limitation with mobility.     PLAN      Continue to progress per patient tolerance.     Suzette Villanueva, PTA

## 2022-11-15 ENCOUNTER — CLINICAL SUPPORT (OUTPATIENT)
Dept: REHABILITATION | Facility: HOSPITAL | Age: 77
End: 2022-11-15
Payer: COMMERCIAL

## 2022-11-15 DIAGNOSIS — M25.661 DECREASED RANGE OF MOTION (ROM) OF BOTH KNEES: ICD-10-CM

## 2022-11-15 DIAGNOSIS — M25.662 DECREASED RANGE OF MOTION (ROM) OF BOTH KNEES: ICD-10-CM

## 2022-11-15 DIAGNOSIS — M62.81 QUADRICEPS WEAKNESS: ICD-10-CM

## 2022-11-15 DIAGNOSIS — Z74.09 IMPAIRED FUNCTIONAL MOBILITY AND ACTIVITY TOLERANCE: Primary | ICD-10-CM

## 2022-11-15 PROCEDURE — 97110 THERAPEUTIC EXERCISES: CPT | Mod: HCNC,PO,CQ,97

## 2022-11-15 NOTE — PROGRESS NOTES
"[facility]   Physical Therapy Treatment Note     Name: Marsha Lopez [place]  Clinic Number: [id number]    Therapy Diagnosis:   Encounter Diagnoses   Name Primary?    Impaired functional mobility and activity tolerance Yes    Decreased range of motion (ROM) of both knees     Quadriceps weakness          Physician: Marcos Sevilla MD    Visit Date: 11/15/2022    Physician Orders: PT Eval and Treat   Medical Diagnosis from Referral: M17.0 (ICD-10-CM) - Bilateral primary osteoarthritis of knee   Evaluation Date: 8/19/2022  Authorization Period Expiration: 08/19/2023  Plan of Care Expiration: 12/31/2022  Next Progress Note Due: 11/25/2022  Visit # / Visits authorized: 12/ 20    FOTO: 1/3     Precautions: Standard      PTA Visit #: 2/5      Time In: 1:18 pm  Time Out: 1:58 pm  Total Billable Time: 40 minutes     SUBJECTIVE      Pt reports: "this cold weather has me hurting today"     She was compliant with home exercise program.  Response to previous treatment: muscle soreness  Functional change: decreased knee pain     Pain: 3/10  Location: bilateral knees (L>R)       Patients goals: she would like to be able to walk >/= 30 minutes prior to onset of knee pain, walk without an assistive device      OBJECTIVE       To be updated at progress note unless specified otherwise      Treatment      Marsha received the treatments listed below:       therapeutic exercises to develop strength, endurance and flexibility for  40 minutes including:  Recumbent bike 7' level 3  LAQ : 2x15 2#  SAQ 2x15 5#  Supine SLR 2x8-10 2#  Sidelying hip abduction 2x10 (GTB)  Bridges with GTB : 2x10 5" hold  Bridge with Hip ABD x10  Sit to stand from gold chair with 4# medicine ball press 2x10  Lateral walking with YTB around ankles 2 laps at mirrors  Lateral step downs on aerobic step 2x10  SLS with lateral sliders 2x10  SLS 30" each  Leg Press Machine 3x10 20#  Seated HS Cable Curl 2x15 7#  Knee Ext Machine xAMRAP 2.5#   " "  SLS Stance on Foam 2x30" ea  Tandem Stance on Foam with Head Rotation 3x30"ea        Not Performed:  Shuttle, double squats 75#: 3 minutes  Seated hamstring stretch: 2x30"  Gastrocnemius stretch at wall: 2x30"  Step up 6in x15  SAQ with isometric hip adduction: 2x10  TKE, GTB: 20x /each     manual therapy techniques: Joint mobilizations and Soft tissue Mobilization were applied to the: bilateral knees for 00 minutes, including:  Patella mobilization  Tibiofemoral joint distraction with patient seated  Tibiofemoral P/A and A/P Grade I-IV  Ant Tilt of Tibia Grade I-IV (patient reports a reduction in perceived tightness with technique)  Contract-Relax PROM to patient tolerance Flex/Ext        Patient Education and Home Exercises      Home Exercises Provided and Patient Education Provided      Education provided:   - HEP     Written Home Exercises Provided: Patient instructed to cont prior HEP. Exercises were reviewed and Marsha was able to demonstrate them prior to the end of the session.  Marsha demonstrated good  understanding of the education provided. See EMR under Patient Instructions for exercises provided during therapy sessions     ASSESSMENT      Marsha presents to treatment with increased pain in bilateral knees due to colder weather. She reports the left knee continues to be worse than the right knee. Good tolerance to exercises with mild discomfort noted. Increased difficulty maintaining single leg stance on airex today. Continue to progress as tolerated.      Marsha Is progressing well towards her goals.   Pt prognosis is Good.      Pt will continue to benefit from skilled outpatient physical therapy to address the deficits listed in the problem list box on initial evaluation, provide pt/family education and to maximize pt's level of independence in the home and community environment.      Pt's spiritual, cultural and educational needs considered and pt agreeable to plan of care and goals.   "   Anticipated Barriers for therapy: scheduling      Goals:  Short Term Goals: 3 weeks   The patient will be independent with initial home exercise program to facilitate carryover between treatment sessions. Met  2.   The patient will achieve 0 degrees of knee extension AROM in 3 weeks in order to walk without limping of hip compensation.   3.   The patient will demonstrate improved quad strength and motor control as noted by ability to perform SLR without lag in order to progress into advanced there ex.   4.   The patient will be able to ambulate 15 minutes with </= 2/10 knee pain at worse.        Long Term Goals: 10 weeks   1. The patient will demonstrate an increase in quadriceps strength by 1 MMT grade in order to allow the ability to ascend and descend stairs without knee buckling.   2. The patient will demonstrate improved glute strength by 1 MMT grade in order to allow the ability to perform single leg activities without hip compensation or hip drop.  3. The patient will walk for 30 minutes without knee pain in order to perform tasks of shopping for groceries and navigating the supermarket.   4.  Patient will improve FOTO score to </= 39% limited to decrease perceived limitation with mobility.     PLAN      Continue to progress per patient tolerance.     Suzette Villanueva, PTA

## 2022-11-16 ENCOUNTER — TELEPHONE (OUTPATIENT)
Dept: FAMILY MEDICINE | Facility: CLINIC | Age: 77
End: 2022-11-16
Payer: MEDICARE

## 2022-11-16 NOTE — TELEPHONE ENCOUNTER
Patient does not need to see family medicine, but she did receive a phone call to set an appointment to follow-up with endocrinology.

## 2022-11-16 NOTE — TELEPHONE ENCOUNTER
----- Message from Arline Vela sent at 11/16/2022  3:56 PM CST -----  Type: Patient Call Back        Who called: self         What is the request in detail: Pt is asking for a sooner appt than March for her annual follow up  ..         Can the clinic reply by [facility]? No         Would the patient rather a call back or a response via [facility]? Yes         Best call back number: [phone] (home)                   Thank You

## 2022-11-17 ENCOUNTER — CLINICAL SUPPORT (OUTPATIENT)
Dept: REHABILITATION | Facility: HOSPITAL | Age: 77
End: 2022-11-17
Payer: COMMERCIAL

## 2022-11-17 DIAGNOSIS — M25.662 DECREASED RANGE OF MOTION (ROM) OF BOTH KNEES: ICD-10-CM

## 2022-11-17 DIAGNOSIS — M62.81 QUADRICEPS WEAKNESS: ICD-10-CM

## 2022-11-17 DIAGNOSIS — Z74.09 IMPAIRED FUNCTIONAL MOBILITY AND ACTIVITY TOLERANCE: Primary | ICD-10-CM

## 2022-11-17 DIAGNOSIS — M25.661 DECREASED RANGE OF MOTION (ROM) OF BOTH KNEES: ICD-10-CM

## 2022-11-17 PROCEDURE — 97110 THERAPEUTIC EXERCISES: CPT | Mod: HCNC,PO,97

## 2022-11-17 PROCEDURE — 97140 MANUAL THERAPY 1/> REGIONS: CPT | Mod: HCNC,PO,97

## 2022-11-17 NOTE — PROGRESS NOTES
"[facility]   Physical Therapy Treatment Note     Name: Marsha Lopez [place]  Clinic Number: [id number]    Therapy Diagnosis:   Encounter Diagnoses   Name Primary?    Impaired functional mobility and activity tolerance Yes    Decreased range of motion (ROM) of both knees     Quadriceps weakness      Physician: Marcos Sevilla MD    Visit Date: 11/17/2022    Physician Orders: PT Eval and Treat   Medical Diagnosis from Referral: M17.0 (ICD-10-CM) - Bilateral primary osteoarthritis of knee   Evaluation Date: 8/19/2022  Authorization Period Expiration: 08/19/2023  Plan of Care Expiration: 12/31/2022  Next Progress Note Due: 11/25/2022  Visit # / Visits authorized: 12/ 20    FOTO: 1/3     Precautions: Standard      PTA Visit #: 2/5      Time In: 1:05 pm  Time Out: 1:58 pm  Total Billable Time: 53 minutes     SUBJECTIVE      Pt reports: she continues to have increased stiffness and soreness with the recent change in weather. Patient is agreeable to today's treatment.      She was compliant with home exercise program.  Response to previous treatment: muscle soreness  Functional change: decreased knee pain     Pain: 3/10  Location: bilateral knees (L>R)       Patients goals: she would like to be able to walk >/= 30 minutes prior to onset of knee pain, walk without an assistive device      OBJECTIVE       To be updated at progress note unless specified otherwise      Treatment      Marsha received the treatments listed below:       therapeutic exercises to develop strength, endurance and flexibility for  43 minutes including:  Recumbent bike 7' level 3  LAQ : 2x15 2#  SAQ 2x15 5#  Supine SLR 2x10 2#  Sidelying hip abduction 2x10 2#  Bridges with GTB : 3x10 5" hold  Bridge with Hip ABD x10  Sit to stand from gold chair with 4# medicine ball press 2x10  Lateral walking with YTB around ankles 2 laps at mirrors  Lateral step downs on aerobic step 2x10  SLS with lateral sliders 2x10  SLS 30" each  Leg " "Press Machine 2x10 40#  Seated HS Cable Curl 2x15 7#  Knee Ext Machine xAMRAP 2.5#     SLS Stance on Foam 2x45" ea  Tandem Stance on Foam with Con Reaction Drill 3x30"ea        Not Performed:  Shuttle, double squats 75#: 3 minutes  Seated hamstring stretch: 2x30"  Gastrocnemius stretch at wall: 2x30"  Step up 6in x15  SAQ with isometric hip adduction: 2x10  TKE, GTB: 20x /each     manual therapy techniques: Joint mobilizations and Soft tissue Mobilization were applied to the: bilateral knees for 10 minutes, including:  Patella mobilization  Tibiofemoral joint distraction with patient seated  Tibiofemoral P/A and A/P Grade I-IV  Ant Tilt of Tibia Grade I-IV (patient reports a reduction in perceived tightness with technique)  Contract-Relax PROM to patient tolerance Flex/Ext        Patient Education and Home Exercises      Home Exercises Provided and Patient Education Provided      Education provided:   - HEP     Written Home Exercises Provided: Patient instructed to cont prior HEP. Exercises were reviewed and Marsha was able to demonstrate them prior to the end of the session.  Marsha demonstrated good  understanding of the education provided. See EMR under Patient Instructions for exercises provided during therapy sessions     ASSESSMENT      Marsha continues to tolerate progressive strengthening and SL balance training well without increase in pain. She continues to report positive response to MT intervention reporting a reduction in pain and post-treatment displays improved ROM. She will benefit from continued skilled PT.  Patient reports no pain and had no further questions at the end of treatment today.      Marsha Is progressing well towards her goals.   Pt prognosis is Good.      Pt will continue to benefit from skilled outpatient physical therapy to address the deficits listed in the problem list box on initial evaluation, provide pt/family education and to maximize pt's level of independence in the home and " community environment.      Pt's spiritual, cultural and educational needs considered and pt agreeable to plan of care and goals.     Anticipated Barriers for therapy: scheduling      Goals:  Short Term Goals: 3 weeks   The patient will be independent with initial home exercise program to facilitate carryover between treatment sessions. Met  2.   The patient will achieve 0 degrees of knee extension AROM in 3 weeks in order to walk without limping of hip compensation.   3.   The patient will demonstrate improved quad strength and motor control as noted by ability to perform SLR without lag in order to progress into advanced there ex.   4.   The patient will be able to ambulate 15 minutes with </= 2/10 knee pain at worse.        Long Term Goals: 10 weeks   1. The patient will demonstrate an increase in quadriceps strength by 1 MMT grade in order to allow the ability to ascend and descend stairs without knee buckling.   2. The patient will demonstrate improved glute strength by 1 MMT grade in order to allow the ability to perform single leg activities without hip compensation or hip drop.  3. The patient will walk for 30 minutes without knee pain in order to perform tasks of shopping for groceries and navigating the supermarket.   4.  Patient will improve FOTO score to </= 39% limited to decrease perceived limitation with mobility.     PLAN      Continue to progress per patient tolerance.     Alphonso Washington, PT

## 2022-11-21 ENCOUNTER — CLINICAL SUPPORT (OUTPATIENT)
Dept: REHABILITATION | Facility: HOSPITAL | Age: 77
End: 2022-11-21
Payer: MEDICARE

## 2022-11-21 DIAGNOSIS — M25.662 DECREASED RANGE OF MOTION (ROM) OF BOTH KNEES: ICD-10-CM

## 2022-11-21 DIAGNOSIS — M25.661 DECREASED RANGE OF MOTION (ROM) OF BOTH KNEES: ICD-10-CM

## 2022-11-21 DIAGNOSIS — Z74.09 IMPAIRED FUNCTIONAL MOBILITY AND ACTIVITY TOLERANCE: Primary | ICD-10-CM

## 2022-11-21 DIAGNOSIS — M62.81 QUADRICEPS WEAKNESS: ICD-10-CM

## 2022-11-21 PROCEDURE — 97110 THERAPEUTIC EXERCISES: CPT | Mod: HCNC,PO,97

## 2022-11-21 NOTE — PROGRESS NOTES
"[facility]   Physical Therapy Treatment Note     Name: Marsha Lopez [place]  Clinic Number: [id number]    Therapy Diagnosis:   Encounter Diagnoses   Name Primary?    Impaired functional mobility and activity tolerance Yes    Decreased range of motion (ROM) of both knees     Quadriceps weakness      Physician: Marcos Sevilla MD    Visit Date: 11/21/2022    Physician Orders: PT Eval and Treat   Medical Diagnosis from Referral: M17.0 (ICD-10-CM) - Bilateral primary osteoarthritis of knee   Evaluation Date: 8/19/2022  Authorization Period Expiration: 08/19/2023  Plan of Care Expiration: 12/31/2022  Next Progress Note Due: 11/25/2022  Visit # / Visits authorized: 14/ 20    FOTO: 1/3     Precautions: Standard      PTA Visit #: 2/5      Time In: 130 pm  Time Out: 215 pm  Total Billable Time: 45 minutes     SUBJECTIVE      Pt reports: sshe has no new complaints and overall feels she is progressing in the right direction. Patient is agreeable to today's treatment.      She was compliant with home exercise program.  Response to previous treatment: muscle soreness  Functional change: decreased knee pain     Pain: 1/10  Location: bilateral knees (L>R)       Patients goals: she would like to be able to walk >/= 30 minutes prior to onset of knee pain, walk without an assistive device      OBJECTIVE       To be updated at progress note unless specified otherwise      Treatment      Marsha received the treatments listed below:       therapeutic exercises to develop strength, endurance and flexibility for  43 minutes including:  Recumbent bike 7' level 3  LAQ : 2x15 2#  SAQ 90sec ea 3#  Supine SLR 12x3"3  Sidelying hip abduction x12ea 3#  Bridges with GTB : 3x10 5" hold  Bridge with Hip ABD x10  Sit to stand from gold chair with 4# medicine ball press 2x10  Lateral walking with YTB around ankles 2 laps at mirrors  Lateral step downs on aerobic step 2x10  SLS with lateral sliders 2x10  SLS 30" each  Leg " "Press Machine 1x10 20#; 2x10 40#  Seated HS Cable Curl 2x15 7#  Knee Ext Machine xAMRAP 2.5#     SLS Stance on Foam 2x45" ea- next visit  Tandem Stance on Foam with Con Reaction Drill 3x30"ea- next visit        Not Performed:  Shuttle, double squats 75#: 3 minutes  Seated hamstring stretch: 2x30"  Gastrocnemius stretch at wall: 2x30"  Step up 6in x15  SAQ with isometric hip adduction: 2x10  TKE, GTB: 20x /each     manual therapy techniques: Joint mobilizations and Soft tissue Mobilization were applied to the: bilateral knees for 00 minutes, including:  Patella mobilization  Tibiofemoral joint distraction with patient seated  Tibiofemoral P/A and A/P Grade I-IV  Ant Tilt of Tibia Grade I-IV (patient reports a reduction in perceived tightness with technique)  Contract-Relax PROM to patient tolerance Flex/Ext        Patient Education and Home Exercises      Home Exercises Provided and Patient Education Provided      Education provided:   - HEP     Written Home Exercises Provided: Patient instructed to cont prior HEP. Exercises were reviewed and Marsha was able to demonstrate them prior to the end of the session.  Marsha demonstrated good  understanding of the education provided. See EMR under Patient Instructions for exercises provided during therapy sessions     ASSESSMENT      Marsha continues progress nicely; she displays and verbalizes improved activity tolerance and improved capacity for transitional activities. Overall she displays good improvements in strength and will benefit from continues balance training to further reduce risk of another fall.      Marsha Is progressing well towards her goals.   Pt prognosis is Good.      Pt will continue to benefit from skilled outpatient physical therapy to address the deficits listed in the problem list box on initial evaluation, provide pt/family education and to maximize pt's level of independence in the home and community environment.      Pt's spiritual, cultural and " educational needs considered and pt agreeable to plan of care and goals.     Anticipated Barriers for therapy: scheduling      Goals:  Short Term Goals: 3 weeks   The patient will be independent with initial home exercise program to facilitate carryover between treatment sessions. Met  2.   The patient will achieve 0 degrees of knee extension AROM in 3 weeks in order to walk without limping of hip compensation.   3.   The patient will demonstrate improved quad strength and motor control as noted by ability to perform SLR without lag in order to progress into advanced there ex.   4.   The patient will be able to ambulate 15 minutes with </= 2/10 knee pain at worse.        Long Term Goals: 10 weeks   1. The patient will demonstrate an increase in quadriceps strength by 1 MMT grade in order to allow the ability to ascend and descend stairs without knee buckling.   2. The patient will demonstrate improved glute strength by 1 MMT grade in order to allow the ability to perform single leg activities without hip compensation or hip drop.  3. The patient will walk for 30 minutes without knee pain in order to perform tasks of shopping for groceries and navigating the supermarket.   4.  Patient will improve FOTO score to </= 39% limited to decrease perceived limitation with mobility.     PLAN      Continue to progress per patient tolerance.     Alphonso Washington, PT

## 2022-11-23 ENCOUNTER — CLINICAL SUPPORT (OUTPATIENT)
Dept: REHABILITATION | Facility: HOSPITAL | Age: 77
End: 2022-11-23
Attending: INTERNAL MEDICINE
Payer: MEDICARE

## 2022-11-23 DIAGNOSIS — M62.81 QUADRICEPS WEAKNESS: ICD-10-CM

## 2022-11-23 DIAGNOSIS — M17.0 BILATERAL PRIMARY OSTEOARTHRITIS OF KNEE: ICD-10-CM

## 2022-11-23 DIAGNOSIS — Z74.09 IMPAIRED FUNCTIONAL MOBILITY AND ACTIVITY TOLERANCE: Primary | ICD-10-CM

## 2022-11-23 DIAGNOSIS — M25.661 DECREASED RANGE OF MOTION (ROM) OF BOTH KNEES: ICD-10-CM

## 2022-11-23 DIAGNOSIS — I10 ESSENTIAL HYPERTENSION: ICD-10-CM

## 2022-11-23 DIAGNOSIS — M25.662 DECREASED RANGE OF MOTION (ROM) OF BOTH KNEES: ICD-10-CM

## 2022-11-23 PROCEDURE — 97110 THERAPEUTIC EXERCISES: CPT | Mod: HCNC,PO,97,CQ

## 2022-11-23 NOTE — TELEPHONE ENCOUNTER
----- Message from Jj Montague MA sent at 11/23/2022  9:10 AM CST -----  Type: Patient Call Back    Who Called: Human Margy Muniz    What is the request in detail:following up on medication request for ..     amLODIPine (NORVASC) 5 MG tablet  ibuprofen (ADVIL,MOTRIN) 600 MG tablet    Can the clinic reply by [facility]?No    Would the patient rather a call back or a response via [facility]? yes    Best call back number: [phone]    Fax Number: [phone]

## 2022-11-23 NOTE — TELEPHONE ENCOUNTER
No new care gaps identified.  [facility] Embedded Care Gaps. Reference number: [id number]. 11/23/2022   9:17:39 AM CST

## 2022-11-23 NOTE — PROGRESS NOTES
"[facility]   Physical Therapy Treatment Note     Name: Marsha Lopez [place]  Clinic Number: [id number]    Therapy Diagnosis:   Encounter Diagnoses   Name Primary?    Impaired functional mobility and activity tolerance Yes    Decreased range of motion (ROM) of both knees     Quadriceps weakness      Physician: Marcos Sevilla MD    Visit Date: 11/23/2022    Physician Orders: PT Eval and Treat   Medical Diagnosis from Referral: M17.0 (ICD-10-CM) - Bilateral primary osteoarthritis of knee   Evaluation Date: 8/19/2022  Authorization Period Expiration: 08/19/2023  Plan of Care Expiration: 12/31/2022  Next Progress Note Due: 11/25/2022  Visit # / Visits authorized: 15/ 20    FOTO: 1/3     Precautions: Standard      PTA Visit #: 1/5      Time In: 12:15 pm  Time Out: 1:00 pm  Total Billable Time: 45 minutes     SUBJECTIVE      Pt reports: her knees feel a little stiff this morning. She took an ibuprofen and that helps with the pain.     She was compliant with home exercise program.  Response to previous treatment: muscle soreness  Functional change: decreased knee pain     Pain: 1/10  Location: bilateral knees (L>R)    Patients goals: she would like to be able to walk >/= 30 minutes prior to onset of knee pain, walk without an assistive device      OBJECTIVE       To be updated at progress note unless specified otherwise      Treatment      Marsha received the treatments listed below:       therapeutic exercises to develop strength, endurance and flexibility for  45 minutes including:  Recumbent bike 7' level 3  LAQ : 2x15 2#  SAQ 90sec ea 3#  Supine SLR 12x3"3  Sidelying hip abduction 2x10ea 3#  Bridges with GTB : 3x10 5" hold  Bridge with Hip ABD x10  Sit to stand from gold chair with 4# medicine ball press 2x10  Lateral walking with YTB around ankles 2 laps at mirrors  Lateral step downs on aerobic step 2x10  SLS with lateral sliders 2x10  SLS 30" each  Leg Press Machine 1x10 20#; 3x10 " "40#  Seated HS Cable Curl 2x15 7#  Knee Ext Machine xAMRAP 2.5#     SLS Stance on Foam 2x45" ea- next visit  Tandem Stance on Foam with Con Reaction Drill 3x30"ea- next visit        Not Performed:  Shuttle, double squats 75#: 3 minutes  Seated hamstring stretch: 2x30"  Gastrocnemius stretch at wall: 2x30"  Step up 6in x15  SAQ with isometric hip adduction: 2x10  TKE, GTB: 20x /each     manual therapy techniques: Joint mobilizations and Soft tissue Mobilization were applied to the: bilateral knees for 00 minutes, including:  Patella mobilization  Tibiofemoral joint distraction with patient seated  Tibiofemoral P/A and A/P Grade I-IV  Ant Tilt of Tibia Grade I-IV (patient reports a reduction in perceived tightness with technique)  Contract-Relax PROM to patient tolerance Flex/Ext        Patient Education and Home Exercises      Home Exercises Provided and Patient Education Provided      Education provided:   - HEP     Written Home Exercises Provided: Patient instructed to cont prior HEP. Exercises were reviewed and Marsha was able to demonstrate them prior to the end of the session.  Marsha demonstrated good  understanding of the education provided. See EMR under Patient Instructions for exercises provided during therapy sessions     ASSESSMENT      Marsha with good tolerance to tx session. She performs all exercises with good carryover and proper form and muscle engagement with few verbal cues. Pt will benefit from continued balance training to further reduce risk of falls. Continue to monitor and progress as able.     Marsha Is progressing well towards her goals.   Pt prognosis is Good.      Pt will continue to benefit from skilled outpatient physical therapy to address the deficits listed in the problem list box on initial evaluation, provide pt/family education and to maximize pt's level of independence in the home and community environment.      Pt's spiritual, cultural and educational needs considered and pt " agreeable to plan of care and goals.     Anticipated Barriers for therapy: scheduling      Goals:  Short Term Goals: 3 weeks   The patient will be independent with initial home exercise program to facilitate carryover between treatment sessions. Met  2.   The patient will achieve 0 degrees of knee extension AROM in 3 weeks in order to walk without limping of hip compensation.   3.   The patient will demonstrate improved quad strength and motor control as noted by ability to perform SLR without lag in order to progress into advanced there ex.   4.   The patient will be able to ambulate 15 minutes with </= 2/10 knee pain at worse.        Long Term Goals: 10 weeks   1. The patient will demonstrate an increase in quadriceps strength by 1 MMT grade in order to allow the ability to ascend and descend stairs without knee buckling.   2. The patient will demonstrate improved glute strength by 1 MMT grade in order to allow the ability to perform single leg activities without hip compensation or hip drop.  3. The patient will walk for 30 minutes without knee pain in order to perform tasks of shopping for groceries and navigating the supermarket.   4.  Patient will improve FOTO score to </= 39% limited to decrease perceived limitation with mobility.     PLAN      Continue to progress per patient tolerance.     Sadia Pozo, PTA

## 2022-11-24 RX ORDER — AMLODIPINE BESYLATE 5 MG/1
5 TABLET ORAL DAILY
Qty: 90 TABLET | Refills: 3 | Status: SHIPPED | OUTPATIENT
Start: 2022-11-24 | End: 2023-11-24

## 2022-11-24 RX ORDER — IBUPROFEN 600 MG/1
600 TABLET ORAL EVERY 6 HOURS PRN
Qty: 20 TABLET | Refills: 0 | Status: SHIPPED | OUTPATIENT
Start: 2022-11-24

## 2022-11-29 ENCOUNTER — CLINICAL SUPPORT (OUTPATIENT)
Dept: REHABILITATION | Facility: HOSPITAL | Age: 77
End: 2022-11-29
Payer: MEDICARE

## 2022-11-29 DIAGNOSIS — Z74.09 IMPAIRED FUNCTIONAL MOBILITY AND ACTIVITY TOLERANCE: Primary | ICD-10-CM

## 2022-11-29 DIAGNOSIS — M25.662 DECREASED RANGE OF MOTION (ROM) OF BOTH KNEES: ICD-10-CM

## 2022-11-29 DIAGNOSIS — M62.81 QUADRICEPS WEAKNESS: ICD-10-CM

## 2022-11-29 DIAGNOSIS — M25.661 DECREASED RANGE OF MOTION (ROM) OF BOTH KNEES: ICD-10-CM

## 2022-11-29 PROCEDURE — 97140 MANUAL THERAPY 1/> REGIONS: CPT | Mod: HCNC,PO,CQ,97

## 2022-11-29 PROCEDURE — 97110 THERAPEUTIC EXERCISES: CPT | Mod: HCNC,PO,CQ

## 2022-11-29 NOTE — PROGRESS NOTES
"[facility]   Physical Therapy Treatment Note     Name: Marsha Lopez [place]  Clinic Number: [id number]    Therapy Diagnosis:   Encounter Diagnoses   Name Primary?    Impaired functional mobility and activity tolerance Yes    Decreased range of motion (ROM) of both knees     Quadriceps weakness        Physician: Marcos Sevilla MD    Visit Date: 11/29/2022    Physician Orders: PT Eval and Treat   Medical Diagnosis from Referral: M17.0 (ICD-10-CM) - Bilateral primary osteoarthritis of knee   Evaluation Date: 8/19/2022  Authorization Period Expiration: 08/19/2023  Plan of Care Expiration: 12/31/2022  Next Progress Note Due: 11/25/2022  Visit # / Visits authorized: 16/ 20    FOTO: 1/3     Precautions: Standard      PTA Visit #: 2/5      Time In: 1:15 pm  Time Out: 2:02 pm  Total Billable Time: 47 minutes     SUBJECTIVE      Pt reports: "they still paining me" she was referring to her knee pain     She was compliant with home exercise program.  Response to previous treatment: muscle soreness  Functional change: decreased knee pain     Pain: 5/10  Location: bilateral knees (L>R)    Patients goals: she would like to be able to walk >/= 30 minutes prior to onset of knee pain, walk without an assistive device      OBJECTIVE       To be updated at progress note unless specified otherwise      Treatment      Marsha received the treatments listed below:      Bold = performed      therapeutic exercises to develop strength, endurance and flexibility for  37 minutes including:  Recumbent bike 7' level 3  LAQ : 2x15 2#  SAQ 2x10 ea 3#  Supine SLR 12x3"  Sidelying hip abduction 2x10ea 3#  Bridges with GTB : 3x10 5" hold  Bridge with Hip ABD x10  Sit to stand from gold chair with 4# medicine ball press 2x10  Lateral walking with YTB around ankles 2 laps at mirrors  Lateral step downs on aerobic step 2x10  SLS with lateral sliders 2x10  SLS 30" each  Leg Press Machine 3x10 40#  Seated HS Cable Curl 2x15 " "7#  Knee Ext Machine xAMRAP 2.5#     SLS Stance on Foam 2x45" ea  Tandem Stance on Foam with Con Reaction Drill 3x30"ea     Not Performed:  Shuttle, double squats 75#: 3 minutes  Seated hamstring stretch: 2x30"  Gastrocnemius stretch at wall: 2x30"  Step up 6in x15  SAQ with isometric hip adduction: 2x10  TKE, GTB: 20x /each     manual therapy techniques: Joint mobilizations and Soft tissue Mobilization were applied to the: bilateral knees for 10 minutes, including:  STM to bilateral quads with hypervolt massager         Patient Education and Home Exercises      Home Exercises Provided and Patient Education Provided      Education provided:   - HEP     Written Home Exercises Provided: Patient instructed to cont prior HEP. Exercises were reviewed and Marsha was able to demonstrate them prior to the end of the session.  Marsha demonstrated good  understanding of the education provided. See EMR under Patient Instructions for exercises provided during therapy sessions     ASSESSMENT      Marsha presents to treatment with increased pain in bilateral knees and muscle soreness reported in bilateral quads causing increased difficulty with exercises. STM using massager for decreased pain and tightness for improved tolerance to exercises. She continues to be challenged by balance activities with difficulty maintaining single leg stance standing on foam today. Continue to progress as tolerated.      Marsha Is progressing well towards her goals.   Pt prognosis is Good.      Pt will continue to benefit from skilled outpatient physical therapy to address the deficits listed in the problem list box on initial evaluation, provide pt/family education and to maximize pt's level of independence in the home and community environment.      Pt's spiritual, cultural and educational needs considered and pt agreeable to plan of care and goals.     Anticipated Barriers for therapy: scheduling      Goals:  Short Term Goals: 3 weeks   The " patient will be independent with initial home exercise program to facilitate carryover between treatment sessions. Met  2.   The patient will achieve 0 degrees of knee extension AROM in 3 weeks in order to walk without limping of hip compensation.   3.   The patient will demonstrate improved quad strength and motor control as noted by ability to perform SLR without lag in order to progress into advanced there ex.   4.   The patient will be able to ambulate 15 minutes with </= 2/10 knee pain at worse.        Long Term Goals: 10 weeks   1. The patient will demonstrate an increase in quadriceps strength by 1 MMT grade in order to allow the ability to ascend and descend stairs without knee buckling.   2. The patient will demonstrate improved glute strength by 1 MMT grade in order to allow the ability to perform single leg activities without hip compensation or hip drop.  3. The patient will walk for 30 minutes without knee pain in order to perform tasks of shopping for groceries and navigating the supermarket.   4.  Patient will improve FOTO score to </= 39% limited to decrease perceived limitation with mobility.     PLAN      Continue to progress per patient tolerance.     Suzette Villanueva, PTA

## 2022-12-01 ENCOUNTER — CLINICAL SUPPORT (OUTPATIENT)
Dept: REHABILITATION | Facility: HOSPITAL | Age: 77
End: 2022-12-01
Payer: MEDICARE

## 2022-12-01 DIAGNOSIS — M25.661 DECREASED RANGE OF MOTION (ROM) OF BOTH KNEES: ICD-10-CM

## 2022-12-01 DIAGNOSIS — Z74.09 IMPAIRED FUNCTIONAL MOBILITY AND ACTIVITY TOLERANCE: Primary | ICD-10-CM

## 2022-12-01 DIAGNOSIS — M25.662 DECREASED RANGE OF MOTION (ROM) OF BOTH KNEES: ICD-10-CM

## 2022-12-01 DIAGNOSIS — M62.81 QUADRICEPS WEAKNESS: ICD-10-CM

## 2022-12-01 PROCEDURE — 97110 THERAPEUTIC EXERCISES: CPT | Mod: HCNC,PO,CQ

## 2022-12-01 NOTE — PROGRESS NOTES
"[facility]   Physical Therapy Treatment Note     Name: Marsha Lopez [place]  Clinic Number: [id number]    Therapy Diagnosis:   Encounter Diagnoses   Name Primary?    Impaired functional mobility and activity tolerance Yes    Decreased range of motion (ROM) of both knees     Quadriceps weakness          Physician: Marcos Sevilla MD    Visit Date: 12/1/2022    Physician Orders: PT Eval and Treat   Medical Diagnosis from Referral: M17.0 (ICD-10-CM) - Bilateral primary osteoarthritis of knee   Evaluation Date: 8/19/2022  Authorization Period Expiration: 08/19/2023  Plan of Care Expiration: 12/31/2022  Next Progress Note Due: 11/25/2022  Visit # / Visits authorized: 17/ 20    FOTO: 1/3     Precautions: Standard      PTA Visit #: 3/5      Time In: 1:15 pm  Time Out: 1:58 pm  Total Billable Time: 43 minutes     SUBJECTIVE      Pt reports: the knees are still sore     She was compliant with home exercise program.  Response to previous treatment: muscle soreness  Functional change: decreased knee pain     Pain: 5/10  Location: bilateral knees (L>R)    Patients goals: she would like to be able to walk >/= 30 minutes prior to onset of knee pain, walk without an assistive device      OBJECTIVE       To be updated at progress note unless specified otherwise      Treatment      Marsha received the treatments listed below:      Bold = performed      therapeutic exercises to develop strength, endurance and flexibility for  43 minutes including:  Recumbent bike 7' level 3  LAQ : 2x15 2#  SAQ 2x10 ea 3#  Supine SLR 12x3"  Sidelying hip abduction 2x10ea 3#  Bridges with GTB : 3x10 5" hold  Sit to stand from gold chair with 4# medicine ball press 2x10  Lateral walking with YTB around ankles 2 laps at mirrors  Lateral step downs on aerobic step 2x10  SLS with lateral sliders 2x10  SLS 30" each  Leg Press Machine 3x10 40#  Seated HS Cable Curl 2x15 7#  Gastroc stretch on slant board 3x30"  Knee Ext Machine " "xAMRAP 2.5#     SLS Stance on Foam 2x45" ea  Tandem Stance on Foam with Con Reaction Drill 3x30"ea     Not Performed:  Shuttle, double squats 75#: 3 minutes  Seated hamstring stretch: 2x30"  Gastrocnemius stretch at wall: 2x30"  Step up 6in x15  SAQ with isometric hip adduction: 2x10  TKE, GTB: 20x /each     manual therapy techniques: Joint mobilizations and Soft tissue Mobilization were applied to the: bilateral knees for 00 minutes, including:  STM to bilateral quads with hypervolt massager         Patient Education and Home Exercises      Home Exercises Provided and Patient Education Provided      Education provided:   - HEP     Written Home Exercises Provided: Patient instructed to cont prior HEP. Exercises were reviewed and Marsha was able to demonstrate them prior to the end of the session.  Marsha demonstrated good  understanding of the education provided. See EMR under Patient Instructions for exercises provided during therapy sessions     ASSESSMENT      Marsha presents to treatment with continued pain in bilateral knees. She reports calf tightness and stated she has been attempting to stretch it at home. Gastroc stretch on slant board was performed today for tightness. Good tolerance to all other exercises with no increase in pain. She continues with difficulty with single leg balance, but demonstrated improved tandem stance. Continue to progress as tolerated.        Marsha Is progressing well towards her goals.   Pt prognosis is Good.      Pt will continue to benefit from skilled outpatient physical therapy to address the deficits listed in the problem list box on initial evaluation, provide pt/family education and to maximize pt's level of independence in the home and community environment.      Pt's spiritual, cultural and educational needs considered and pt agreeable to plan of care and goals.     Anticipated Barriers for therapy: scheduling      Goals:  Short Term Goals: 3 weeks   The patient will be " independent with initial home exercise program to facilitate carryover between treatment sessions. Met  2.   The patient will achieve 0 degrees of knee extension AROM in 3 weeks in order to walk without limping of hip compensation.   3.   The patient will demonstrate improved quad strength and motor control as noted by ability to perform SLR without lag in order to progress into advanced there ex.   4.   The patient will be able to ambulate 15 minutes with </= 2/10 knee pain at worse.        Long Term Goals: 10 weeks   1. The patient will demonstrate an increase in quadriceps strength by 1 MMT grade in order to allow the ability to ascend and descend stairs without knee buckling.   2. The patient will demonstrate improved glute strength by 1 MMT grade in order to allow the ability to perform single leg activities without hip compensation or hip drop.  3. The patient will walk for 30 minutes without knee pain in order to perform tasks of shopping for groceries and navigating the supermarket.   4.  Patient will improve FOTO score to </= 39% limited to decrease perceived limitation with mobility.     PLAN      Continue to progress per patient tolerance.     Suzette Villanueva, PTA

## 2022-12-06 ENCOUNTER — CLINICAL SUPPORT (OUTPATIENT)
Dept: REHABILITATION | Facility: HOSPITAL | Age: 77
End: 2022-12-06
Attending: INTERNAL MEDICINE
Payer: MEDICARE

## 2022-12-06 DIAGNOSIS — M62.81 QUADRICEPS WEAKNESS: ICD-10-CM

## 2022-12-06 DIAGNOSIS — Z74.09 IMPAIRED FUNCTIONAL MOBILITY AND ACTIVITY TOLERANCE: Primary | ICD-10-CM

## 2022-12-06 DIAGNOSIS — M25.662 DECREASED RANGE OF MOTION (ROM) OF BOTH KNEES: ICD-10-CM

## 2022-12-06 DIAGNOSIS — M25.661 DECREASED RANGE OF MOTION (ROM) OF BOTH KNEES: ICD-10-CM

## 2022-12-06 PROCEDURE — 97140 MANUAL THERAPY 1/> REGIONS: CPT | Mod: HCNC,PO,97

## 2022-12-06 PROCEDURE — 97110 THERAPEUTIC EXERCISES: CPT | Mod: HCNC,PO

## 2022-12-06 NOTE — PROGRESS NOTES
[facility]   Physical Therapy Progress Note     Name: Marsha Lopez Adiel  Clinic Number: [id number]    Therapy Diagnosis:   Encounter Diagnoses   Name Primary?    Impaired functional mobility and activity tolerance Yes    Decreased range of motion (ROM) of both knees     Quadriceps weakness      Physician: Marcos Sevilla MD    Visit Date: 12/6/2022    Physician Orders: PT Eval and Treat   Medical Diagnosis from Referral: M17.0 (ICD-10-CM) - Bilateral primary osteoarthritis of knee   Evaluation Date: 8/19/2022  Authorization Period Expiration: 08/19/2023  Plan of Care Expiration: 12/31/2022  Next Progress Note Due: 11/25/2022  Visit # / Visits authorized: 18/ 20    FOTO: 1/3     Precautions: Standard      PTA Visit #: 0/5      Time In: 1:05 pm  Time Out: 1:55 pm  Total Billable Time: 50 minutes     SUBJECTIVE      Pt reports: overall she has no new complaints at this time and is pleased with her progress thus far. Patient is agreeable to skilled PT today and is agreeable to DC next visit.      She was compliant with home exercise program.  Response to previous treatment: muscle soreness  Functional change: decreased knee pain     Pain: 3/10 (patient reports soreness of unknown onset), Post-MT Treatment 1/10  Location: bilateral knees (L>R)    Patients goals: she would like to be able to walk >/= 30 minutes prior to onset of knee pain, walk without an assistive device      OBJECTIVE       To be updated at progress note unless specified otherwise     Knee ROM: (measured in degrees):     Knee AROM   Right 0-130   Left 2-130         Lower Extremity Strength (graded 0-5 out of 5)     Right LE Left LE   Hip flexion: 5/5 5/5   Knee extension:  5/5 5/5   Ankle dorsiflexion: 5/5 5/5   Posterior fibers of Gluteus Medius 4-/5 4-/5   Hip extension: Not Tested  Not Tested    Knee flexion:  5/5 5/5   Ankle plantarflexion: 5/5 5/5        Treatment      Marsha received the treatments listed below:      "  Bold = performed       therapeutic exercises to develop strength, endurance and flexibility for  40 minutes including:  Recumbent bike 7' level 3  LAQ : 2x15 2#  SAQ x25 ea 2#  Supine SLR 12x3" 2#  Sidelying hip abduction 2x10ea 3#  Bridges with GTB : 3x10 5" hold  Sit to stand from gold chair with 4# medicine ball press 2x10  Lateral walking with YTB around ankles 2 laps at mirrors  Lateral step downs on aerobic step 2x10  SLS with lateral sliders 2x10  SLS 30" each  Leg Press Machine 3x10 40#  Seated HS Cable Curl 2x15 7#  Hip ABD Machine 2x10 10#  Gastroc stretch on slant board 3x30"  Knee Ext Machine xAMRAP 2.5#     SLS Stance on Foam 2x45" ea  Tandem Stance on Foam with Con Reaction Drill 3x30"ea  Tandem Walking 3L    Reassessment x10 min      Not Performed:  Shuttle, double squats 75#: 3 minutes  Seated hamstring stretch: 2x30"  Gastrocnemius stretch at wall: 2x30"  Step up 6in x15  SAQ with isometric hip adduction: 2x10  TKE, GTB: 20x /each     manual therapy techniques: Joint mobilizations and Soft tissue Mobilization were applied to the: bilateral knees for 10 minutes, including:  STM to bilateral quads with hypervolt massager         Patient Education and Home Exercises      Home Exercises Provided and Patient Education Provided      Education provided:   - HEP     Written Home Exercises Provided: Patient instructed to cont prior HEP. Exercises were reviewed and Marsha was able to demonstrate them prior to the end of the session.  Marsha demonstrated good  understanding of the education provided. See EMR under Patient Instructions for exercises provided during therapy sessions     ASSESSMENT      Marsha reports significant improvement in pain and ability to complete all ADL's without feeling of unsteadiness. Patient displays normalized AROM julissa and significant improvements in LE strength. Patient has progressed nicely with skilled PT and is agreeable with supervising PT that she is ready for DC next " visit.         Marsha Is progressing well towards her goals.   Pt prognosis is Good.      Pt will continue to benefit from skilled outpatient physical therapy to address the deficits listed in the problem list box on initial evaluation, provide pt/family education and to maximize pt's level of independence in the home and community environment.      Pt's spiritual, cultural and educational needs considered and pt agreeable to plan of care and goals.     Anticipated Barriers for therapy: scheduling      Goals:  Short Term Goals: 3 weeks   The patient will be independent with initial home exercise program to facilitate carryover between treatment sessions. Met  2.   The patient will achieve 0 degrees of knee extension AROM in 3 weeks in order to walk without limping of hip compensation. MET  3.   The patient will demonstrate improved quad strength and motor control as noted by ability to perform SLR without lag in order to progress into advanced there ex. MET  4.   The patient will be able to ambulate 15 minutes with </= 2/10 knee pain at worse. MET        Long Term Goals: 10 weeks   1. The patient will demonstrate an increase in quadriceps strength by 1 MMT grade in order to allow the ability to ascend and descend stairs without knee buckling. MET  2. The patient will demonstrate improved glute strength by 1 MMT grade in order to allow the ability to perform single leg activities without hip compensation or hip drop.MET  3. The patient will walk for 30 minutes without knee pain in order to perform tasks of shopping for groceries and navigating the supermarket. MET  4.  Patient will improve FOTO score to </= 39% limited to decrease perceived limitation with mobility.- to be assessed next visit     PLAN      DC next visit    Alphonso Washington PT

## 2022-12-08 ENCOUNTER — CLINICAL SUPPORT (OUTPATIENT)
Dept: REHABILITATION | Facility: HOSPITAL | Age: 77
End: 2022-12-08
Payer: COMMERCIAL

## 2022-12-08 DIAGNOSIS — M25.662 DECREASED RANGE OF MOTION (ROM) OF BOTH KNEES: ICD-10-CM

## 2022-12-08 DIAGNOSIS — M62.81 QUADRICEPS WEAKNESS: ICD-10-CM

## 2022-12-08 DIAGNOSIS — M25.661 DECREASED RANGE OF MOTION (ROM) OF BOTH KNEES: ICD-10-CM

## 2022-12-08 DIAGNOSIS — Z74.09 IMPAIRED FUNCTIONAL MOBILITY AND ACTIVITY TOLERANCE: Primary | ICD-10-CM

## 2022-12-08 PROCEDURE — 97110 THERAPEUTIC EXERCISES: CPT | Mod: HCNC,PO

## 2022-12-08 NOTE — PROGRESS NOTES
[facility]   Physical Therapy Treatment Note     Name: Marsha Lopez Moon  Clinic Number: [id number]    Therapy Diagnosis:   Encounter Diagnoses   Name Primary?    Impaired functional mobility and activity tolerance Yes    Decreased range of motion (ROM) of both knees     Quadriceps weakness      Physician: Marcos Sevilla MD    Visit Date: 12/8/2022    [Physician Orders: PT Eval and Treat   Medical Diagnosis from Referral: M17.0 (ICD-10-CM) - Bilateral primary osteoarthritis of knee   Evaluation Date: 8/19/2022  Authorization Period Expiration: 08/19/2023  Plan of Care Expiration: 12/31/2022  Next Progress Note Due: 11/25/2022  Visit # / Visits authorized: 19/ 20    FOTO: 1/3     Precautions: Standard      PTA Visit #: 0/5      Time In: 1:10 pm  Time Out: 2:00 pm  Total Billable Time: 50 minutes     SUBJECTIVE      Pt reports: overall she feels good and feels she is ready for DC at this time.      She was compliant with home exercise program.  Response to previous treatment: muscle soreness  Functional change: decreased knee pain     Pain: 0/10 (patient reports soreness of unknown onset)  Location: bilateral knees (L>R)    Patients goals: she would like to be able to walk >/= 30 minutes prior to onset of knee pain, walk without an assistive device      OBJECTIVE       To be updated at progress note unless specified otherwise      Knee ROM: (measured in degrees):     Knee AROM   Right 0-130   Left 2-130         Lower Extremity Strength (graded 0-5 out of 5)     Right LE Left LE   Hip flexion: 5/5 5/5   Knee extension:  5/5 5/5   Ankle dorsiflexion: 5/5 5/5   Posterior fibers of Gluteus Medius 4-/5 4-/5   Hip extension: Not Tested  Not Tested    Knee flexion:  5/5 5/5   Ankle plantarflexion: 5/5 5/5         Treatment      Marsha received the treatments listed below:       Bold = performed       therapeutic exercises to develop strength, endurance and flexibility for  50 minutes  "including:  Recumbent bike 7' level 3  LAQ : 2x15 2#  SAQ 2x20 ea 2#  Supine SLR 2x12x3" 2#  Sidelying hip abduction 2x10ea 3#  Bridges with GTB : 3x10 5" hold  Sit to stand from gold chair with 4# medicine ball press 2x10  Lateral walking with YTB around ankles 2 laps at mirrors  Lateral step downs on aerobic step 2x10  SLS with lateral sliders 2x10  SLS 30" each  Leg Press Machine 3x10 40#  Seated HS Cable Curl 3x10 7#  Hip ABD Machine 2x10 25#  Gastroc stretch on slant board 3x30"  Knee Ext Machine xAMRAP 2.5#         SLS Stance on Foam 2x45" ea  Tandem Stance on Foam with Con Reaction Drill 3x30"ea  Tandem Walking 3L        Not Performed:  Shuttle, double squats 75#: 3 minutes  Seated hamstring stretch: 2x30"  Gastrocnemius stretch at wall: 2x30"  Step up 6in x15  SAQ with isometric hip adduction: 2x10  TKE, GTB: 20x /each     manual therapy techniques: Joint mobilizations and Soft tissue Mobilization were applied to the: bilateral knees for 10 minutes, including:  STM to bilateral quads with hypervolt massager         Patient Education and Home Exercises      Home Exercises Provided and Patient Education Provided      Education provided:   - HEP     Written Home Exercises Provided: Patient instructed to cont prior HEP. Exercises were reviewed and Marsha was able to demonstrate them prior to the end of the session.  Marsha demonstrated good  understanding of the education provided. See EMR under Patient Instructions for exercises provided during therapy sessions     ASSESSMENT      Marsha reports significant improvement in pain and ability to complete all ADL's without feeling of unsteadiness. Patient displays normalized AROM julissa and significant improvements in LE strength. Patient has progressed nicely with skilled PT and is agreeable with supervising PT that she is ready for DC next visit.         Marsha Is progressing well towards her goals.   Pt prognosis is Good.      Pt will continue to benefit from " skilled outpatient physical therapy to address the deficits listed in the problem list box on initial evaluation, provide pt/family education and to maximize pt's level of independence in the home and community environment.      Pt's spiritual, cultural and educational needs considered and pt agreeable to plan of care and goals.     Anticipated Barriers for therapy: scheduling      Goals:  Short Term Goals: 3 weeks   The patient will be independent with initial home exercise program to facilitate carryover between treatment sessions. Met  2.   The patient will achieve 0 degrees of knee extension AROM in 3 weeks in order to walk without limping of hip compensation. MET  3.   The patient will demonstrate improved quad strength and motor control as noted by ability to perform SLR without lag in order to progress into advanced there ex. MET  4.   The patient will be able to ambulate 15 minutes with </= 2/10 knee pain at worse. MET        Long Term Goals: 10 weeks   1. The patient will demonstrate an increase in quadriceps strength by 1 MMT grade in order to allow the ability to ascend and descend stairs without knee buckling. MET  2. The patient will demonstrate improved glute strength by 1 MMT grade in order to allow the ability to perform single leg activities without hip compensation or hip drop.MET  3. The patient will walk for 30 minutes without knee pain in order to perform tasks of shopping for groceries and navigating the supermarket. MET  4.  Patient will improve FOTO score to </= 39% limited to decrease perceived limitation with mobility.     PLAN      Patient ERIKA Washington, PT

## 2022-12-27 ENCOUNTER — PES CALL (OUTPATIENT)
Dept: ADMINISTRATIVE | Facility: CLINIC | Age: 77
End: 2022-12-27
Payer: MEDICARE

## 2022-12-28 ENCOUNTER — TELEPHONE (OUTPATIENT)
Dept: ENDOCRINOLOGY | Facility: CLINIC | Age: 77
End: 2022-12-28
Payer: MEDICARE

## 2023-01-11 ENCOUNTER — TELEPHONE (OUTPATIENT)
Dept: ADMINISTRATIVE | Facility: CLINIC | Age: 78
End: 2023-01-11
Payer: MEDICARE

## 2023-01-13 ENCOUNTER — OFFICE VISIT (OUTPATIENT)
Dept: FAMILY MEDICINE | Facility: CLINIC | Age: 78
End: 2023-01-13
Payer: MEDICARE

## 2023-01-13 ENCOUNTER — LAB VISIT (OUTPATIENT)
Dept: LAB | Facility: HOSPITAL | Age: 78
End: 2023-01-13
Attending: INTERNAL MEDICINE
Payer: MEDICARE

## 2023-01-13 VITALS
BODY MASS INDEX: 30.67 KG/M2 | OXYGEN SATURATION: 98 % | SYSTOLIC BLOOD PRESSURE: 136 MMHG | HEART RATE: 68 BPM | DIASTOLIC BLOOD PRESSURE: 88 MMHG | TEMPERATURE: 98 F | HEIGHT: 65 IN | WEIGHT: 184.06 LBS

## 2023-01-13 DIAGNOSIS — E66.09 CLASS 1 OBESITY DUE TO EXCESS CALORIES WITH SERIOUS COMORBIDITY AND BODY MASS INDEX (BMI) OF 30.0 TO 30.9 IN ADULT: ICD-10-CM

## 2023-01-13 DIAGNOSIS — Z13.6 ENCOUNTER FOR LIPID SCREENING FOR CARDIOVASCULAR DISEASE: ICD-10-CM

## 2023-01-13 DIAGNOSIS — Z86.39 H/O: OBESITY: ICD-10-CM

## 2023-01-13 DIAGNOSIS — I10 ESSENTIAL (PRIMARY) HYPERTENSION: ICD-10-CM

## 2023-01-13 DIAGNOSIS — E21.0 HYPERPARATHYROIDISM, PRIMARY: ICD-10-CM

## 2023-01-13 DIAGNOSIS — N32.81 OAB (OVERACTIVE BLADDER): ICD-10-CM

## 2023-01-13 DIAGNOSIS — M85.80 OSTEOPENIA, UNSPECIFIED LOCATION: ICD-10-CM

## 2023-01-13 DIAGNOSIS — Z00.00 ENCOUNTER FOR PREVENTIVE HEALTH EXAMINATION: Primary | ICD-10-CM

## 2023-01-13 DIAGNOSIS — Z13.220 ENCOUNTER FOR LIPID SCREENING FOR CARDIOVASCULAR DISEASE: ICD-10-CM

## 2023-01-13 DIAGNOSIS — Z23 FLU VACCINE NEED: ICD-10-CM

## 2023-01-13 DIAGNOSIS — M17.12 OSTEOARTHRITIS OF LEFT KNEE, UNSPECIFIED OSTEOARTHRITIS TYPE: ICD-10-CM

## 2023-01-13 DIAGNOSIS — Z86.73 HISTORY OF TRANSIENT ISCHEMIC ATTACK (TIA): ICD-10-CM

## 2023-01-13 DIAGNOSIS — Z00.00 HEALTHCARE MAINTENANCE: ICD-10-CM

## 2023-01-13 DIAGNOSIS — E78.2 MIXED HYPERLIPIDEMIA: ICD-10-CM

## 2023-01-13 PROBLEM — E66.811 CLASS 1 OBESITY DUE TO EXCESS CALORIES WITH SERIOUS COMORBIDITY IN ADULT: Status: ACTIVE | Noted: 2023-01-13

## 2023-01-13 LAB
ALBUMIN SERPL BCP-MCNC: 3.9 G/DL (ref 3.5–5.2)
ALP SERPL-CCNC: 138 U/L (ref 55–135)
ALT SERPL W/O P-5'-P-CCNC: 8 U/L (ref 10–44)
ANION GAP SERPL CALC-SCNC: 4 MMOL/L (ref 8–16)
AST SERPL-CCNC: 17 U/L (ref 10–40)
BASOPHILS # BLD AUTO: 0.04 K/UL (ref 0–0.2)
BASOPHILS NFR BLD: 0.6 % (ref 0–1.9)
BILIRUB SERPL-MCNC: 0.5 MG/DL (ref 0.1–1)
BUN SERPL-MCNC: 11 MG/DL (ref 8–23)
CALCIUM SERPL-MCNC: 10 MG/DL (ref 8.7–10.5)
CHLORIDE SERPL-SCNC: 105 MMOL/L (ref 95–110)
CHOLEST SERPL-MCNC: 235 MG/DL (ref 120–199)
CHOLEST/HDLC SERPL: 2.9 {RATIO} (ref 2–5)
CO2 SERPL-SCNC: 28 MMOL/L (ref 23–29)
CREAT SERPL-MCNC: 0.8 MG/DL (ref 0.5–1.4)
DIFFERENTIAL METHOD: ABNORMAL
EOSINOPHIL # BLD AUTO: 0.1 K/UL (ref 0–0.5)
EOSINOPHIL NFR BLD: 1.9 % (ref 0–8)
ERYTHROCYTE [DISTWIDTH] IN BLOOD BY AUTOMATED COUNT: 16.9 % (ref 11.5–14.5)
EST. GFR  (NO RACE VARIABLE): >60 ML/MIN/1.73 M^2
GLUCOSE SERPL-MCNC: 85 MG/DL (ref 70–110)
HCT VFR BLD AUTO: 34.2 % (ref 37–48.5)
HDLC SERPL-MCNC: 82 MG/DL (ref 40–75)
HDLC SERPL: 34.9 % (ref 20–50)
HGB BLD-MCNC: 11.1 G/DL (ref 12–16)
IMM GRANULOCYTES # BLD AUTO: 0.01 K/UL (ref 0–0.04)
IMM GRANULOCYTES NFR BLD AUTO: 0.1 % (ref 0–0.5)
LDLC SERPL CALC-MCNC: 136.8 MG/DL (ref 63–159)
LYMPHOCYTES # BLD AUTO: 1.6 K/UL (ref 1–4.8)
LYMPHOCYTES NFR BLD: 24.2 % (ref 18–48)
MCH RBC QN AUTO: 22.4 PG (ref 27–31)
MCHC RBC AUTO-ENTMCNC: 32.5 G/DL (ref 32–36)
MCV RBC AUTO: 69 FL (ref 82–98)
MONOCYTES # BLD AUTO: 0.4 K/UL (ref 0.3–1)
MONOCYTES NFR BLD: 5.7 % (ref 4–15)
NEUTROPHILS # BLD AUTO: 4.6 K/UL (ref 1.8–7.7)
NEUTROPHILS NFR BLD: 67.5 % (ref 38–73)
NONHDLC SERPL-MCNC: 153 MG/DL
NRBC BLD-RTO: 0 /100 WBC
PLATELET # BLD AUTO: 269 K/UL (ref 150–450)
PMV BLD AUTO: 10.7 FL (ref 9.2–12.9)
POTASSIUM SERPL-SCNC: 3.9 MMOL/L (ref 3.5–5.1)
PROT SERPL-MCNC: 7.7 G/DL (ref 6–8.4)
RBC # BLD AUTO: 4.95 M/UL (ref 4–5.4)
SODIUM SERPL-SCNC: 137 MMOL/L (ref 136–145)
T4 FREE SERPL-MCNC: 1.01 NG/DL (ref 0.71–1.51)
TRIGL SERPL-MCNC: 81 MG/DL (ref 30–150)
TSH SERPL DL<=0.005 MIU/L-ACNC: 4.01 UIU/ML (ref 0.4–4)
WBC # BLD AUTO: 6.79 K/UL (ref 3.9–12.7)

## 2023-01-13 PROCEDURE — 83036 HEMOGLOBIN GLYCOSYLATED A1C: CPT | Mod: HCNC | Performed by: INTERNAL MEDICINE

## 2023-01-13 PROCEDURE — 1170F FXNL STATUS ASSESSED: CPT | Mod: HCNC,CPTII,S$GLB, | Performed by: NURSE PRACTITIONER

## 2023-01-13 PROCEDURE — 80061 LIPID PANEL: CPT | Mod: HCNC | Performed by: INTERNAL MEDICINE

## 2023-01-13 PROCEDURE — 99999 PR PBB SHADOW E&M-EST. PATIENT-LVL IV: ICD-10-PCS | Mod: PBBFAC,HCNC,, | Performed by: NURSE PRACTITIONER

## 2023-01-13 PROCEDURE — 3079F PR MOST RECENT DIASTOLIC BLOOD PRESSURE 80-89 MM HG: ICD-10-PCS | Mod: HCNC,CPTII,S$GLB, | Performed by: NURSE PRACTITIONER

## 2023-01-13 PROCEDURE — G0439 PPPS, SUBSEQ VISIT: HCPCS | Mod: HCNC,S$GLB,, | Performed by: NURSE PRACTITIONER

## 2023-01-13 PROCEDURE — G0008 FLU VACCINE - QUADRIVALENT - ADJUVANTED: ICD-10-PCS | Mod: HCNC,S$GLB,, | Performed by: NURSE PRACTITIONER

## 2023-01-13 PROCEDURE — 82306 VITAMIN D 25 HYDROXY: CPT | Mod: HCNC | Performed by: INTERNAL MEDICINE

## 2023-01-13 PROCEDURE — 84439 ASSAY OF FREE THYROXINE: CPT | Mod: HCNC | Performed by: INTERNAL MEDICINE

## 2023-01-13 PROCEDURE — 80053 COMPREHEN METABOLIC PANEL: CPT | Mod: HCNC | Performed by: INTERNAL MEDICINE

## 2023-01-13 PROCEDURE — 1170F PR FUNCTIONAL STATUS ASSESSED: ICD-10-PCS | Mod: HCNC,CPTII,S$GLB, | Performed by: NURSE PRACTITIONER

## 2023-01-13 PROCEDURE — 3288F PR FALLS RISK ASSESSMENT DOCUMENTED: ICD-10-PCS | Mod: HCNC,CPTII,S$GLB, | Performed by: NURSE PRACTITIONER

## 2023-01-13 PROCEDURE — 1101F PT FALLS ASSESS-DOCD LE1/YR: CPT | Mod: HCNC,CPTII,S$GLB, | Performed by: NURSE PRACTITIONER

## 2023-01-13 PROCEDURE — 84443 ASSAY THYROID STIM HORMONE: CPT | Mod: GA,HCNC | Performed by: INTERNAL MEDICINE

## 2023-01-13 PROCEDURE — 90694 FLU VACCINE - QUADRIVALENT - ADJUVANTED: ICD-10-PCS | Mod: HCNC,S$GLB,, | Performed by: NURSE PRACTITIONER

## 2023-01-13 PROCEDURE — 1126F PR PAIN SEVERITY QUANTIFIED, NO PAIN PRESENT: ICD-10-PCS | Mod: HCNC,CPTII,S$GLB, | Performed by: NURSE PRACTITIONER

## 2023-01-13 PROCEDURE — 3288F FALL RISK ASSESSMENT DOCD: CPT | Mod: HCNC,CPTII,S$GLB, | Performed by: NURSE PRACTITIONER

## 2023-01-13 PROCEDURE — 1160F PR REVIEW ALL MEDS BY PRESCRIBER/CLIN PHARMACIST DOCUMENTED: ICD-10-PCS | Mod: HCNC,CPTII,S$GLB, | Performed by: NURSE PRACTITIONER

## 2023-01-13 PROCEDURE — 1101F PR PT FALLS ASSESS DOC 0-1 FALLS W/OUT INJ PAST YR: ICD-10-PCS | Mod: HCNC,CPTII,S$GLB, | Performed by: NURSE PRACTITIONER

## 2023-01-13 PROCEDURE — 3075F SYST BP GE 130 - 139MM HG: CPT | Mod: HCNC,CPTII,S$GLB, | Performed by: NURSE PRACTITIONER

## 2023-01-13 PROCEDURE — 3079F DIAST BP 80-89 MM HG: CPT | Mod: HCNC,CPTII,S$GLB, | Performed by: NURSE PRACTITIONER

## 2023-01-13 PROCEDURE — 1160F RVW MEDS BY RX/DR IN RCRD: CPT | Mod: HCNC,CPTII,S$GLB, | Performed by: NURSE PRACTITIONER

## 2023-01-13 PROCEDURE — 1159F MED LIST DOCD IN RCRD: CPT | Mod: HCNC,CPTII,S$GLB, | Performed by: NURSE PRACTITIONER

## 2023-01-13 PROCEDURE — 99999 PR PBB SHADOW E&M-EST. PATIENT-LVL IV: CPT | Mod: PBBFAC,HCNC,, | Performed by: NURSE PRACTITIONER

## 2023-01-13 PROCEDURE — G0008 ADMIN INFLUENZA VIRUS VAC: HCPCS | Mod: HCNC,S$GLB,, | Performed by: NURSE PRACTITIONER

## 2023-01-13 PROCEDURE — G0439 PR MEDICARE ANNUAL WELLNESS SUBSEQUENT VISIT: ICD-10-PCS | Mod: HCNC,S$GLB,, | Performed by: NURSE PRACTITIONER

## 2023-01-13 PROCEDURE — 85025 COMPLETE CBC W/AUTO DIFF WBC: CPT | Mod: GA,HCNC | Performed by: INTERNAL MEDICINE

## 2023-01-13 PROCEDURE — 1126F AMNT PAIN NOTED NONE PRSNT: CPT | Mod: HCNC,CPTII,S$GLB, | Performed by: NURSE PRACTITIONER

## 2023-01-13 PROCEDURE — 1159F PR MEDICATION LIST DOCUMENTED IN MEDICAL RECORD: ICD-10-PCS | Mod: HCNC,CPTII,S$GLB, | Performed by: NURSE PRACTITIONER

## 2023-01-13 PROCEDURE — 36415 COLL VENOUS BLD VENIPUNCTURE: CPT | Mod: HCNC,PN | Performed by: INTERNAL MEDICINE

## 2023-01-13 PROCEDURE — 3075F PR MOST RECENT SYSTOLIC BLOOD PRESS GE 130-139MM HG: ICD-10-PCS | Mod: HCNC,CPTII,S$GLB, | Performed by: NURSE PRACTITIONER

## 2023-01-13 PROCEDURE — 90694 VACC AIIV4 NO PRSRV 0.5ML IM: CPT | Mod: HCNC,S$GLB,, | Performed by: NURSE PRACTITIONER

## 2023-01-13 NOTE — PATIENT INSTRUCTIONS
Counseling and Referral of Other Preventative  (Italic type indicates deductible and co-insurance are waived)    Patient Name: Marsha Chun  Today's Date: 1/13/2023    Health Maintenance       Date Due Completion Date    Shingles Vaccine (1 of 2) Never done ---    Influenza Vaccine (1) 09/01/2022 12/15/2021    COVID-19 Vaccine (5 - Booster for Pfizer series) 09/15/2022 7/21/2022    DEXA Scan 02/22/2024 2/22/2021    Override on 8/2/2011: Done    Lipid Panel 01/13/2028 1/13/2023    TETANUS VACCINE 03/23/2028 3/23/2018        Orders Placed This Encounter   Procedures    Influenza (FLUAD) - Quadrivalent (Adjuvanted) *Preferred* (65+) (PF)     The following information is provided to all patients.  This information is to help you find resources for any of the problems found today that may be affecting your health:                Living healthy guide: www.[facility].[place].[facility]      Understanding Diabetes: www.diabetes.org      Eating healthy: www.cdc.gov/healthyweight      CDC home safety checklist: www.cdc.gov/steadi/patient.html      Agency on Aging: www.goea.[place].[facility]      Alcoholics anonymous (AA): www.aa.org      Physical Activity: www.[place].nih.gov/bf8pygc      Tobacco use: www.quitwithusla.org

## 2023-01-13 NOTE — PROGRESS NOTES
Pt tolerated injection well, pt instructed to wait inside this facility for 15 min prior to departing to monitor for allergic/adverse reaction. Pt verbalized understanding.

## 2023-01-13 NOTE — PROGRESS NOTES
"  Marsha Chun presented for a  Medicare AWV and comprehensive Health Risk Assessment today. The following components were reviewed and updated:    Medical history  Family History  Social history  Allergies and Current Medications  Health Risk Assessment  Health Maintenance  Care Team       ** See Completed Assessments for Annual Wellness Visit within the encounter summary.**       The following assessments were completed:  Living Situation  CAGE  Depression Screening  Timed Get Up and Go  Whisper Test  Cognitive Function Screening  Nutrition Screening  ADL Screening  PAQ Screening            Vitals:    01/13/23 1304   BP: 136/88   Pulse: 68   Temp: 97.6 °F (36.4 °C)   TempSrc: Oral   SpO2: 98%   Weight: 83.5 kg (184 lb 1.4 oz)   Height: 5' 5" (1.651 m)     Body mass index is 30.63 kg/m².  Physical Exam  Vitals and nursing note reviewed.   Constitutional:       Appearance: Normal appearance.   Cardiovascular:      Rate and Rhythm: Normal rate.      Pulses: Normal pulses.      Heart sounds: Normal heart sounds.   Pulmonary:      Effort: Pulmonary effort is normal.      Breath sounds: Normal breath sounds.   Musculoskeletal:         General: Normal range of motion.   Neurological:      Mental Status: She is alert and oriented to person, place, and time.   Psychiatric:         Mood and Affect: Mood normal.         Behavior: Behavior normal.           Diagnoses and health risks identified today and associated recommendations/orders:    1. Encounter for preventive health examination  Pt was seen today for an Annual Wellness visit. Healthcare maintenance and screening recommendations were discussed and updated as indicated. Return in one year for AWV.    Review current opioid prescriptions: n/a  Screen for potential Substance Use Disorders: n/a    2. Hyperparathyroidism, primary  The current medical regimen is effective;  continue present plan and medications.    3. Class 1 obesity due to excess calories with serious " comorbidity and body mass index (BMI) of 30.0 to 30.9 in adult  The patient is asked to make an attempt to improve diet and exercise patterns to aid in medical management of this problem.    4. Flu vaccine need  Seasonal flu discussed. Understanding verbalized.  - Influenza (FLUAD) - Quadrivalent (Adjuvanted) *Preferred* (65+) (PF)    5. Essential (primary) hypertension  The current medical regimen is effective;  continue present plan and medications.    6. Mixed hyperlipidemia  The current medical regimen is effective;  continue present plan and medications.    7. OAB (overactive bladder)  The current medical regimen is effective;  continue present plan and medications.    8. Osteopenia, unspecified location  The current medical regimen is effective;  continue present plan and medications.    9. Osteoarthritis of left knee, unspecified osteoarthritis type  The current medical regimen is effective;  continue present plan and medications.    10. History of transient ischemic attack (TIA)  The current medical regimen is effective;  continue present plan and medications.          Provided Marsha with a 5-10 year written screening schedule and personal prevention plan. Recommendations were developed using the USPSTF age appropriate recommendations. Education, counseling, and referrals were provided as needed. After Visit Summary printed and given to patient which includes a list of additional screenings\tests needed.    Follow up in about 10 days (around 1/23/2023) with provider.  WAQAS Villagran  I offered to discuss advanced care planning, including how to pick a person who would make decisions for you if you were unable to make them for yourself, called a health care power of , and what kind of decisions you might make such as use of life sustaining treatments such as ventilators and tube feeding when faced with a life limiting illness recorded on a living will that they will need to know. (How you want  to be cared for as you near the end of your natural life)     X Patient is interested in learning more about how to make advanced directives.  I provided them paperwork and offered to discuss this with them.

## 2023-01-14 LAB
25(OH)D3+25(OH)D2 SERPL-MCNC: 41 NG/ML (ref 30–96)
ESTIMATED AVG GLUCOSE: 100 MG/DL (ref 68–131)
HBA1C MFR BLD: 5.1 % (ref 4–5.6)

## 2023-01-23 ENCOUNTER — LAB VISIT (OUTPATIENT)
Dept: LAB | Facility: HOSPITAL | Age: 78
End: 2023-01-23
Attending: INTERNAL MEDICINE
Payer: MEDICARE

## 2023-01-23 ENCOUNTER — OFFICE VISIT (OUTPATIENT)
Dept: FAMILY MEDICINE | Facility: CLINIC | Age: 78
End: 2023-01-23
Payer: MEDICARE

## 2023-01-23 VITALS
WEIGHT: 185.19 LBS | TEMPERATURE: 98 F | OXYGEN SATURATION: 98 % | DIASTOLIC BLOOD PRESSURE: 88 MMHG | SYSTOLIC BLOOD PRESSURE: 168 MMHG | HEIGHT: 65 IN | HEART RATE: 72 BPM | BODY MASS INDEX: 30.85 KG/M2

## 2023-01-23 DIAGNOSIS — I10 ESSENTIAL (PRIMARY) HYPERTENSION: ICD-10-CM

## 2023-01-23 DIAGNOSIS — Z00.00 HEALTHCARE MAINTENANCE: ICD-10-CM

## 2023-01-23 DIAGNOSIS — M85.80 OSTEOPENIA, UNSPECIFIED LOCATION: ICD-10-CM

## 2023-01-23 DIAGNOSIS — I70.0 AORTIC ATHEROSCLEROSIS: ICD-10-CM

## 2023-01-23 DIAGNOSIS — E53.8 B12 DEFICIENCY: ICD-10-CM

## 2023-01-23 DIAGNOSIS — E78.2 MIXED HYPERLIPIDEMIA: ICD-10-CM

## 2023-01-23 DIAGNOSIS — M17.0 BILATERAL PRIMARY OSTEOARTHRITIS OF KNEE: ICD-10-CM

## 2023-01-23 DIAGNOSIS — D53.9 ANEMIA ASSOCIATED WITH NUTRITIONAL DEFICIENCY: ICD-10-CM

## 2023-01-23 DIAGNOSIS — Z00.00 HEALTHCARE MAINTENANCE: Primary | ICD-10-CM

## 2023-01-23 DIAGNOSIS — E21.0 HYPERPARATHYROIDISM, PRIMARY: ICD-10-CM

## 2023-01-23 DIAGNOSIS — D64.9 ANEMIA, UNSPECIFIED TYPE: ICD-10-CM

## 2023-01-23 DIAGNOSIS — Z12.39 ENCOUNTER FOR SCREENING FOR MALIGNANT NEOPLASM OF BREAST, UNSPECIFIED SCREENING MODALITY: ICD-10-CM

## 2023-01-23 DIAGNOSIS — Z12.31 ENCOUNTER FOR SCREENING MAMMOGRAM FOR MALIGNANT NEOPLASM OF BREAST: ICD-10-CM

## 2023-01-23 LAB
BASOPHILS # BLD AUTO: 0.05 K/UL (ref 0–0.2)
BASOPHILS NFR BLD: 0.8 % (ref 0–1.9)
DIFFERENTIAL METHOD: ABNORMAL
EOSINOPHIL # BLD AUTO: 0.1 K/UL (ref 0–0.5)
EOSINOPHIL NFR BLD: 1.1 % (ref 0–8)
ERYTHROCYTE [DISTWIDTH] IN BLOOD BY AUTOMATED COUNT: 17.2 % (ref 11.5–14.5)
FERRITIN SERPL-MCNC: 10 NG/ML (ref 20–300)
HCT VFR BLD AUTO: 33.5 % (ref 37–48.5)
HGB BLD-MCNC: 11.2 G/DL (ref 12–16)
IMM GRANULOCYTES # BLD AUTO: 0.02 K/UL (ref 0–0.04)
IMM GRANULOCYTES NFR BLD AUTO: 0.3 % (ref 0–0.5)
IRON SERPL-MCNC: 48 UG/DL (ref 30–160)
LYMPHOCYTES # BLD AUTO: 1.9 K/UL (ref 1–4.8)
LYMPHOCYTES NFR BLD: 30.8 % (ref 18–48)
MCH RBC QN AUTO: 23 PG (ref 27–31)
MCHC RBC AUTO-ENTMCNC: 33.4 G/DL (ref 32–36)
MCV RBC AUTO: 69 FL (ref 82–98)
MONOCYTES # BLD AUTO: 0.3 K/UL (ref 0.3–1)
MONOCYTES NFR BLD: 5.3 % (ref 4–15)
NEUTROPHILS # BLD AUTO: 3.8 K/UL (ref 1.8–7.7)
NEUTROPHILS NFR BLD: 61.7 % (ref 38–73)
NRBC BLD-RTO: 0 /100 WBC
PLATELET # BLD AUTO: 281 K/UL (ref 150–450)
PMV BLD AUTO: 10.2 FL (ref 9.2–12.9)
PTH-INTACT SERPL-MCNC: 123.1 PG/ML (ref 9–77)
RBC # BLD AUTO: 4.86 M/UL (ref 4–5.4)
SATURATED IRON: 12 % (ref 20–50)
TOTAL IRON BINDING CAPACITY: 410 UG/DL (ref 250–450)
TRANSFERRIN SERPL-MCNC: 277 MG/DL (ref 200–375)
WBC # BLD AUTO: 6.2 K/UL (ref 3.9–12.7)

## 2023-01-23 PROCEDURE — 1126F PR PAIN SEVERITY QUANTIFIED, NO PAIN PRESENT: ICD-10-PCS | Mod: HCNC,CPTII,S$GLB, | Performed by: INTERNAL MEDICINE

## 2023-01-23 PROCEDURE — 1126F AMNT PAIN NOTED NONE PRSNT: CPT | Mod: HCNC,CPTII,S$GLB, | Performed by: INTERNAL MEDICINE

## 2023-01-23 PROCEDURE — 1160F RVW MEDS BY RX/DR IN RCRD: CPT | Mod: HCNC,CPTII,S$GLB, | Performed by: INTERNAL MEDICINE

## 2023-01-23 PROCEDURE — 1101F PT FALLS ASSESS-DOCD LE1/YR: CPT | Mod: HCNC,CPTII,S$GLB, | Performed by: INTERNAL MEDICINE

## 2023-01-23 PROCEDURE — 3079F PR MOST RECENT DIASTOLIC BLOOD PRESSURE 80-89 MM HG: ICD-10-PCS | Mod: HCNC,CPTII,S$GLB, | Performed by: INTERNAL MEDICINE

## 2023-01-23 PROCEDURE — 1160F PR REVIEW ALL MEDS BY PRESCRIBER/CLIN PHARMACIST DOCUMENTED: ICD-10-PCS | Mod: HCNC,CPTII,S$GLB, | Performed by: INTERNAL MEDICINE

## 2023-01-23 PROCEDURE — 1159F PR MEDICATION LIST DOCUMENTED IN MEDICAL RECORD: ICD-10-PCS | Mod: HCNC,CPTII,S$GLB, | Performed by: INTERNAL MEDICINE

## 2023-01-23 PROCEDURE — 3077F PR MOST RECENT SYSTOLIC BLOOD PRESSURE >= 140 MM HG: ICD-10-PCS | Mod: HCNC,CPTII,S$GLB, | Performed by: INTERNAL MEDICINE

## 2023-01-23 PROCEDURE — 84075 ASSAY ALKALINE PHOSPHATASE: CPT | Mod: HCNC | Performed by: INTERNAL MEDICINE

## 2023-01-23 PROCEDURE — 84466 ASSAY OF TRANSFERRIN: CPT | Mod: HCNC | Performed by: INTERNAL MEDICINE

## 2023-01-23 PROCEDURE — 3079F DIAST BP 80-89 MM HG: CPT | Mod: HCNC,CPTII,S$GLB, | Performed by: INTERNAL MEDICINE

## 2023-01-23 PROCEDURE — 1159F MED LIST DOCD IN RCRD: CPT | Mod: HCNC,CPTII,S$GLB, | Performed by: INTERNAL MEDICINE

## 2023-01-23 PROCEDURE — 82746 ASSAY OF FOLIC ACID SERUM: CPT | Mod: HCNC | Performed by: INTERNAL MEDICINE

## 2023-01-23 PROCEDURE — 1101F PR PT FALLS ASSESS DOC 0-1 FALLS W/OUT INJ PAST YR: ICD-10-PCS | Mod: HCNC,CPTII,S$GLB, | Performed by: INTERNAL MEDICINE

## 2023-01-23 PROCEDURE — 99214 PR OFFICE/OUTPT VISIT, EST, LEVL IV, 30-39 MIN: ICD-10-PCS | Mod: HCNC,S$GLB,, | Performed by: INTERNAL MEDICINE

## 2023-01-23 PROCEDURE — 99999 PR PBB SHADOW E&M-EST. PATIENT-LVL V: ICD-10-PCS | Mod: PBBFAC,HCNC,, | Performed by: INTERNAL MEDICINE

## 2023-01-23 PROCEDURE — 99214 OFFICE O/P EST MOD 30 MIN: CPT | Mod: HCNC,S$GLB,, | Performed by: INTERNAL MEDICINE

## 2023-01-23 PROCEDURE — 83970 ASSAY OF PARATHORMONE: CPT | Mod: HCNC | Performed by: INTERNAL MEDICINE

## 2023-01-23 PROCEDURE — 82607 VITAMIN B-12: CPT | Mod: HCNC | Performed by: INTERNAL MEDICINE

## 2023-01-23 PROCEDURE — 85025 COMPLETE CBC W/AUTO DIFF WBC: CPT | Mod: HCNC | Performed by: INTERNAL MEDICINE

## 2023-01-23 PROCEDURE — 3077F SYST BP >= 140 MM HG: CPT | Mod: HCNC,CPTII,S$GLB, | Performed by: INTERNAL MEDICINE

## 2023-01-23 PROCEDURE — 3288F FALL RISK ASSESSMENT DOCD: CPT | Mod: HCNC,CPTII,S$GLB, | Performed by: INTERNAL MEDICINE

## 2023-01-23 PROCEDURE — 82728 ASSAY OF FERRITIN: CPT | Mod: HCNC | Performed by: INTERNAL MEDICINE

## 2023-01-23 PROCEDURE — 99999 PR PBB SHADOW E&M-EST. PATIENT-LVL V: CPT | Mod: PBBFAC,HCNC,, | Performed by: INTERNAL MEDICINE

## 2023-01-23 PROCEDURE — 3288F PR FALLS RISK ASSESSMENT DOCUMENTED: ICD-10-PCS | Mod: HCNC,CPTII,S$GLB, | Performed by: INTERNAL MEDICINE

## 2023-01-23 NOTE — PROGRESS NOTES
HISTORY OF PRESENT ILLNESS:  Marsha Chun is a 78 y.o. female who presents to the clinic today for Follow-up    Last seen by me 7/2022.    Hyperparathyroidism, Osteopenia  Active monitoring and follows with endocrinology - to be seen in March.    HTN  Managed with amlodipine.  Home BP was 120/72.     Bilateral knee osteoarthritis  Seen by Ortho Dr. Robin/June 2022.  Managed medically.  Attended PT fall 2022.  Using cane to help stabilize.  No recent falls.    Hyperlipidemia  On statin.  The 10-year ASCVD risk score (Sonia OBRIEN, et al., 2019) is: 30.3%    Values used to calculate the score:      Age: 78 years      Sex: Female      Is Non- : Yes      Diabetic: No      Tobacco smoker: No      Systolic Blood Pressure: 168 mmHg      Is BP treated: Yes      HDL Cholesterol: 82 mg/dL      Total Cholesterol: 235 mg/dL    Anemia noted on recent labs.  Notes occasional urge incontinence but does not wish to take medication.  Had been prescribed oxybutynin in the past but did not wish to take.    PAST MEDICAL HISTORY:  Past Medical History:   Diagnosis Date    History of TB skin testing     Hyperparathyroidism, unspecified     Hypertension     Osteopenia     Overweight(278.02)        PAST SURGICAL HISTORY:  Past Surgical History:   Procedure Laterality Date    COLONOSCOPY N/A 10/29/2019    Procedure: COLONOSCOPY;  Surgeon: Amy Alex MD;  Location: [facility];  Service: Endoscopy;  Laterality: N/A;  confirmed appt-sp    lasik and a rotator cuff surgery      SHOULDER SURGERY         SOCIAL HISTORY:  Social History     Socioeconomic History    Marital status:    Tobacco Use    Smoking status: Never     Passive exposure: Never    Smokeless tobacco: Never   Substance and Sexual Activity    Alcohol use: No     Alcohol/week: 0.0 standard drinks    Drug use: No    Sexual activity: Yes     Partners: Male       FAMILY HISTORY:  Family History   Problem Relation Age of Onset    Stroke  Sister     Diabetes Neg Hx     Thyroid cancer Neg Hx     Osteoporosis Neg Hx        ALLERGIES AND MEDICATIONS: updated and reviewed.  Review of patient's allergies indicates:  No Known Allergies  Medication List with Changes/Refills   Current Medications    ACETAMINOPHEN (TYLENOL) 500 MG TABLET    Take 2 tablets (1,000 mg total) by mouth 2 (two) times daily as needed for Pain.    AMLODIPINE (NORVASC) 5 MG TABLET    Take 1 tablet (5 mg total) by mouth once daily.    CHLORHEXIDINE (PERIDEX) 0.12 % SOLUTION        CHOLECALCIFEROL, VITAMIN D3, (VITAMIN D3) 125 MCG (5,000 UNIT) TAB    Take 1 tablet (5,000 Units total) by mouth once daily.    FISH OIL-OMEGA-3 FATTY ACIDS 300-1,000 MG CAPSULE    Take 2 g by mouth once daily.    IBUPROFEN (ADVIL,MOTRIN) 600 MG TABLET    Take 1 tablet (600 mg total) by mouth every 6 (six) hours as needed for Pain.    UNABLE TO FIND    medication name: Omega XL          CARE TEAM:  Patient Care Team:  Marcos Sevilla MD as PCP - General (Internal Medicine)  Nisa Dunaway MA as Care Coordinator         REVIEW OF SYSTEMS:  Review of Systems   Constitutional:  Negative for chills and fever.   HENT:  Negative for congestion and postnasal drip.    Eyes:  Negative for photophobia and visual disturbance.   Respiratory:  Negative for cough and shortness of breath.    Cardiovascular:  Negative for chest pain and palpitations.   Gastrointestinal:  Negative for abdominal pain, nausea and vomiting.   Genitourinary:  Negative for dysuria and frequency.   Musculoskeletal:  Positive for arthralgias.   Neurological:  Negative for light-headedness and headaches.   Psychiatric/Behavioral:  Negative for dysphoric mood. The patient is not nervous/anxious.        PHYSICAL EXAM:   Vitals:    01/23/23 1012   BP: (!) 168/88   Pulse:    Temp:              Body mass index is 30.82 kg/m².     General appearance - alert, well appearing, and in no distress and oriented to person, place, and time  Mental status -  normal mood, behavior, speech, dress, motor activity, and thought processes  Eyes - sclera anicteric, left eye normal, right eye normal  Chest - no tachypnea, retractions or cyanosis  Neurological - alert, oriented, normal speech, no focal findings or movement disorder noted  Extremities - no pedal edema noted, intact peripheral pulses      ASSESSMENT AND PLAN:  Healthcare maintenance  -     CBC Auto Differential; Future; Expected date: 01/23/2023  -     Iron and TIBC; Future; Expected date: 01/23/2023  -     Ferritin; Future; Expected date: 01/23/2023  -     Vitamin B12; Future; Expected date: 01/23/2023  -     Folate; Future; Expected date: 01/23/2023  -     PTH, intact; Future; Expected date: 01/23/2023    Essential (primary) hypertension        - Elevated BP today in setting of compliance on meds.  Notes high salt intake on the weekend. Return for nurse visit.  DASH and low salt intake reinforced.    Aortic atherosclerosis  Mixed hyperlipidemia        - On statin.  Stable.    Hyperparathyroidism, primary  Osteopenia, unspecified location  -     PTH, intact; Future; Expected date: 01/23/2023  -     ALKALINE PHOSPHATASE, BONE SPECIFIC; Future; Expected date: 01/23/2023  -     follow up with endo    Bilateral primary osteoarthritis of knee        - Stable on current management.    Encounter for screening for malignant neoplasm of breast, unspecified screening modality  -     Mammo Digital Screening Bilat w/ Anjel; Future; Expected date: 01/23/2023    Encounter for screening mammogram for malignant neoplasm of breast  -     Mammo Digital Screening Bilat w/ Anjel; Future; Expected date: 01/23/2023    Anemia, unspecified type  -     CBC Auto Differential; Future; Expected date: 01/23/2023  -     Iron and TIBC; Future; Expected date: 01/23/2023  -     Ferritin; Future; Expected date: 01/23/2023  -     Vitamin B12; Future; Expected date: 01/23/2023  -     Folate; Future; Expected date: 01/23/2023    B12 deficiency  -      Vitamin B12; Future; Expected date: 01/23/2023    Anemia associated with nutritional deficiency  -     Folate; Future; Expected date: 01/23/2023              Follow up 6 months or sooner as needed.

## 2023-01-24 LAB
FOLATE SERPL-MCNC: 7.9 NG/ML (ref 4–24)
VIT B12 SERPL-MCNC: 818 PG/ML (ref 210–950)

## 2023-01-27 LAB — ALP BONE SERPL-MCNC: 18.9 UG/L (ref 5.6–29)

## 2023-02-02 ENCOUNTER — IMMUNIZATION (OUTPATIENT)
Dept: OBSTETRICS AND GYNECOLOGY | Facility: CLINIC | Age: 78
End: 2023-02-02
Payer: MEDICARE

## 2023-02-02 DIAGNOSIS — Z23 NEED FOR VACCINATION: Primary | ICD-10-CM

## 2023-02-02 PROCEDURE — 91312 COVID-19, MRNA, LNP-S, BIVALENT BOOSTER, PF, 30 MCG/0.3 ML DOSE: CPT | Mod: HCNC,S$GLB,, | Performed by: FAMILY MEDICINE

## 2023-02-02 PROCEDURE — 0124A COVID-19, MRNA, LNP-S, BIVALENT BOOSTER, PF, 30 MCG/0.3 ML DOSE: CPT | Mod: HCNC,PBBFAC | Performed by: FAMILY MEDICINE

## 2023-02-02 PROCEDURE — 91312 COVID-19, MRNA, LNP-S, BIVALENT BOOSTER, PF, 30 MCG/0.3 ML DOSE: ICD-10-PCS | Mod: HCNC,S$GLB,, | Performed by: FAMILY MEDICINE

## 2023-02-06 ENCOUNTER — CLINICAL SUPPORT (OUTPATIENT)
Dept: FAMILY MEDICINE | Facility: CLINIC | Age: 78
End: 2023-02-06
Payer: MEDICARE

## 2023-02-06 ENCOUNTER — HOSPITAL ENCOUNTER (OUTPATIENT)
Dept: RADIOLOGY | Facility: HOSPITAL | Age: 78
Discharge: HOME | End: 2023-02-06
Attending: INTERNAL MEDICINE
Payer: MEDICARE

## 2023-02-06 VITALS — DIASTOLIC BLOOD PRESSURE: 88 MMHG | HEART RATE: 86 BPM | SYSTOLIC BLOOD PRESSURE: 144 MMHG

## 2023-02-06 DIAGNOSIS — Z12.39 ENCOUNTER FOR SCREENING FOR MALIGNANT NEOPLASM OF BREAST, UNSPECIFIED SCREENING MODALITY: ICD-10-CM

## 2023-02-06 DIAGNOSIS — I10 ESSENTIAL (PRIMARY) HYPERTENSION: Primary | ICD-10-CM

## 2023-02-06 DIAGNOSIS — Z12.31 ENCOUNTER FOR SCREENING MAMMOGRAM FOR MALIGNANT NEOPLASM OF BREAST: ICD-10-CM

## 2023-02-06 PROCEDURE — 99499 UNLISTED E&M SERVICE: CPT | Mod: HCNC,S$GLB,, | Performed by: INTERNAL MEDICINE

## 2023-02-06 PROCEDURE — 77067 SCR MAMMO BI INCL CAD: CPT | Mod: TC,HCNC,PO

## 2023-02-06 PROCEDURE — 99999 PR PBB SHADOW E&M-EST. PATIENT-LVL II: ICD-10-PCS | Mod: PBBFAC,HCNC,,

## 2023-02-06 PROCEDURE — 77067 SCR MAMMO BI INCL CAD: CPT | Mod: 26,HCNC,, | Performed by: RADIOLOGY

## 2023-02-06 PROCEDURE — 99999 PR PBB SHADOW E&M-EST. PATIENT-LVL II: CPT | Mod: PBBFAC,HCNC,,

## 2023-02-06 PROCEDURE — 77067 MAMMO DIGITAL SCREENING BILAT WITH TOMO: ICD-10-PCS | Mod: 26,HCNC,, | Performed by: RADIOLOGY

## 2023-02-06 PROCEDURE — 99499 NO LOS: ICD-10-PCS | Mod: HCNC,S$GLB,, | Performed by: INTERNAL MEDICINE

## 2023-02-06 PROCEDURE — 77063 MAMMO DIGITAL SCREENING BILAT WITH TOMO: ICD-10-PCS | Mod: 26,HCNC,, | Performed by: RADIOLOGY

## 2023-02-06 PROCEDURE — 77063 BREAST TOMOSYNTHESIS BI: CPT | Mod: 26,HCNC,, | Performed by: RADIOLOGY

## 2023-02-06 RX ORDER — BENAZEPRIL HYDROCHLORIDE 10 MG/1
10 TABLET ORAL DAILY
Qty: 90 TABLET | Refills: 1 | Status: SHIPPED | OUTPATIENT
Start: 2023-02-06 | End: 2023-02-08 | Stop reason: SDUPTHER

## 2023-02-06 NOTE — PROGRESS NOTES
Marsha Chun 78 y.o. female is here today for Blood Pressure check.   History of HTN yes.    Review of patient's allergies indicates:  No Known Allergies  Creatinine   Date Value Ref Range Status   01/13/2023 0.8 0.5 - 1.4 mg/dL Final     Sodium   Date Value Ref Range Status   01/13/2023 137 136 - 145 mmol/L Final     Potassium   Date Value Ref Range Status   01/13/2023 3.9 3.5 - 5.1 mmol/L Final   ]  Patient denies taking blood pressure medications on a regular basis at the same time of the day.     Current Outpatient Medications:     acetaminophen (TYLENOL) 500 MG tablet, Take 2 tablets (1,000 mg total) by mouth 2 (two) times daily as needed for Pain. (Patient not taking: Reported on 1/23/2023), Disp: 50 tablet, Rfl: 2    amLODIPine (NORVASC) 5 MG tablet, Take 1 tablet (5 mg total) by mouth once daily., Disp: 90 tablet, Rfl: 3    chlorhexidine (PERIDEX) 0.12 % solution, , Disp: , Rfl:     cholecalciferol, vitamin D3, (VITAMIN D3) 125 mcg (5,000 unit) Tab, Take 1 tablet (5,000 Units total) by mouth once daily., Disp: 30 tablet, Rfl: 11    fish oil-omega-3 fatty acids 300-1,000 mg capsule, Take 2 g by mouth once daily., Disp: , Rfl:     ibuprofen (ADVIL,MOTRIN) 600 MG tablet, Take 1 tablet (600 mg total) by mouth every 6 (six) hours as needed for Pain., Disp: 20 tablet, Rfl: 0  Does patient have record of home blood pressure readings no. Readings have been averaging n/a.   Last dose of blood pressure medication was taken at 2/6/2023 morning.  Patient is asymptomatic.   Complains of n/a.    BP: (!) 152/86 , Pulse: 74 .    Blood pressure reading after 15 minutes was 144/88, Pulse 86.  Dr. Sevilla notified.

## 2023-02-08 DIAGNOSIS — I10 ESSENTIAL (PRIMARY) HYPERTENSION: ICD-10-CM

## 2023-02-08 NOTE — TELEPHONE ENCOUNTER
No new care gaps identified.  [facility] Embedded Care Gaps. Reference number: [id number]. 2/08/2023   2:56:16 PM CST

## 2023-02-08 NOTE — TELEPHONE ENCOUNTER
----- Message from Natalya Spivey sent at 2/8/2023  2:28 PM CST -----  Regarding: Return Call    Who Called:  Mrs. Larson      Who Left Message for Patient:  Juliaalva Goncalves      Does the patient know what this is regarding?  No        Best Call Back Number: [phone]         Additional Information: Patient is returning a call.  Please assist.  Patient with a call back.

## 2023-02-08 NOTE — TELEPHONE ENCOUNTER
----- Message from Marcos Sevilla MD sent at 2/6/2023 10:46 AM CST -----  Blood pressure with consistent elevation noted at nurse visit.  Please call patient with following steps:  1) Continue current amlodipine 5 mg daily.  2) New prescription for ENALAPRIL has been sent to pharmacy to be taken as 10 mg daily in addition to amlodipine.  3) She is advised to maintain low salt intake diet.  She is to monitor BP at home once daily with goal  BP <130/80.    Please schedule her for nurse BP check to be done 2 weeks after starting new medicine.    ----- Message -----  From: Sacha Espitia RN  Sent: 2/6/2023  10:19 AM CST  To: Marcos Sevilla MD

## 2023-02-09 RX ORDER — BENAZEPRIL HYDROCHLORIDE 10 MG/1
10 TABLET ORAL DAILY
Qty: 90 TABLET | Refills: 1 | Status: SHIPPED | OUTPATIENT
Start: 2023-02-09 | End: 2023-03-06

## 2023-03-02 ENCOUNTER — OFFICE VISIT (OUTPATIENT)
Dept: ENDOCRINOLOGY | Facility: CLINIC | Age: 78
End: 2023-03-02
Payer: MEDICARE

## 2023-03-02 ENCOUNTER — CLINICAL SUPPORT (OUTPATIENT)
Dept: FAMILY MEDICINE | Facility: CLINIC | Age: 78
End: 2023-03-02
Payer: MEDICARE

## 2023-03-02 VITALS
WEIGHT: 180.63 LBS | SYSTOLIC BLOOD PRESSURE: 179 MMHG | BODY MASS INDEX: 30.05 KG/M2 | DIASTOLIC BLOOD PRESSURE: 110 MMHG | HEART RATE: 81 BPM | HEART RATE: 84 BPM | SYSTOLIC BLOOD PRESSURE: 142 MMHG | DIASTOLIC BLOOD PRESSURE: 92 MMHG | TEMPERATURE: 98 F

## 2023-03-02 DIAGNOSIS — M85.80 OSTEOPENIA, UNSPECIFIED LOCATION: Primary | ICD-10-CM

## 2023-03-02 DIAGNOSIS — E21.3 HYPERPARATHYROIDISM, UNSPECIFIED: ICD-10-CM

## 2023-03-02 DIAGNOSIS — I10 ESSENTIAL (PRIMARY) HYPERTENSION: Primary | ICD-10-CM

## 2023-03-02 DIAGNOSIS — E21.0 HYPERPARATHYROIDISM, PRIMARY: ICD-10-CM

## 2023-03-02 PROCEDURE — 99214 PR OFFICE/OUTPT VISIT, EST, LEVL IV, 30-39 MIN: ICD-10-PCS | Mod: HCNC,S$GLB,, | Performed by: HOSPITALIST

## 2023-03-02 PROCEDURE — 1126F AMNT PAIN NOTED NONE PRSNT: CPT | Mod: HCNC,CPTII,S$GLB, | Performed by: HOSPITALIST

## 2023-03-02 PROCEDURE — 3288F FALL RISK ASSESSMENT DOCD: CPT | Mod: HCNC,CPTII,S$GLB, | Performed by: HOSPITALIST

## 2023-03-02 PROCEDURE — 99999 PR PBB SHADOW E&M-EST. PATIENT-LVL IV: ICD-10-PCS | Mod: PBBFAC,HCNC,, | Performed by: HOSPITALIST

## 2023-03-02 PROCEDURE — 1101F PT FALLS ASSESS-DOCD LE1/YR: CPT | Mod: HCNC,CPTII,S$GLB, | Performed by: HOSPITALIST

## 2023-03-02 PROCEDURE — 99999 PR PBB SHADOW E&M-EST. PATIENT-LVL II: CPT | Mod: PBBFAC,HCNC,,

## 2023-03-02 PROCEDURE — 3080F PR MOST RECENT DIASTOLIC BLOOD PRESSURE >= 90 MM HG: ICD-10-PCS | Mod: HCNC,CPTII,S$GLB, | Performed by: HOSPITALIST

## 2023-03-02 PROCEDURE — 99999 PR PBB SHADOW E&M-EST. PATIENT-LVL IV: CPT | Mod: PBBFAC,HCNC,, | Performed by: HOSPITALIST

## 2023-03-02 PROCEDURE — 1101F PR PT FALLS ASSESS DOC 0-1 FALLS W/OUT INJ PAST YR: ICD-10-PCS | Mod: HCNC,CPTII,S$GLB, | Performed by: HOSPITALIST

## 2023-03-02 PROCEDURE — 99499 NO LOS: ICD-10-PCS | Mod: HCNC,S$GLB,, | Performed by: INTERNAL MEDICINE

## 2023-03-02 PROCEDURE — 99214 OFFICE O/P EST MOD 30 MIN: CPT | Mod: HCNC,S$GLB,, | Performed by: HOSPITALIST

## 2023-03-02 PROCEDURE — 99499 UNLISTED E&M SERVICE: CPT | Mod: HCNC,S$GLB,, | Performed by: INTERNAL MEDICINE

## 2023-03-02 PROCEDURE — 99999 PR PBB SHADOW E&M-EST. PATIENT-LVL II: ICD-10-PCS | Mod: PBBFAC,HCNC,,

## 2023-03-02 PROCEDURE — 3080F DIAST BP >= 90 MM HG: CPT | Mod: HCNC,CPTII,S$GLB, | Performed by: HOSPITALIST

## 2023-03-02 PROCEDURE — 3077F SYST BP >= 140 MM HG: CPT | Mod: HCNC,CPTII,S$GLB, | Performed by: HOSPITALIST

## 2023-03-02 PROCEDURE — 3288F PR FALLS RISK ASSESSMENT DOCUMENTED: ICD-10-PCS | Mod: HCNC,CPTII,S$GLB, | Performed by: HOSPITALIST

## 2023-03-02 PROCEDURE — 1159F MED LIST DOCD IN RCRD: CPT | Mod: HCNC,CPTII,S$GLB, | Performed by: HOSPITALIST

## 2023-03-02 PROCEDURE — 1126F PR PAIN SEVERITY QUANTIFIED, NO PAIN PRESENT: ICD-10-PCS | Mod: HCNC,CPTII,S$GLB, | Performed by: HOSPITALIST

## 2023-03-02 PROCEDURE — 1160F RVW MEDS BY RX/DR IN RCRD: CPT | Mod: HCNC,CPTII,S$GLB, | Performed by: HOSPITALIST

## 2023-03-02 PROCEDURE — 1159F PR MEDICATION LIST DOCUMENTED IN MEDICAL RECORD: ICD-10-PCS | Mod: HCNC,CPTII,S$GLB, | Performed by: HOSPITALIST

## 2023-03-02 PROCEDURE — 3077F PR MOST RECENT SYSTOLIC BLOOD PRESSURE >= 140 MM HG: ICD-10-PCS | Mod: HCNC,CPTII,S$GLB, | Performed by: HOSPITALIST

## 2023-03-02 PROCEDURE — 1160F PR REVIEW ALL MEDS BY PRESCRIBER/CLIN PHARMACIST DOCUMENTED: ICD-10-PCS | Mod: HCNC,CPTII,S$GLB, | Performed by: HOSPITALIST

## 2023-03-02 NOTE — PROGRESS NOTES
Subjective:      Patient ID: Marsha Chun is a 78 y.o. female presented to Endocrinology clinic on 3/2/2023.    Chief Complaint:  Osteopenia    History of Present Illness: Marsha Chun is a 78 y.o. female with history of primary hyperparathyroidism.  Other significant past by for history including: hypertension, obesity    Interval history:  No acute issue since last visit.  Doing well at this time.    We have been monitoring her hyperparathyroidism, osteopenia, renal function: stable.  Vit D supplement:  5000 IU daily  No renal stone   No fall, No broken broke      1) In regard to Hyperparathyroidism  - Patient was previously by Dr. Jaquez on 2/2019, previously seen by Dr. Flynn in 2013.  Patient is following up with me at this time.  - suspect primary Hyperparathyroidism  - She was first noted to have hypercalcemia in 2009 - this was the only episode noted   - Her PTH was first checked at that time and was elevated. She was on hydrochlorothiazide at that time which was stopped and since there has been normalization of the calcium but the pth has remained elevated   - Sestamibi 2011  No findings to suggest parathyroid adenoma.                                                                             - U/s 2011 IMPRESSION:  No significant abnormalities are identified.                             - CT abd: 5/2022: There is right nonobstructive nephrolithiasis.>> seen urologist, no major intervention                                                 Patient fell 2 months ago leading to a left knee injury, doing physical therapy at this time  Uses cane to assist with ambulation  Vit D supplement:  5000 IU daily  Not on any calcium supplements, Tums    No   Yes  [x]    []  HCTZ   [x]    []  Lithium  [x]    []  Chlorthalidone>> off medication for the last 3 month    Past medical history significant for:  - Chronic kidney disease:yes, CKD  - Kidney transplant: no  - Vitamin-D deficiency:  No  -  Osteoporosis: no, does have osteopenia  - Renal stones: yes  - History of cancer/with metastasis: no  - Prior radiation treatment, or unexplained elevations of alk phos on labs: no  - Hyperthyroidism/Graves: no    Family history of renal stones/hypercalcemia: no  Tobacco use, including use in the past: no     Lab Results   Component Value Date    .1 (H) 01/23/2023    PTH 97.5 (H) 06/17/2022    PTH 82.0 (H) 09/29/2021    .0 (H) 02/22/2021    CALCIUM 10.0 01/13/2023    CALCIUM 10.1 06/17/2022    CALCIUM 10.2 01/14/2022    CALCIUM 10.3 09/29/2021    CALCIUM 9.7 02/22/2021    CAION 1.29 01/20/2020    CAION 1.23 01/21/2011     Lab Results   Component Value Date    CBBDDSJF41DN 41 01/13/2023    CPTQDPDQ75PH 31 06/17/2022    TBCGQGZX61XK 35 09/29/2021    ALKPHOS 138 (H) 01/13/2023    ALKPHOS 154 (H) 06/17/2022    PHOS 3.3 02/22/2021    TSH 4.007 (H) 01/13/2023     24Hour Urine  Lab Results   Component Value Date    TGPKKZZ40PQK 6 02/22/2021    CAURMGSPEC 135 02/22/2021    CRTURNMGSPEC 1066.0 02/22/2021   Urine Calcium/Creatinine Clearance Ratio = 0.010      2) Osteopenia  - Postmenopausal symptoms age 52  - Never had hysterectomy  - No family history of osteoporosis that she knows    2/21/21  Lumbar spine (L1-L4):  BMD is 0.841 g/cm2, T-score is -1.9, and Z-score is -0.2.  Total hip:  BMD is 0.851 g/cm2, T-score is -0.7, and Z-score is 0.1.  Femoral neck: BMD is 0.742 g/cm2, T-score is -1.0, and Z-score is 0.1.     Comparison  Lumbar spine:  BMD 0.866 g/cm2 and T-score -1.6.  Total Hip:        BMD 0.888 g/cm2 and T-score -0.4.    FRAX: 4.0% risk of a major osteoporotic fracture in the next 10 years.  0.5% risk of hip fracture in the next 10 years.     Impression:  1. Osteopenia; FRAX calculations do not support treatment as osteoporosis.  2. Compared with previous DXA, BMD at the lumbar spine has declined by 2.9%, and the BMD at the total hip has declined by 4.1%.      02/21/2019  Lumbar spine (L1-L4): BMD is  0.841 g/cm2, T-score is -1.9, and Z-score is -0.2.  Total hip: BMD is 0.851 g/cm2, T-score is -0.7, and Z-score is 0.1.  Femoral neck:BMD is 0.742 g/cm2, T-score is -1.0, and Z-score is 0.1.    Comparison  Lumbar spine: BMD 0.866 g/cm2 and T-score -1.6.  Total Hip: BMD 0.888 g/cm2 and T-score -0.4.    FRAX: 4.0% risk of a major osteoporotic fracture in the next 10 years. 0.5% risk of hip fracture in the next 10 years.     Impression: Osteopenia; FRAX calculations do not support treatment as osteoporosis.  Compared with previous DXA, BMD at the lumbar spine has declined by 2.9%, and the BMD at the total hip has declined by 4.1%.r spine.      3) Abnormal TSH  - TSH elevation noted on recent blood work.  No history of hyperthyroidism  - Sister with thyroid medication  - incidental finding noted on lab work 1/22, not on therapy at this time    Reviewed past surgical, medical, family, social history and updated as appropriate.  Review of Systems : See above     Objective:   BP (!) 179/110 (BP Location: Left arm)   Pulse 81   Temp 98.1 °F (36.7 °C) (Oral)   Wt 81.9 kg (180 lb 9.6 oz)   BMI 30.05 kg/m²     Body mass index is 30.05 kg/m².    Physical Exam  Vitals and nursing note reviewed.   Constitutional:       Appearance: She is well-developed.   HENT:      Head: Normocephalic.   Eyes:      General: No scleral icterus.     Conjunctiva/sclera: Conjunctivae normal.   Cardiovascular:      Rate and Rhythm: Normal rate.      Heart sounds: Normal heart sounds.   Pulmonary:      Effort: Pulmonary effort is normal. No respiratory distress.   Abdominal:      Palpations: Abdomen is soft.      Tenderness: There is no abdominal tenderness.   Musculoskeletal:         General: Normal range of motion.      Cervical back: Neck supple.   Skin:     General: Skin is warm.      Findings: No erythema.   Neurological:      Mental Status: She is alert.      Coordination: Coordination normal.   Psychiatric:         Behavior: Behavior  normal.     Lab Review:   Lab Results   Component Value Date    HGBA1C 5.1 01/13/2023     Lab Results   Component Value Date    CHOL 235 (H) 01/13/2023    HDL 82 (H) 01/13/2023    LDLCALC 136.8 01/13/2023    TRIG 81 01/13/2023    CHOLHDL 34.9 01/13/2023     Lab Results   Component Value Date     01/13/2023    K 3.9 01/13/2023     01/13/2023    CO2 28 01/13/2023    GLU 85 01/13/2023    BUN 11 01/13/2023    CREATININE 0.8 01/13/2023    CALCIUM 10.0 01/13/2023    PROT 7.7 01/13/2023    ALBUMIN 3.9 01/13/2023    BILITOT 0.5 01/13/2023    ALKPHOS 138 (H) 01/13/2023    AST 17 01/13/2023    ALT 8 (L) 01/13/2023    ANIONGAP 4 (L) 01/13/2023    ESTGFRAFRICA 51.1 (A) 06/17/2022    EGFRNONAA 44.3 (A) 06/17/2022    TSH 4.007 (H) 01/13/2023     Assessment     1. Osteopenia, unspecified location  DXA Bone Density Axial Skeleton 1 or more sites      2. Hyperparathyroidism, unspecified  DXA Bone Density Axial Skeleton 1 or more sites      3. Hyperparathyroidism, primary          Plan     Hyperparathyroidism, primary  - Patient with primary hyperparathyroidism, with remote history of hypercalcemia, per chart review resolve with stopping of HCTZ   - Previously worked up in 2011 with negative NM scan  - Calcium lab work recently have all been normal  - Dietary reviewed: not contributing  - DXA with osteopenia most recently 2/2021  - Secondary workup stable, stable TSH, normal vitamin-D  - Urine Ca/Cr ratio: 0.010  - recent diagnosis of kidney stone right 5/22     Plan  - patient does not desire aggressive surgical option for parathyroidectomy.  We will favor routine lab work screening every 6 months  - monitor vitamin-D, calcium, PTH  - previously had normal serum calcium, stable renal function, stable bone density, PTH level decreasing,    - Advised to avoid dehydration, excessive calcium supplementations and avoid medication like HCTZ/Chlorthalidone that can worsen hypercalcemia.  - Follow up with lab work and 6 months,  follow up at that time    Osteopenia  - DXA in 2019 reviewed, no indication for treatment at that time  - Done on 2/2021: Bone density shows osteopenia, FRAX is not indicate treatment  - we will check bone density for 2023  - Continue vitamin-D supplementation  - Advises weight-bearing exercise, walking    Abnormal TSH  - noted on lab work , mild fluctuation of TSH  - positive family history with sister  - age-appropriate TSH goal.  Would not start LT4 unless increase above TSH >10      RTC in 6 months with lab work to continue monitor    Boris Robbins MD  Endocrinology- [facility]  3/2/2023      Disclaimer: This note has been generated using voice-recognition software. There may be typographical errors that have been missed during proof-reading.

## 2023-03-02 NOTE — Clinical Note
Just FYI Dr. Sevilla, she stated she just started the benazepril yesterday 3/1/23. I instructed that she has a log of her bp for the next couple weeks and to call us back when she has updated readings

## 2023-03-02 NOTE — ASSESSMENT & PLAN NOTE
- Patient with primary hyperparathyroidism, with remote history of hypercalcemia, per chart review resolve with stopping of HCTZ   - Previously worked up in 2011 with negative NM scan  - Calcium lab work recently have all been normal  - Dietary reviewed: not contributing  - DXA with osteopenia most recently 2/2021  - Secondary workup stable, stable TSH, normal vitamin-D  - Urine Ca/Cr ratio: 0.010  - recent diagnosis of kidney stone right 5/22     Plan  - patient does not desire aggressive surgical option for parathyroidectomy.  We will favor routine lab work screening every 6 months  - monitor vitamin-D, calcium, PTH  - previously had normal serum calcium, stable renal function, stable bone density, PTH level decreasing,    - Advised to avoid dehydration, excessive calcium supplementations and avoid medication like HCTZ/Chlorthalidone that can worsen hypercalcemia.  - Follow up with lab work and 6 months, follow up at that time

## 2023-03-02 NOTE — ASSESSMENT & PLAN NOTE
- DXA in 2019 reviewed, no indication for treatment at that time  - Done on 2/2021: Bone density shows osteopenia, FRAX is not indicate treatment  - we will check bone density for 2023  - Continue vitamin-D supplementation  - Advises weight-bearing exercise, walking

## 2023-03-02 NOTE — PROGRESS NOTES
Marsha Chun 78 y.o. female is here today for Blood Pressure check.   History of HTN yes.    Review of patient's allergies indicates:  No Known Allergies  Creatinine   Date Value Ref Range Status   01/13/2023 0.8 0.5 - 1.4 mg/dL Final     Sodium   Date Value Ref Range Status   01/13/2023 137 136 - 145 mmol/L Final     Potassium   Date Value Ref Range Status   01/13/2023 3.9 3.5 - 5.1 mmol/L Final   ]  Patient verifies taking blood pressure medications on a regular basis at the same time of the day.     Current Outpatient Medications:     acetaminophen (TYLENOL) 500 MG tablet, Take 2 tablets (1,000 mg total) by mouth 2 (two) times daily as needed for Pain. (Patient not taking: Reported on 1/23/2023), Disp: 50 tablet, Rfl: 2    amLODIPine (NORVASC) 5 MG tablet, Take 1 tablet (5 mg total) by mouth once daily., Disp: 90 tablet, Rfl: 3    benazepriL (LOTENSIN) 10 MG tablet, Take 1 tablet (10 mg total) by mouth once daily., Disp: 90 tablet, Rfl: 1    chlorhexidine (PERIDEX) 0.12 % solution, , Disp: , Rfl:     cholecalciferol, vitamin D3, (VITAMIN D3) 125 mcg (5,000 unit) Tab, Take 1 tablet (5,000 Units total) by mouth once daily., Disp: 30 tablet, Rfl: 11    fish oil-omega-3 fatty acids 300-1,000 mg capsule, Take 2 g by mouth once daily., Disp: , Rfl:     ibuprofen (ADVIL,MOTRIN) 600 MG tablet, Take 1 tablet (600 mg total) by mouth every 6 (six) hours as needed for Pain., Disp: 20 tablet, Rfl: 0  Does patient have record of home blood pressure readings no. Readings have been averaging n/a.   Last dose of blood pressure medication was taken at 3/2/2023 morning.  Patient is asymptomatic.   Complains of n/a.  Patient reports that she just started taking the Benazepril yesterday 3/1/23.  BP: (!) 148/94 , Pulse: 91 .    Blood pressure reading after 15 minutes was 142/92, Pulse 84.  Dr. Sevilla notified.

## 2023-03-06 ENCOUNTER — TELEPHONE (OUTPATIENT)
Dept: FAMILY MEDICINE | Facility: CLINIC | Age: 78
End: 2023-03-06
Payer: MEDICARE

## 2023-03-06 DIAGNOSIS — I10 ESSENTIAL (PRIMARY) HYPERTENSION: Primary | ICD-10-CM

## 2023-03-06 DIAGNOSIS — E78.2 MIXED HYPERLIPIDEMIA: ICD-10-CM

## 2023-03-06 RX ORDER — BENAZEPRIL HYDROCHLORIDE 20 MG/1
20 TABLET ORAL DAILY
Qty: 90 TABLET | Refills: 3 | Status: SHIPPED | OUTPATIENT
Start: 2023-03-06 | End: 2024-03-05

## 2023-03-06 NOTE — TELEPHONE ENCOUNTER
----- Message from Marcos Sevilla MD sent at 3/6/2023  1:06 PM CST -----  Please call patient to inform:    Your recent blood pressure readings in clinic are elevated.  This can increase risk of stroke, heart attack and other complications.  We need to increase blood pressure medication regimen.  You make continue amlodipine as 5 mg daily.  Your BENAZEPRIL is increased to 20 mg daily.  If you have any of the benazepril 10 mg pills at home, you may take two of these daily.  Your next refill with be for benazepril 20 mg to be taken as one pill daily.  Please keep a log of your blood pressure readings at home.  You are being referred to digital hypertension monitoring as well.    Schedule nurse BP check in 2 weeks on new regimen.

## 2023-03-06 NOTE — TELEPHONE ENCOUNTER
Left message for the pt to give us a call back regarding BP readings medication and BP recheck needed in 2 weeks.

## 2023-05-17 ENCOUNTER — PATIENT OUTREACH (OUTPATIENT)
Dept: ADMINISTRATIVE | Facility: HOSPITAL | Age: 78
End: 2023-05-17
Payer: MEDICARE

## 2023-05-17 NOTE — PROGRESS NOTES
LVM for pt to call back and schedule a nurse BP visit, visit with PCP or give a home BP reading. Office visit scheduled for 07/25/23.Immunization's updated.

## 2023-08-09 NOTE — LETTER
March 28, 2018        Anali Quigley, NP-C  [address]  [place] [address]             [address], [address]  [facility] [address]  Phone: [phone]   Patient: Marsha Chun   MR Number: [id number]   YOB: 1945   Date of Visit: 3/28/2018       Dear Dr. Quigley:    Thank you for referring Marsha Chun to me for evaluation. Below are the relevant portions of my assessment and plan of care.            If you have questions, please do not hesitate to call me. I look forward to following Marsha along with you.    Sincerely,      Tadeo Ojeda Jr., MD           CC  No Recipients        Unlikely ACS, EKG NSR, trop (-)  Endorses mid epigastric burning pain  Also MSK in nature as pt has been using her upper body to compensate for LE weakness  Improved with rest since admission

## 2023-08-14 ENCOUNTER — TELEPHONE (OUTPATIENT)
Dept: ENDOCRINOLOGY | Facility: CLINIC | Age: 78
End: 2023-08-14
Payer: MEDICARE

## 2023-08-14 DIAGNOSIS — E21.3 HYPERPARATHYROIDISM, UNSPECIFIED: Primary | ICD-10-CM

## 2023-08-25 ENCOUNTER — LAB VISIT (OUTPATIENT)
Dept: LAB | Facility: HOSPITAL | Age: 78
End: 2023-08-25
Attending: HOSPITALIST
Payer: MEDICARE

## 2023-08-25 DIAGNOSIS — E21.3 HYPERPARATHYROIDISM, UNSPECIFIED: ICD-10-CM

## 2023-08-25 LAB
25(OH)D3+25(OH)D2 SERPL-MCNC: 38 NG/ML (ref 30–96)
ALBUMIN SERPL BCP-MCNC: 3.8 G/DL (ref 3.5–5.2)
ALP SERPL-CCNC: 124 U/L (ref 55–135)
ALT SERPL W/O P-5'-P-CCNC: 11 U/L (ref 10–44)
ANION GAP SERPL CALC-SCNC: 7 MMOL/L (ref 8–16)
AST SERPL-CCNC: 19 U/L (ref 10–40)
BILIRUB SERPL-MCNC: 0.6 MG/DL (ref 0.1–1)
BUN SERPL-MCNC: 13 MG/DL (ref 8–23)
CALCIUM SERPL-MCNC: 9.7 MG/DL (ref 8.7–10.5)
CHLORIDE SERPL-SCNC: 105 MMOL/L (ref 95–110)
CO2 SERPL-SCNC: 24 MMOL/L (ref 23–29)
CREAT SERPL-MCNC: 0.8 MG/DL (ref 0.5–1.4)
EST. GFR  (NO RACE VARIABLE): >60 ML/MIN/1.73 M^2
GLUCOSE SERPL-MCNC: 93 MG/DL (ref 70–110)
POTASSIUM SERPL-SCNC: 4.5 MMOL/L (ref 3.5–5.1)
PROT SERPL-MCNC: 7.3 G/DL (ref 6–8.4)
PTH-INTACT SERPL-MCNC: 101.9 PG/ML (ref 9–77)
SODIUM SERPL-SCNC: 136 MMOL/L (ref 136–145)

## 2023-08-25 PROCEDURE — 80053 COMPREHEN METABOLIC PANEL: CPT | Performed by: HOSPITALIST

## 2023-08-25 PROCEDURE — 36415 COLL VENOUS BLD VENIPUNCTURE: CPT | Performed by: HOSPITALIST

## 2023-08-25 PROCEDURE — 82306 VITAMIN D 25 HYDROXY: CPT | Performed by: HOSPITALIST

## 2023-08-25 PROCEDURE — 83970 ASSAY OF PARATHORMONE: CPT | Performed by: HOSPITALIST

## 2023-09-15 ENCOUNTER — PATIENT OUTREACH (OUTPATIENT)
Dept: ADMINISTRATIVE | Facility: HOSPITAL | Age: 78
End: 2023-09-15
Payer: MEDICARE

## 2023-09-15 NOTE — PROGRESS NOTES
Pt stated that Dr. Sevilla is no longer her PCP. Pt is now being followed by Maame.Gap report updated.Immunization's updated/triggered.

## 2023-10-26 ENCOUNTER — CLINICAL SUPPORT (OUTPATIENT)
Dept: OTHER | Facility: CLINIC | Age: 78
End: 2023-10-26

## 2023-10-26 DIAGNOSIS — Z00.8 ENCOUNTER FOR OTHER GENERAL EXAMINATION: ICD-10-CM

## 2023-10-27 VITALS
BODY MASS INDEX: 28.51 KG/M2 | DIASTOLIC BLOOD PRESSURE: 92 MMHG | HEIGHT: 64 IN | WEIGHT: 167 LBS | SYSTOLIC BLOOD PRESSURE: 202 MMHG

## 2023-10-27 LAB
GLUCOSE SERPL-MCNC: 108 MG/DL (ref 60–140)
HDLC SERPL-MCNC: 68 MG/DL
POC CHOLESTEROL, LDL (DOCK): 117 MG/DL
POC CHOLESTEROL, TOTAL: 200 MG/DL
TRIGL SERPL-MCNC: 82 MG/DL

## 2023-11-30 ENCOUNTER — TELEPHONE (OUTPATIENT)
Dept: OTHER | Facility: CLINIC | Age: 78
End: 2023-11-30
Payer: MEDICARE

## 2023-11-30 NOTE — PROGRESS NOTES
BP med taken this morning. Will recheck at counseling PM    did not take Bp med yesterday; emphasized compliance. BP usually must be taken twice at the doctors office. BP Monday was 147/77. does not monitor regularly at home. Revisit MD in two weeks. BP recheck 172/82(edilma silva)Advised to call MD this afternoon, report results and request further guidance. Instructed to call 911 for medical emergency. EDILMA Huntley

## 2025-05-14 ENCOUNTER — TELEPHONE (OUTPATIENT)
Dept: FAMILY MEDICINE | Facility: CLINIC | Age: 80
End: 2025-05-14
Payer: MEDICARE

## 2025-05-14 NOTE — TELEPHONE ENCOUNTER
I left a voice mail for the daughter, Genesis, that the patient can not be seen at [facility]. The patient has [facility] insurance and [place] [employer] is [employer].

## 2025-05-14 NOTE — TELEPHONE ENCOUNTER
----- Message from Fátima sent at 5/12/2025 10:04 AM CDT -----  Regarding: daughter  Who called: daughterWhat is the request in detail: pt would like to be est with provider for new pcpCan the clinic reply by [facility]? NoWould the patient rather a call back or a response via [facility]? Call [facility] call back number: [phone] DionneAdditional Information:Thank you.   Pt will cont to follow with neuro   Now that she is done nursing will plan to restart tecfedara for MS